# Patient Record
Sex: FEMALE | Race: WHITE | NOT HISPANIC OR LATINO | Employment: FULL TIME | URBAN - METROPOLITAN AREA
[De-identification: names, ages, dates, MRNs, and addresses within clinical notes are randomized per-mention and may not be internally consistent; named-entity substitution may affect disease eponyms.]

---

## 2018-05-04 ENCOUNTER — OFFICE VISIT (OUTPATIENT)
Dept: FAMILY MEDICINE | Facility: CLINIC | Age: 33
End: 2018-05-04
Payer: COMMERCIAL

## 2018-05-04 VITALS
HEIGHT: 64 IN | BODY MASS INDEX: 36.02 KG/M2 | TEMPERATURE: 98.3 F | OXYGEN SATURATION: 96 % | DIASTOLIC BLOOD PRESSURE: 84 MMHG | SYSTOLIC BLOOD PRESSURE: 126 MMHG | WEIGHT: 211 LBS | HEART RATE: 61 BPM

## 2018-05-04 DIAGNOSIS — R22.41 MASS OF RIGHT THIGH: Primary | ICD-10-CM

## 2018-05-04 PROBLEM — K50.80 CROHN'S DISEASE OF BOTH SMALL AND LARGE INTESTINE WITHOUT COMPLICATION (H): Status: ACTIVE | Noted: 2018-05-04

## 2018-05-04 PROCEDURE — 99213 OFFICE O/P EST LOW 20 MIN: CPT | Performed by: NURSE PRACTITIONER

## 2018-05-04 ASSESSMENT — PAIN SCALES - GENERAL: PAINLEVEL: NO PAIN (0)

## 2018-05-04 NOTE — PROGRESS NOTES
SUBJECTIVE:   Marylou Mortensen is a 32 year old female who presents to clinic today for the following health issues:      Concern - Thigh swelling  Onset: noticed 1 week ago    Description:   Swelling or size increased in Right thigh     Intensity: mild    Progression of Symptoms:  same    Accompanying Signs & Symptoms:  Numbness in thigh an right leg occasionally    Previous history of similar problem:   No    Precipitating factors:   Worsened by: None    Alleviating factors:  Improved by: None    Therapies Tried and outcome: None    32 year old female presents with the above complaints. She was trying on pants about 1 week ago and noticed that her right thigh is larger than her left and she has a firm bulge/mass to the medial aspect of the right thigh. No pain. Flexing her thigh doesn't change the mass. Leg feels a little numb when she sits flat on her buttocks. Mom has a hx of benign tumors all over body (mouth, uterus)    Problem list and histories reviewed & adjusted, as indicated.  Additional history: as documented    Patient Active Problem List   Diagnosis     Crohn's disease of both small and large intestine without complication (H)     Past Surgical History:   Procedure Laterality Date     NO HISTORY OF SURGERY         Social History   Substance Use Topics     Smoking status: Never Smoker     Smokeless tobacco: Never Used     Alcohol use Yes      Comment: socially     Family History   Problem Relation Age of Onset     DIABETES Father      C.A.D. Maternal Grandmother      C.A.D. Paternal Grandmother      Breast Cancer Paternal Grandmother          Current Outpatient Prescriptions   Medication Sig Dispense Refill     AZATHIOPRINE PO        InFLIXimab (REMICADE IV)        methylPREDNISolone sodium succinate        VITAMIN D, CHOLECALCIFEROL, PO Take 2,000 Units by mouth daily       ATROPINE SULFATE 1 % OP SOLN One gtt qid 1 bottle 0     CYCLOPENTOLATE HCL 1 % OP SOLN   0     PREDNISOL 1 % OP SOLN as  "directed 1 0     No Known Allergies    Reviewed and updated as needed this visit by clinical staff  Tobacco  Allergies  Meds  Problems  Med Hx  Surg Hx  Fam Hx  Soc Hx        Reviewed and updated as needed this visit by Provider  Allergies  Meds  Problems         ROS:  Constitutional, HEENT, cardiovascular, pulmonary, gi and gu systems are negative, except as otherwise noted.    OBJECTIVE:     /84 (BP Location: Right arm, Patient Position: Chair, Cuff Size: Adult Large)  Pulse 61  Temp 98.3  F (36.8  C) (Oral)  Ht 5' 3.75\" (1.619 m)  Wt 211 lb (95.7 kg)  SpO2 96%  Breastfeeding? No  BMI 36.5 kg/m2  Body mass index is 36.5 kg/(m^2).  GENERAL: healthy, alert and no distress  RESP: lungs clear to auscultation - no rales, rhonchi or wheezes  CV: regular rate and rhythm, normal S1 S2, no S3 or S4, no murmur, click or rub, no peripheral edema and peripheral pulses strong  MS: obvious asymmetry of thighs. Left thigh measures 24.5 in and right thigh 29 in. No tenderness  SKIN: no suspicious lesions or rashes. No erythema or ecchymosis  PSYCH: mentation appears normal, affect normal/bright    Diagnostic Test Results:  none     ASSESSMENT/PLAN:     1. Mass of right thigh  Will get US. Further plan pending results of imaging  - US Extremity Non Vascular Right; Future    See Patient Instructions    Patient verbalizes understanding and agrees with plan of care. Patient stable for discharge.      HARESH Torres [facility]  "

## 2018-05-04 NOTE — PATIENT INSTRUCTIONS
Please schedule ultrasound. Further plan pending results    At [facility], we strive to deliver an exceptional experience to you, every time we see you.  If you receive a survey in the mail, please send us back your thoughts. We really do value your feedback.    Based on your medical history, these are the current health maintenance/preventive care services that you are due for (some may have been done at this visit.)  Health Maintenance Due   Topic Date Due     TETANUS IMMUNIZATION (SYSTEM ASSIGNED)  07/15/2002     HIV SCREEN (SYSTEM ASSIGNED)  07/15/2002     PAP SCREENING Q3 YR (SYSTEM ASSIGNED)  07/15/2005         Suggested websites for health information:  Www.[facility].org : Up to date and easily searchable information on multiple topics.  Www.medlineplus.gov : medication info, interactive tutorials, watch real surgeries online  Www.familydoctor.org : good info from the Academy of Family Physicians  Www.cdc.gov : public health info, travel advisories, epidemics (H1N1)  Www.aap.org : children's health info, normal development, vaccinations  Www.health.[facility].[place].us : [place] dept of health, public health issues in [place], N1N1    Your care team:                            Family Medicine Internal Medicine   MD Riccardo Jackson MD Shantel Branch-Fleming, MD Katya Georgiev PA-C Nam Ho, MD Pediatrics   MARIVEL Garnett, MD Cheryl Lovell CNP, MD Deborah Mielke, MD Kim Thein, APRN [facility]      Clinic hours: Monday - Thursday 7 am-7 pm; Fridays 7 am-5 pm.   Urgent care: Monday - Friday 11 am-9 pm; Saturday and Sunday 9 am-5 pm.  Pharmacy : Monday -Thursday 8 am-8 pm; Friday 8 am-6 pm; Saturday and Sunday 9 am-5 pm.     Clinic: [phone]   Pharmacy: [phone]

## 2018-05-04 NOTE — MR AVS SNAPSHOT
After Visit Summary   5/4/2018    Marylou Mortensen    MRN: [id number]           Patient Information     Date Of Birth          1985        Visit Information        Provider Department      5/4/2018 11:40 AM Chen Valiente APRN CNP [facility]        Today's Diagnoses     Mass of right thigh    -  1      Care Instructions    Please schedule ultrasound. Further plan pending results    At [facility], we strive to deliver an exceptional experience to you, every time we see you.  If you receive a survey in the mail, please send us back your thoughts. We really do value your feedback.    Based on your medical history, these are the current health maintenance/preventive care services that you are due for (some may have been done at this visit.)  Health Maintenance Due   Topic Date Due     TETANUS IMMUNIZATION (SYSTEM ASSIGNED)  07/15/2002     HIV SCREEN (SYSTEM ASSIGNED)  07/15/2002     PAP SCREENING Q3 YR (SYSTEM ASSIGNED)  07/15/2005         Suggested websites for health information:  Www.[employer] : Up to date and easily searchable information on multiple topics.  Www.medlineplus.gov : medication info, interactive tutorials, watch real surgeries online  Www.familydoctor.org : good info from the Academy of Family Physicians  Www.cdc.gov : public health info, travel advisories, epidemics (H1N1)  Www.aap.org : children's health info, normal development, vaccinations  Www.health.state.[place]us : [place] dept of health, public health issues in [place], N1N1    Your care team:                            Family Medicine Internal Medicine   MD Riccardo Jackson MD Shantel Branch-Fleming, MD Katya Georgiev PA-C Nam Ho, MD Pediatrics   MARIVEL Garnett CNP Amelia Massimini APRN CNP Shaista Malik, MD Bethany Templen, MD Deborah Mielke, MD Kim Thein, APRN CNP      Clinic hours: Monday - Thursday 7 am-7 pm; Fridays 7 am-5 pm.  "  Urgent care: Monday - Friday 11 am-9 pm; Saturday and  9 am-5 pm.  Pharmacy : Monday -Thursday 8 am-8 pm; Friday 8 am-6 pm; Saturday and  9 am-5 pm.     Clinic: [phone]   Pharmacy: [phone]            Follow-ups after your visit        Future tests that were ordered for you today     Open Future Orders        Priority Expected Expires Ordered    US Extremity Non Vascular Right Routine  2019            Who to contact     If you have questions or need follow up information about today's clinic visit or your schedule please contact [facility] directly at [phone].  Normal or non-critical lab and imaging results will be communicated to you by MyChart, letter or phone within 4 business days after the clinic has received the results. If you do not hear from us within 7 days, please contact the clinic through MyChart or phone. If you have a critical or abnormal lab result, we will notify you by phone as soon as possible.  Submit refill requests through [employer] or call your pharmacy and they will forward the refill request to us. Please allow 3 business days for your refill to be completed.          Additional Information About Your Visit        [employer] Information     [employer] lets you send messages to your doctor, view your test results, renew your prescriptions, schedule appointments and more. To sign up, go to [email or web address] . Click on \"Log in\" on the left side of the screen, which will take you to the Welcome page. Then click on \"Sign up Now\" on the right side of the page.     You will be asked to enter the access code listed below, as well as some personal information. Please follow the directions to create your username and password.     Your access code is: [email or web address]  Expires: 2018 12:04 PM     Your access code will  in 90 days. If you need help or a new code, please call your [place] clinic or [phone].        Care EveryWhere " "ID     This is your Care EveryWhere ID. This could be used by other organizations to access your [place] medical records  [id number]        Your Vitals Were     Pulse Temperature Height Pulse Oximetry Breastfeeding? BMI (Body Mass Index)    61 98.3  F (36.8  C) (Oral) 5' 3.75\" (1.619 m) 96% No 36.5 kg/m2       Blood Pressure from Last 3 Encounters:   05/04/18 126/84   08/14/07 110/68   08/02/06 106/68    Weight from Last 3 Encounters:   05/04/18 211 lb (95.7 kg)   08/14/07 151 lb 12.8 oz (68.9 kg)   08/02/06 166 lb (75.3 kg)               Primary Care Provider Office Phone # Fax #    Avery Issa Snell [phone]       [address] [place]            [place] [address]        Equal Access to Services     [facility]: Hadii aad ku hadasho Soomaali, waaxda luqadaha, qaybta kaalmada adeegyada, waxay alessioin haycathien katherine joya . So [facility] [phone].    ATENCIÓN: Si habla español, tiene a fernandez disposición servicios gratuitos de asistencia lingüística. [place] al [phone].    We comply with applicable federal civil rights laws and [place] laws. We do not discriminate on the basis of race, color, national origin, age, disability, sex, sexual orientation, or gender identity.            Thank you!     Thank you for choosing [facility]  for your care. Our goal is always to provide you with excellent care. Hearing back from our patients is one way we can continue to improve our services. Please take a few minutes to complete the written survey that you may receive in the mail after your visit with us. Thank you!             Your Updated Medication List - Protect others around you: Learn how to safely use, store and throw away your medicines at www.disposemymeds.org.          This list is accurate as of 5/4/18 12:04 PM.  Always use your most recent med list.                   Brand Name Dispense Instructions for use Diagnosis    atropine 1 % ophthalmic solution     1 bottle "    One gtt qid    Other eye problems       AZATHIOPRINE PO           cyclopentolate 1 % ophthalmic solution    CYCLOGYL      Other eye problems       methylPREDNISolone sodium succinate           PREDNISOL 1 % ophthalmic solution   Generic drug:  prednisoLONE sodium phosphate     1    as directed    Other eye problems       REMICADE IV           VITAMIN D (CHOLECALCIFEROL) PO      Take 2,000 Units by mouth daily

## 2018-05-07 ENCOUNTER — RADIANT APPOINTMENT (OUTPATIENT)
Dept: ULTRASOUND IMAGING | Facility: CLINIC | Age: 33
End: 2018-05-07
Attending: NURSE PRACTITIONER
Payer: COMMERCIAL

## 2018-05-07 DIAGNOSIS — R22.41 MASS OF RIGHT THIGH: ICD-10-CM

## 2018-05-07 LAB — RADIOLOGIST FLAGS: NORMAL

## 2018-05-08 ENCOUNTER — TELEPHONE (OUTPATIENT)
Dept: FAMILY MEDICINE | Facility: CLINIC | Age: 33
End: 2018-05-08

## 2018-05-08 DIAGNOSIS — R22.41 MASS OF RIGHT THIGH: Primary | ICD-10-CM

## 2018-05-08 NOTE — TELEPHONE ENCOUNTER
I called patient to discuss ultrasound results. I would like to speak with her regarding her results when she calls back. If I'm not available please ask her for the best times to call her today and I'll call her back during one of those times. Thank you.

## 2018-05-09 NOTE — TELEPHONE ENCOUNTER
I have tried to reach patient x3 on the phone with no call back. I am out of clinic tomorrow. If she calls back tomorrow, please let her know the following:    Her ultrasound is concerning for a cancerous lesion. We need to do a MRI to further evaluate. I have pended the order. Once she has been notified please let me know and I'll sign it. Thanks

## 2018-05-10 ENCOUNTER — TELEPHONE (OUTPATIENT)
Dept: FAMILY MEDICINE | Facility: CLINIC | Age: 33
End: 2018-05-10

## 2018-05-10 NOTE — TELEPHONE ENCOUNTER
Lydia from  imaging scheduling needs you to release the order you placed. It is still pending and they cannot schedule until order is released.

## 2018-05-10 NOTE — TELEPHONE ENCOUNTER
Spoke with patient and relayed results/message to patient from provider. Gave patient the [phone] for [facility] to schedule MRI as this RN did not see a referral or order anywhere else in chart with phone number. Told patient to call clinic back at main [phone] if she is not able to schedule with that phone number.    Anne Stover RN

## 2018-05-10 NOTE — TELEPHONE ENCOUNTER
Reason for Call:  Other Results    Detailed comments: Pt returning phone call and would like a phone call back to discuss results.    Phone Number Patient can be reached at: Home number on file [phone] (home)    Best Time: anytime    Can we leave a detailed message on this number? YES    Call taken on 5/10/2018 at 8:04 AM by rDe Haas

## 2018-05-21 ENCOUNTER — RADIANT APPOINTMENT (OUTPATIENT)
Dept: MRI IMAGING | Facility: CLINIC | Age: 33
End: 2018-05-21
Attending: NURSE PRACTITIONER
Payer: COMMERCIAL

## 2018-05-21 DIAGNOSIS — R22.41 MASS OF RIGHT THIGH: ICD-10-CM

## 2018-05-21 LAB — RADIOLOGIST FLAGS: NORMAL

## 2018-05-21 PROCEDURE — A9585 GADOBUTROL INJECTION: HCPCS | Performed by: NURSE PRACTITIONER

## 2018-05-21 PROCEDURE — 73720 MRI LWR EXTREMITY W/O&W/DYE: CPT | Mod: RT | Performed by: RADIOLOGY

## 2018-05-21 RX ORDER — GADOBUTROL 604.72 MG/ML
10 INJECTION INTRAVENOUS ONCE
Status: COMPLETED | OUTPATIENT
Start: 2018-05-21 | End: 2018-05-21

## 2018-05-21 RX ADMIN — GADOBUTROL 10 ML: 604.72 INJECTION INTRAVENOUS at 15:26

## 2018-05-22 ENCOUNTER — RADIANT APPOINTMENT (OUTPATIENT)
Dept: GENERAL RADIOLOGY | Facility: CLINIC | Age: 33
End: 2018-05-22
Attending: ORTHOPAEDIC SURGERY
Payer: COMMERCIAL

## 2018-05-22 ENCOUNTER — OFFICE VISIT (OUTPATIENT)
Dept: ORTHOPEDICS | Facility: CLINIC | Age: 33
End: 2018-05-22
Payer: COMMERCIAL

## 2018-05-22 ENCOUNTER — TELEPHONE (OUTPATIENT)
Dept: FAMILY MEDICINE | Facility: CLINIC | Age: 33
End: 2018-05-22

## 2018-05-22 VITALS — HEIGHT: 64 IN | WEIGHT: 211 LBS | BODY MASS INDEX: 36.02 KG/M2

## 2018-05-22 DIAGNOSIS — R22.41 MASS OF RIGHT THIGH: Primary | ICD-10-CM

## 2018-05-22 DIAGNOSIS — M79.89 SOFT TISSUE MASS: ICD-10-CM

## 2018-05-22 DIAGNOSIS — M79.89 SOFT TISSUE MASS: Primary | ICD-10-CM

## 2018-05-22 ASSESSMENT — ENCOUNTER SYMPTOMS
POLYPHAGIA: 1
MYALGIAS: 1
HOT FLASHES: 0
CHILLS: 0
BACK PAIN: 1
POLYDIPSIA: 1
FEVER: 0
HALLUCINATIONS: 0
ARTHRALGIAS: 1
JAUNDICE: 0
BLOATING: 1
VOMITING: 1
HEARTBURN: 1
WEIGHT GAIN: 1
NIGHT SWEATS: 0
FLANK PAIN: 0
FATIGUE: 1
NECK PAIN: 1
DECREASED APPETITE: 0
STIFFNESS: 1
RECTAL PAIN: 0
DIFFICULTY URINATING: 0
BOWEL INCONTINENCE: 0
CONSTIPATION: 0
MUSCLE CRAMPS: 0
DECREASED LIBIDO: 0
MUSCLE WEAKNESS: 0
NAUSEA: 1
DYSURIA: 0
JOINT SWELLING: 0
DIARRHEA: 1
BLOOD IN STOOL: 0
ABDOMINAL PAIN: 0
ALTERED TEMPERATURE REGULATION: 1
HEMATURIA: 0
INCREASED ENERGY: 0
WEIGHT LOSS: 0

## 2018-05-22 NOTE — PROGRESS NOTES
[facility], Orthopaedic Surgery, Arthritis, Hip and Knee Replacement    Marylou Mortensen MRN# [id number]   Age: 32 year old YOB: 1985     Requesting physician: Chen Valiente              History of Present Illness:   Marylou Mortensen is a 32 year old year old female who presents today for evaluation and management of right thigh mass.  She states that on approximately May 7, 2018 she was trying on close when she noticed that her right thigh was significantly larger in size compared to her left thigh. She had not had any prior injury or illness. She noticed that her pants were fitting differently and then she looked in the medial side of her right thigh was significantly larger. She was able to palpate a mass in her right medial and posterior thigh She says that this mass is nonpainful. She sought care by the end of the week when the immediately obtain an ultrasound and subsequently an MRI. 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    Background history:  DX:  1. Crohn's disease  2. Elevated liver function tests  3. Prediabetes           Past Medical History:     Patient Active Problem List   Diagnosis     Crohn's disease of both small and large intestine without complication (H)     Mass of right thigh   -elevated liver function tests.  -Pre-diabetes    Prior history of blood clot: none  Prior history of bleeding problems: none  Prior history of anesthetic complications: none  Currently on opioids:  none  History of Diabetes: no           Past Surgical History:   None         Social History:     Smoking: Nonsmoker, drinks alcohol rarely.  Living situation: Lives in a no call. She works full-time as a  for hospital           Family History:       Family History   Problem Relation Age of Onset     DIABETES Father      C.A.D. Maternal  "Grandmother      HERMILO. Paternal Grandmother      Breast Cancer Paternal Grandmother        Family history of blood clot: none  Family history of bleeding problems: none  Family history of anesthetic complications: none         Medications:   As of 5 pre-  Influx and map  Vitamin D         Review of Systems:   A comprehensive 10 point review of systems (constitutional, ENT, cardiac, peripheral vascular, lymphatic, respiratory, GI, , Musculoskeletal, skin, Neurological) was performed and found to be negative except as described in this note.     Also see intake form completed by patient.             Physical Exam:     EXAMINATION pertinent findings:   VITAL SIGNS: Height 1.619 m (5' 3.75\"), weight 95.7 kg (211 lb), not currently breastfeeding.  Body mass index is 36.5 kg/(m^2).  GEN: AOx3, cooperative, no distress  RESP: non labored breathing   ABD: benign   SKIN: grossly normal   LYMPHATIC: grossly normal   NEURO: grossly normal   VASCULAR: satisfactory perfusion of all extremities  MUSCULOSKELETAL:     Right Leg: There is a very large and firm mass on the posterior medial aspect of the right thigh. This is mostly located in the abductor and hamstring compartments. There are no overlying skin changes, erythema, warmth. She has a palpable dorsalis pedis pulse and sensation intact to light touch in the sural saphenous tibial superficial and deep peroneal nerve distributions. 5 out of 5 strength with straight leg raise tibialis anterior gastrocsoleus complex and extensor hallucis longus. No pain with passive hip range of motion             Data:   Imaging:   There is a very large and heterogeneous mass in the posterior medial, proximal thigh. This is mostly localized to the abductor compartment, mainly the abductor Brewster however may also involve the semimembranosus. It does appear to have multiple septations.           Assessment and Plan:   Assessment: 32-year-old female with past medical history of Crohn's " disease with a right posterior medial thigh soft tissue mass. Differential diagnosis includes fibromatosis, soft tissue sarcoma.       Plan:  -A Crow-Cut needle biopsy was obtained in clinic today under sterile technique.  -Will call the patient to discuss the results and determine what treatment to proceed with thereafter          Pinky Santos PGY-4  Orthopedic Surgery           Review of Systems:         Answers for HPI/ROS submitted by the patient on 5/22/2018   General Symptoms: Yes  Skin Symptoms: No  HENT Symptoms: No  EYE SYMPTOMS: No  HEART SYMPTOMS: No  LUNG SYMPTOMS: No  INTESTINAL SYMPTOMS: Yes  URINARY SYMPTOMS: Yes  GYNECOLOGIC SYMPTOMS: Yes  BREAST SYMPTOMS: No  SKELETAL SYMPTOMS: Yes  BLOOD SYMPTOMS: No  NERVOUS SYSTEM SYMPTOMS: No  MENTAL HEALTH SYMPTOMS: No  Fever: No  Loss of appetite: No  Weight loss: No  Weight gain: Yes  Fatigue: Yes  Night sweats: No  Chills: No  Increased stress: No  Excessive hunger: Yes  Excessive thirst: Yes  Feeling hot or cold when others believe the temperature is normal: Yes  Loss of height: No  Post-operative complications: No  Surgical site pain: No  Hallucinations: No  Change in or Loss of Energy: No  Hyperactivity: No  Confusion: No  Heart burn or indigestion: Yes  Nausea: Yes  Vomiting: Yes  Abdominal pain: No  Bloating: Yes  Constipation: No  Diarrhea: Yes  Blood in stool: No  Black stools: No  Rectal or Anal pain: No  Fecal incontinence: No  Yellowing of skin or eyes: No  Vomit with blood: No  Change in stools: No  Trouble holding urine or incontinence: Yes  Pain or burning: No  Trouble starting or stopping: No  Increased frequency of urination: Yes  Blood in urine: No  Decreased frequency of urination: No  Frequent nighttime urination: Yes  Flank pain: No  Difficulty emptying bladder: No  Back pain: Yes  Muscle aches: Yes  Neck pain: Yes  Swollen joints: No  Joint pain: Yes  Bone pain: Yes  Muscle cramps: No  Muscle weakness: No  Joint stiffness: Yes  Bone  fracture: No  Bleeding or spotting between periods: Yes  Heavy or painful periods: Yes  Irregular periods: Yes  Vaginal discharge: No  Hot flashes: No  Vaginal dryness: No  Genital ulcers: No  Reduced libido: No  Painful intercourse: No  Difficulty with sexual arousal: No  Post-menopausal bleeding: No

## 2018-05-22 NOTE — MR AVS SNAPSHOT
After Visit Summary   5/22/2018    Marylou Mortensen    MRN: [id number]           Patient Information     Date Of Birth          1985        Visit Information        Provider Department      5/22/2018 5:45 PM Dylon Mendez MD [facility]        Today's Diagnoses     Soft tissue mass    -  1       Follow-ups after your visit        Your next 10 appointments already scheduled     Jul 26, 2018   Procedure with Dylon Mendez MD   [facility] ([facility])    [address] [place] [address]  5th [facility] [address] [phone]           Located in the Clinics and Surgery Center at [address] [place] [address].   parking is very convenient and highly recommended.  is a $6 flat rate fee.  Both  and self parkers should enter the main arrival plaza from [facility]; parking attendants will direct you based on your parking preference.              Who to contact     Please call your clinic at [phone] to:    Ask questions about your health    Make or cancel appointments    Discuss your medicines    Learn about your test results    Speak to your doctor            Additional Information About Your Visit        [facility] Information     [employer] gives you secure access to your electronic health record. If you see a primary care provider, you can also send messages to your care team and make appointments. If you have questions, please call your primary care clinic.  If you do not have a primary care provider, please call [phone] and they will assist you.      [employer] is an electronic gateway that provides easy, online access to your medical records. With [employer], you can request a clinic appointment, read your test results, renew a prescription or communicate with your care team.     To access your existing account, please contact your [facility] or call  "[phone] for assistance.        Care EveryWhere ID     This is your Care EveryWhere ID. This could be used by other organizations to access your [place] medical records  [id number]        Your Vitals Were     Height Last Period BMI (Body Mass Index)             1.619 m (5' 3.75\") 05/22/2018 36.5 kg/m2          Blood Pressure from Last 3 Encounters:   05/31/18 121/77   05/29/18 143/85   05/04/18 126/84    Weight from Last 3 Encounters:   05/29/18 95 kg (209 lb 6.4 oz)   05/22/18 95.7 kg (211 lb)   05/04/18 95.7 kg (211 lb)              We Performed the Following     Wilma-Operative Worksheet (Jessika/Andrea)     Surgical pathology exam          Today's Medication Changes          These changes are accurate as of 5/22/18 11:59 PM.  If you have any questions, ask your nurse or doctor.               Stop taking these medicines if you haven't already. Please contact your care team if you have questions.     atropine 1 % ophthalmic solution   Stopped by:  Dylon Mendez MD           cyclopentolate 1 % ophthalmic solution   Commonly known as:  CYCLOGYL   Stopped by:  Dylon Mendez MD           methylPREDNISolone sodium succinate   Stopped by:  Dylon Mendez MD           PREDNISOL 1 % ophthalmic solution   Generic drug:  prednisoLONE sodium phosphate   Stopped by:  Dylon Mendez MD                    Primary Care Provider Office Phone # Fax # Yoamfug Issa Snell [phone]       [address] [place]            [place] [address]        Equal Access to Services     [facility] AH: Hadii aad ku hadasho Soomaali, waaxda luqadaha, qaybta kaalmada adeegyada, waxSouth Mississippi State Hospitalmargy mayo. So [facility] [phone].    ATENCIÓN: Si habla español, tiene a fernandez disposición servicios gratuitos de asistencia lingüística. Llame al [phone].    We comply with applicable federal civil rights laws and [place] laws. We do not discriminate on the basis of race, color, " national origin, age, disability, sex, sexual orientation, or gender identity.            Thank you!     Thank you for choosing [facility]  for your care. Our goal is always to provide you with excellent care. Hearing back from our patients is one way we can continue to improve our services. Please take a few minutes to complete the written survey that you may receive in the mail after your visit with us. Thank you!             Your Updated Medication List - Protect others around you: Learn how to safely use, store and throw away your medicines at www.disposemymeds.org.          This list is accurate as of 5/22/18 11:59 PM.  Always use your most recent med list.                   Brand Name Dispense Instructions for use Diagnosis    AZATHIOPRINE PO      Take by mouth daily        REMICADE IV      Every 6-8 weeks        VITAMIN D (CHOLECALCIFEROL) PO      Take 2,000 Units by mouth daily

## 2018-05-22 NOTE — LETTER
5/22/2018       RE: Marylou Mortensen  [address]   [facility] [address]     Dear Colleague,    Thank you for referring your patient, Marylou Mortensen, to the [facility] at [facility]. Please see a copy of my visit note below.    [facility], Orthopaedic Surgery, Arthritis, Hip and Knee Replacement    Marylou Mortensen MRN# [id number]   Age: 32 year old YOB: 1985     Requesting physician: Chen Valiente              History of Present Illness:   Marylou Mortensen is a 32 year old year old female who presents today for evaluation and management of right thigh mass.  She states that on approximately May 7, 2018 she was trying on close when she noticed that her right thigh was significantly larger in size compared to her left thigh. She had not had any prior injury or illness. She noticed that her pants were fitting differently and then she looked in the medial side of her right thigh was significantly larger. She was able to palpate a mass in her right medial and posterior thigh She says that this mass is nonpainful. She sought care by the end of the week when the immediately obtain an ultrasound and subsequently an MRI. 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    Background history:  DX:  1. Crohn's disease  2. Elevated liver function tests  3. Prediabetes           Past Medical History:     Patient Active Problem List   Diagnosis     Crohn's disease of both small and large intestine without complication (H)     Mass of right thigh   -elevated liver function tests.  -Pre-diabetes    Prior history of blood clot: none  Prior history of bleeding problems: none  Prior history of anesthetic complications: none  Currently on opioids:  none  History of Diabetes: no           Past Surgical History:  "  None         Social History:     Smoking: Nonsmoker, drinks alcohol rarely.  Living situation: Lives in a no call. She works full-time as a  for hospital           Family History:       Family History   Problem Relation Age of Onset     DIABETES Father      C.A.D. Maternal Grandmother      C.A.D. Paternal Grandmother      Breast Cancer Paternal Grandmother        Family history of blood clot: none  Family history of bleeding problems: none  Family history of anesthetic complications: none         Medications:   As of 5 pre-  Influx and map  Vitamin D         Review of Systems:   A comprehensive 10 point review of systems (constitutional, ENT, cardiac, peripheral vascular, lymphatic, respiratory, GI, , Musculoskeletal, skin, Neurological) was performed and found to be negative except as described in this note.     Also see intake form completed by patient.             Physical Exam:     EXAMINATION pertinent findings:   VITAL SIGNS: Height 1.619 m (5' 3.75\"), weight 95.7 kg (211 lb), not currently breastfeeding.  Body mass index is 36.5 kg/(m^2).  GEN: AOx3, cooperative, no distress  RESP: non labored breathing   ABD: benign   SKIN: grossly normal   LYMPHATIC: grossly normal   NEURO: grossly normal   VASCULAR: satisfactory perfusion of all extremities  MUSCULOSKELETAL:     Right Leg: There is a very large and firm mass on the posterior medial aspect of the right thigh. This is mostly located in the abductor and hamstring compartments. There are no overlying skin changes, erythema, warmth. She has a palpable dorsalis pedis pulse and sensation intact to light touch in the sural saphenous tibial superficial and deep peroneal nerve distributions. 5 out of 5 strength with straight leg raise tibialis anterior gastrocsoleus complex and extensor hallucis longus. No pain with passive hip range of motion             Data:   Imaging:   There is a very large and heterogeneous mass in the posterior medial, " proximal thigh. This is mostly localized to the abductor compartment, mainly the abductor Christophe however may also involve the semimembranosus. It does appear to have multiple septations.           Assessment and Plan:   Assessment: 32-year-old female with past medical history of Crohn's disease with a right posterior medial thigh soft tissue mass. Differential diagnosis includes fibromatosis, soft tissue sarcoma.       Plan:  -A Crow-Cut needle biopsy was obtained in clinic today under sterile technique.  -Will call the patient to discuss the results and determine what treatment to proceed with thereafter          Pinky Santos PGY-4  Orthopedic Surgery           Review of Systems:         Answers for HPI/ROS submitted by the patient on 5/22/2018   General Symptoms: Yes  Skin Symptoms: No  HENT Symptoms: No  EYE SYMPTOMS: No  HEART SYMPTOMS: No  LUNG SYMPTOMS: No  INTESTINAL SYMPTOMS: Yes  URINARY SYMPTOMS: Yes  GYNECOLOGIC SYMPTOMS: Yes  BREAST SYMPTOMS: No  SKELETAL SYMPTOMS: Yes  BLOOD SYMPTOMS: No  NERVOUS SYSTEM SYMPTOMS: No  MENTAL HEALTH SYMPTOMS: No  Fever: No  Loss of appetite: No  Weight loss: No  Weight gain: Yes  Fatigue: Yes  Night sweats: No  Chills: No  Increased stress: No  Excessive hunger: Yes  Excessive thirst: Yes  Feeling hot or cold when others believe the temperature is normal: Yes  Loss of height: No  Post-operative complications: No  Surgical site pain: No  Hallucinations: No  Change in or Loss of Energy: No  Hyperactivity: No  Confusion: No  Heart burn or indigestion: Yes  Nausea: Yes  Vomiting: Yes  Abdominal pain: No  Bloating: Yes  Constipation: No  Diarrhea: Yes  Blood in stool: No  Black stools: No  Rectal or Anal pain: No  Fecal incontinence: No  Yellowing of skin or eyes: No  Vomit with blood: No  Change in stools: No  Trouble holding urine or incontinence: Yes  Pain or burning: No  Trouble starting or stopping: No  Increased frequency of urination: Yes  Blood in urine:  No  Decreased frequency of urination: No  Frequent nighttime urination: Yes  Flank pain: No  Difficulty emptying bladder: No  Back pain: Yes  Muscle aches: Yes  Neck pain: Yes  Swollen joints: No  Joint pain: Yes  Bone pain: Yes  Muscle cramps: No  Muscle weakness: No  Joint stiffness: Yes  Bone fracture: No  Bleeding or spotting between periods: Yes  Heavy or painful periods: Yes  Irregular periods: Yes  Vaginal discharge: No  Hot flashes: No  Vaginal dryness: No  Genital ulcers: No  Reduced libido: No  Painful intercourse: No  Difficulty with sexual arousal: No  Post-menopausal bleeding: No      Dear Dr. Valiente,    Thank you for asking me to see Marylou Mortensen for evaluation of a large medial right thigh mass.  I did attempt a Crow-Cut biopsy in clinic today this was unsuccessful because the mass is very hard.  It is curious as he did not show up on x-ray to be mineralized.  All things considered I am hoping the lesion represents a fibromatosis rather than a high-grade sarcoma.    I saw her today with Dr. Theodore.  I agree with her assessment and plan.  I will keep you informed on her findings from the open biopsy which is in the process of being scheduled.    She seen today in consultation at your request for 20 minutes with greater than 10 minutes spent answering questions coordinating her care.      Dylon Mendez MD

## 2018-05-22 NOTE — NURSING NOTE
[facility]  [address] [place] [id number]  Dept: [phone]  ______________________________________________________________________________    Patient: Marylou Mortensen   : 1985   MRN: [id number]   May 22, 2018    INVASIVE PROCEDURE SAFETY CHECKLIST    Date: 2018   Procedure:Crow-cut needle biopsy right thigh mass  Patient Name: Marylou Mortensen  MRN: [id number]  YOB: 1985    Action: Complete sections as appropriate. Any discrepancy results in a HARD COPY until resolved.     PRE PROCEDURE:  Patient ID verified with 2 identifiers (name and  or MRN): Yes  Procedure and site verified with patient/designee (when able): Yes  Accurate consent documentation in medical record: Yes  H&P (or appropriate assessment) documented in medical record: NA  H&P must be up to 20 days prior to procedure and updates within 24 hours of procedure as applicable: NA  Relevant diagnostic and radiology test results appropriately labeled and displayed as applicable: Yes  Procedure site(s) marked with provider initials: No, explain: Provider in continuous attendance    TIMEOUT:  Time-Out performed immediately prior to starting procedure, including verbal and active participation of all team members addressing the following:Yes  * Correct patient identify  * Confirmed that the correct side and site are marked  * An accurate procedure consent form  * Agreement on the procedure to be done  * Correct patient position  * Relevant images and results are properly labeled and appropriately displayed  * The need to administer antibiotics or fluids for irrigation purposes during the procedure as applicable   * Safety precautions based on patient history or medication use    DURING PROCEDURE: Verification of correct person, site, and procedures any time the responsibility for care of the patient is transferred to another member of the care team.

## 2018-05-22 NOTE — NURSING NOTE
"Chief Complaint   Patient presents with     Consult     Pt. states that she is here today for Right Thigh Mass. She states that she first noticed the mass about a month ago while trying on clothes. Referring:  XIOMY RODRÍGUEZ       32 year old  1985    Ht 1.619 m (5' 3.75\")  Wt 95.7 kg (211 lb)  BMI 36.5 kg/m2      Pain Assessment  Patient Currently in Pain: Yes (Pt. states that she can feel discomfort due to her Leg being bigger than the other Leg. She states that it's noticable and has discomfort, not necc. pain.)  0-10 Pain Scale: 1  Primary Pain Location: Leg  Pain Orientation: Right, Upper  Pain Descriptors: Discomfort  Aggravating Factors: Sitting             [id number] - [place], [place] - [address] [facility]    No Known Allergies  Current Outpatient Prescriptions   Medication     AZATHIOPRINE PO     InFLIXimab (REMICADE IV)     VITAMIN D, CHOLECALCIFEROL, PO     No current facility-administered medications for this visit.                          "

## 2018-05-22 NOTE — TELEPHONE ENCOUNTER
I called patient to share the results of her MRI from yesterday afternoon with her. 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 We will refer her to orthopedic oncology at the [facility]. I placed order for referral. Patient had no questions.

## 2018-05-23 NOTE — PROGRESS NOTES
Dear Dr. Valiente,    Thank you for asking me to see Marylou Mortensen for evaluation of a large medial right thigh mass.  I did attempt a Crow-Cut biopsy in clinic today this was unsuccessful because the mass is very hard.  It is curious as he did not show up on x-ray to be mineralized.  All things considered I am hoping the lesion represents a fibromatosis rather than a high-grade sarcoma.    I saw her today with Dr. Theodore.  I agree with her assessment and plan.  I will keep you informed on her findings from the open biopsy which is in the process of being scheduled.    She seen today in consultation at your request for 20 minutes with greater than 10 minutes spent answering questions coordinating her care.    Sincerely,

## 2018-05-24 ENCOUNTER — ANESTHESIA EVENT (OUTPATIENT)
Dept: SURGERY | Facility: AMBULATORY SURGERY CENTER | Age: 33
End: 2018-05-24

## 2018-05-29 ENCOUNTER — ALLIED HEALTH/NURSE VISIT (OUTPATIENT)
Dept: SURGERY | Facility: CLINIC | Age: 33
End: 2018-05-29
Payer: COMMERCIAL

## 2018-05-29 ENCOUNTER — APPOINTMENT (OUTPATIENT)
Dept: SURGERY | Facility: CLINIC | Age: 33
End: 2018-05-29
Payer: COMMERCIAL

## 2018-05-29 ENCOUNTER — OFFICE VISIT (OUTPATIENT)
Dept: SURGERY | Facility: CLINIC | Age: 33
End: 2018-05-29
Payer: COMMERCIAL

## 2018-05-29 VITALS
HEIGHT: 64 IN | TEMPERATURE: 98 F | OXYGEN SATURATION: 97 % | RESPIRATION RATE: 16 BRPM | WEIGHT: 209.4 LBS | SYSTOLIC BLOOD PRESSURE: 143 MMHG | HEART RATE: 79 BPM | DIASTOLIC BLOOD PRESSURE: 85 MMHG | BODY MASS INDEX: 35.75 KG/M2

## 2018-05-29 DIAGNOSIS — Z01.818 PREOP EXAMINATION: Primary | ICD-10-CM

## 2018-05-29 DIAGNOSIS — Z01.818 PREOP EXAMINATION: ICD-10-CM

## 2018-05-29 LAB
ALBUMIN SERPL-MCNC: 4.1 G/DL (ref 3.4–5)
ALP SERPL-CCNC: 65 U/L (ref 40–150)
ALT SERPL W P-5'-P-CCNC: 97 U/L (ref 0–50)
ANION GAP SERPL CALCULATED.3IONS-SCNC: 8 MMOL/L (ref 3–14)
AST SERPL W P-5'-P-CCNC: 64 U/L (ref 0–45)
BILIRUB SERPL-MCNC: 0.5 MG/DL (ref 0.2–1.3)
BUN SERPL-MCNC: 13 MG/DL (ref 7–30)
CALCIUM SERPL-MCNC: 9.6 MG/DL (ref 8.5–10.1)
CHLORIDE SERPL-SCNC: 107 MMOL/L (ref 94–109)
CO2 SERPL-SCNC: 24 MMOL/L (ref 20–32)
CREAT SERPL-MCNC: 0.69 MG/DL (ref 0.52–1.04)
GFR SERPL CREATININE-BSD FRML MDRD: >90 ML/MIN/1.7M2
GLUCOSE SERPL-MCNC: 79 MG/DL (ref 70–99)
HGB BLD-MCNC: 14.1 G/DL (ref 11.7–15.7)
POTASSIUM SERPL-SCNC: 3.5 MMOL/L (ref 3.4–5.3)
PROT SERPL-MCNC: 8.5 G/DL (ref 6.8–8.8)
SODIUM SERPL-SCNC: 139 MMOL/L (ref 133–144)

## 2018-05-29 ASSESSMENT — LIFESTYLE VARIABLES: TOBACCO_USE: 0

## 2018-05-29 ASSESSMENT — ENCOUNTER SYMPTOMS: ORTHOPNEA: 0

## 2018-05-29 NOTE — PATIENT INSTRUCTIONS
Preparing for Your Surgery      Name:  Marylou Mortensen   MRN:  [id number]   :  1985   Today's Date:  2018     Arriving for surgery:  Surgery date:  2018  Arrival time:  8:30 am  Please come to:     [facility]  [address] [place] [id number]     Parking is available in front of the Clinics and Surgery Center building from 5:30AM to 8:00PM.  -  Proceed to the 5th floor to check into the Ambulatory Surgery Center.              >> There will be patient concierges on the 1st and 5th floor, for assistance or an escort, if you would like.              >> Please call [phone] with any questions.    What can I eat or drink?  -  You may have solid food or milk products until 8 hours prior to your surgery. Nothing after Midnight   -  You may have water, apple juice or 7up/Sprite until 2 hours prior to your surgery. Until 8 am     Which medicines can I take?      Hold ibuprofen for 24 hours before surgery     -  Please take these medications the day of surgery:     NONE        How do I prepare myself?  -  Take two showers: one the night before surgery; and one the morning of surgery.         Use Scrubcare or Hibiclens to wash from neck down.  You may use your own     shampoo and conditioner. No other hair products.   -  Do NOT use lotion, powder, deodorant, or antiperspirant the day of your surgery.  -  Do NOT wear any makeup, fingernail polish or jewelry.  - Do not bring your own medications to the hospital, except for inhalers and eye   drops.  -  Bring your ID and insurance card.    Questions or Concerns:  If you have questions or concerns regarding the day of surgery, please call the Ambulatory Surgery Center call [phone].      After surgery please call your surgeons office.

## 2018-05-29 NOTE — ANESTHESIA PREPROCEDURE EVALUATION
"  Anesthesia Evaluation     . Pt has had prior anesthetic. Type: MAC    History of anesthetic complications   - PONV and motion sickness        ROS/MED HX    ENT/Pulmonary:  - neg pulmonary ROS    (-) tobacco use and recent URI   Neurologic:  - neg neurologic ROS     Cardiovascular:  - neg cardiovascular ROS      (-) taking anticoagulants/antiplatelets, FISHER and orthopnea/PND   METS/Exercise Tolerance:  4 - Raking leaves, gardening   Hematologic:  - neg hematologic  ROS       Musculoskeletal:  - neg musculoskeletal ROS       GI/Hepatic: Comment: GERD symptoms associated with Crohn's disease.  Takes OTC medications, as needed, not every day.      (+) GERD Asymptomatic on medication, Inflammatory bowel disease, Other GI/Hepatic elevated LFTs      Renal/Genitourinary:  - ROS Renal section negative       Endo: Comment: \"Pre-diabetes\"    (+) Obesity, .      Psychiatric:  - neg psychiatric ROS       Infectious Disease:  - neg infectious disease ROS       Malignancy:      - no malignancy   Other:    - neg other ROS                 Physical Exam  Normal systems: cardiovascular, pulmonary and dental    Airway   Mallampati: I  TM distance: >3 FB  Neck ROM: full    Dental     Cardiovascular   Rhythm and rate: regular and normal  (-) no peripheral edema and no murmur    Pulmonary    breath sounds clear to auscultation    Other findings: 5/29/18: Hgb: 14.1  5/29/18: Na: 139; K: 3.5; Cl: 107; Glu: 79; BUN: 13; Cr: 0.69; Ca: 9.6; Bili total: 0.5; Albumin: 4.1; Alk phos: 65; ALT: 97; AST: 64             PAC Discussion and Assessment    ASA Classification: 2  Case is suitable for: ASC  Anesthetic techniques and relevant risks discussed: GA and GA with regional block for post-op pain control  Invasive monitoring and risk discussed: No  Types:   Possibility and Risk of blood transfusion discussed: No  NPO instructions given:   Additional anesthetic preparation and risks discussed:   Needs early admission to pre-op area:   Other: "     PAC Resident/NP Anesthesia Assessment:  Marylou Mortensen is a 32 year old female scheduled to undergo Biopsy Right Thigh Tumor on 5/31/18 with Dr. Dylon Mendez.    She has the following specific operative considerations:   1.  HIGH risk of postoperative nausea/vomiting with motion sickness: Recommend use of antiemetic agents in the perioperative period.    2.  Occasional GERD symptoms: Consider use of RSI techniques with advanced airway maneuvers.  3.  Obesity: Recommend careful positioning to prevent airway/ventilatory compromise, or tissue injury.    4.  Hgb, CMP today.    ADDENDUM:  Elevated ALT of 97 and AST of 64 noted on today's labs.  Recent lab results are not available.  Patient reports a recent history of elevated LFTs, being followed by Dr. Todd with [facility].  Called patient and advised her of the lab results.  Will forward them to Dr. Todd with [facility].  Do not anticipate that this will change the plan of care.    Revised Cardiac Risk Index: 0.4% risk of major adverse cardiac event.  VTE risk: 0.26%  ANDREA risk: Low  PONV risk score= 4.  (If > 2, anti-emetic intervention is recommended.)  Anesthesia considerations: Refer to PAC assessment in the anesthesia records.      Reviewed and Signed by PAC Mid-Level Provider/Resident  Mid-Level Provider/Resident: Kathryn Rojas CNP  Date: 5/29/18  Time:     Attending Anesthesiologist Anesthesia Assessment:  32 year old for biopsy of right thigh tumor. Patient has no significant cardiac or pulmonary disease. As noted, mild elevation of liver enzymes noted today, would not need to delay surgery for this mild elevation.     Patient/case discussed with IRAM. No need to see patient. Patient is appropriate for the planned procedure without further work-up or medical management.      Reviewed and Signed by PAC Anesthesiologist  Anesthesiologist: rachelle  Date: 5/30/2017  Time:   Pass/Fail: Pass  Disposition:     PAC Pharmacist  Assessment:        Pharmacist:   Date:   Time:      Anesthesia Plan      History & Physical Review  History and physical reviewed and following examination; no interval change.    ASA Status:  2 .    NPO Status:  > 8 hours    Plan for General and LMA with Intravenous and Propofol induction. Maintenance will be TIVA.    PONV prophylaxis:  Ondansetron (or other 5HT-3) and Dexamethasone or Solumedrol       Postoperative Care  Postoperative pain management:  Multi-modal analgesia.      Consents  Anesthetic plan, risks, benefits and alternatives discussed with:  Patient.  Use of blood products discussed: No .   .                          .

## 2018-05-29 NOTE — H&P
"    Pre-Operative H & P     Date of Encounter: 5/29/2018  Primary Care Physician:  Avery Snell    CC: Right thigh mass.    HPI:  Marylou Mortensen is a 32 year old female who presents for pre-operative H & P in preparation for Biopsy Right Thigh Tumor on 5/31/18 with Dr. Dylon Mendez at [facility].   The patient presented to her primary clinic on 5/4/18 with swelling in her right thigh.  She first noted the change approximately 1 week prior when she was trying on pants. She was able to feel a firm mass over the medial aspect of her right thigh.  Flexion of the muscle group caused no changes in the mass.  She also reported occasional numbness in the area, but denied pain.  No recent injury or illness. 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 Mendez.  She was evaluated by Dr. Mendez on 5/22,  and needle biopsy was attempted, but could not be completed as the mass is very hard. Arrangements are now being made for the above procedure.  History is obtained from the patient and the medical record.    Past Medical History:  Past Medical History:   Diagnosis Date     Crohn's disease (H)      GERD (gastroesophageal reflux disease)     Occasional symptoms, does not need to take medication regularly.       H/O Elevated liver enzymes     Patient was told it was due to her carb-heavy diet, and \"pre-diabetes\"     H/O tension headache      PONV (postoperative nausea and vomiting)      Prediabetes      Past Surgical History:  Past Surgical History:   Procedure Laterality Date     COLONOSCOPY       ENDOSCOPY       Hx of Blood transfusions/reactions: Denies.    Hx of abnormal bleeding or anti-platelet use: Denies.    Menstrual history: Patient's last menstrual period was 05/22/2018.    Steroid use in the last year: Denies.    Personal or FH of difficulty with anesthesia: PONV.    Prior to admission medications  Current Outpatient Prescriptions   Medication " Sig Dispense Refill     IBUPROFEN PO Take by mouth every 8 hours as needed for moderate pain       AZATHIOPRINE PO Take by mouth daily        InFLIXimab (REMICADE IV) Every 6-8 weeks       VITAMIN D, CHOLECALCIFEROL, PO Take 2,000 Units by mouth daily       Allergies  No Known Allergies    Social History  Social History     Social History     Marital status: Single     Spouse name: N/A     Number of children: N/A     Years of education: N/A     Occupational History     student [employer]     Social History Main Topics     Smoking status: Never Smoker     Smokeless tobacco: Never Used     Alcohol use Yes      Comment: Rarely, maybe one drink once a month.       Drug use: No     Sexual activity: Not Currently     Partners: Male     Other Topics Concern     Not on file     Social History Narrative     Family History  Family History   Problem Relation Age of Onset     DIABETES Father      Coronary Artery Disease Maternal Grandmother 50     Breast Cancer Paternal Grandmother      Tumor Mother      Multiple tumors over the body     HEART DISEASE Mother      Crohn Disease Mother      Celiac Disease Brother      CANCER Maternal Grandfather      No Known Problems Brother      Medical History Unknown Paternal Grandfather      Review of Systems  Functional status: Independent in ADL's.  4 METS.     The complete review of systems is negative other than noted in the HPI or here.   Constitutional: Denies recent changes in weight, or fevers/chills.  Notes that her sleep has been altered the past week due to anxiety.    Eyes: Glasses for vision correction.  No recent vision changes.  EENT: Denies recent changes in hearing, mouth pain, or difficulty swallowing.  Cardiovascular: Denies chest pain, FISHER or orthopnea, or palpitations.  Respiratory: Denies shortness of breath or significant cough.    GI: Denies nausea/vomiting or diarrhea/constipation.    : Denies dysuria.    Musculoskeletal: Denies joint pain or swelling.  Hard mass  "palpated over the medical aspect of the right thigh.     Skin: Denies rashes or wounds.    Hematologic: Denies easy bruising or bleeding.    Neurologic: Denies migraines, seizures, dizziness, numbness/tingling.  Psychiatric: Reports increased anxiety.      /85 (BP Location: Left arm, Patient Position: Sitting, Cuff Size: Adult Regular)  Pulse 79  Temp 98  F (36.7  C) (Oral)  Resp 16  Ht 1.626 m (5' 4\")  Wt 95 kg (209 lb 6.4 oz)  LMP 05/22/2018  SpO2 97%  BMI 35.94 kg/m2    209 lbs 6.4 oz  5' 4\"   Body mass index is 35.94 kg/(m^2).    Physical Exam  Constitutional: Patient awake, seated upright in a chair, in no apparent distress.  Appears stated age.  Eyes: Pupils equal, round and reactive to light.  Extra ocular muscles intact. Sclera clear.  Conjunctiva normal.  HENT: Head normocephalic.  Oral pharynx intact with moist mucous membranes.  Dentition intact.  No thyromegaly appreciated.   Respiratory: Lung sounds clear to auscultation bilaterally.  No rales, rhonchi, or wheezing noted.    Cardiovascular: S1, S2, regular rate and rhythm.  No murmurs, rubs, or gallops noted. Radial and pedal pulses palpable, bilaterally.  No edema noted.   GI: Bowel sounds present.  Abdomen obese, soft, non-tender to light palpation.  No hepatosplenomegaly or masses palpated.   Genitourinary: Exam deferred.  Lymph/Hematologic: No cervical or supraclavicular lymphadenopathy noted.  No excessive bruising noted.    Skin: Color appropriate for race, warm, dry.     Musculoskeletal: Full extension of the neck.  Visible mass over the medial aspect of the right thigh, very firm to palpation.  No redness, warmth, or swelling of the joints noted. Gross motor strength is normal.    Neurologic: Alert, oriented to name, place and time. Cranial nerves II-XII are grossly intact. Gait is normal.      Neuropsychiatric: Calm, cooperative. Normal affect.     Labs:  5/29/18: Hgb: 14.1  5/29/18: Na: 139; K: 3.5; Cl: 107; Glu: 79; BUN: 13; Cr: " 0.69; Ca: 9.6; Bili total: 0.5; Albumin: 4.1; Alk phos: 65; ALT: 97; AST: 64      Assessment and Plan  Marylou Mortensen is a 32 year old female scheduled to undergo Biopsy Right Thigh Tumor on 5/31/18 with Dr. Dylon Mendez.    She has the following specific operative considerations:   1.  HIGH risk of postoperative nausea/vomiting with motion sickness: Recommend use of antiemetic agents in the perioperative period.    2.  Occasional GERD symptoms: Consider use of RSI techniques with advanced airway maneuvers.  3.  Obesity: Recommend careful positioning to prevent airway/ventilatory compromise, or tissue injury.    4.  Hgb, CMP today.    ADDENDUM:  Elevated ALT of 97 and AST of 64 noted on today's labs.  Recent lab results are not available.  Patient reports a recent history of elevated LFTs, being followed by Dr. Todd with [facility].  Called patient and advised her of the lab results.  Will forward them to Dr. Todd with [facility].  Do not anticipate that this will change the plan of care.    Revised Cardiac Risk Index: 0.4% risk of major adverse cardiac event.  VTE risk: 0.26%  ANDREA risk: Low  PONV risk score= 4.  (If > 2, anti-emetic intervention is recommended.)  Anesthesia considerations: Refer to PAC assessment in the anesthesia records.    Kathryn Rojas NP  Preoperative Assessment Center  [facility]  Phone: [phone]  Fax: [phone]

## 2018-05-29 NOTE — MR AVS SNAPSHOT
After Visit Summary   2018    Marylou Mortensen    MRN: [id number]           Patient Information     Date Of Birth          1985        Visit Information        Provider Department      2018 5:30 PM Rn, [facility] Preoperative [facility]        Care Instructions    Preparing for Your Surgery      Name:  Marylou Mortensen   MRN:  [id number]   :  1985   Today's Date:  2018     Arriving for surgery:  Surgery date:  2018  Arrival time:  8:30 am  Please come to:     [facility]  [address] [place] [id number]     Parking is available in front of the Clinics and Surgery Center building from 5:30AM to 8:00PM.  -  Proceed to the 5th floor to check into the Ambulatory Surgery Center.              >> There will be patient concierges on the 1st and 5th floor, for assistance or an escort, if you would like.              >> Please call [phone] with any questions.    What can I eat or drink?  -  You may have solid food or milk products until 8 hours prior to your surgery. Nothing after Midnight   -  You may have water, apple juice or 7up/Sprite until 2 hours prior to your surgery. Until 8 am     Which medicines can I take?      Hold ibuprofen for 24 hours before surgery     -  Please take these medications the day of surgery:     NONE        How do I prepare myself?  -  Take two showers: one the night before surgery; and one the morning of surgery.         Use Scrubcare or Hibiclens to wash from neck down.  You may use your own     shampoo and conditioner. No other hair products.   -  Do NOT use lotion, powder, deodorant, or antiperspirant the day of your surgery.  -  Do NOT wear any makeup, fingernail polish or jewelry.  - Do not bring your own medications to the hospital, except for inhalers and eye   drops.  -  Bring your ID and insurance card.    Questions or Concerns:  If you have questions or concerns  regarding the day of surgery, please call the Ambulatory Surgery Center call [phone].      After surgery please call your surgeons office.                     Follow-ups after your visit        Your next 10 appointments already scheduled     May 29, 2018  5:30 PM CDT   (Arrive by 5:15 PM)   PAC RN ASSESSMENT with Agnieszka Pac Rn   [facility] Preoperative [facility] ([facility])    [address] [place] [address]  4th Floor  [facility] [address] [phone]            May 31, 2018   Procedure with Dylon Mendez MD   [facility] ([facility])    [address] [place] [address]  5th [facility] [address] [phone]           Located in the Clinics and Surgery Center at [address] [place] [address].   parking is very convenient and highly recommended.  is a $6 flat rate fee.  Both  and self parkers should enter the main arrival plaza from [facility]; parking attendants will direct you based on your parking preference.              Who to contact     Please call your clinic at [phone] to:    Ask questions about your health    Make or cancel appointments    Discuss your medicines    Learn about your test results    Speak to your doctor            Additional Information About Your Visit        [employer] Information     [employer] is an electronic gateway that provides easy, online access to your medical records. With [facility], you can request a clinic appointment, read your test results, renew a prescription or communicate with your care team.     To sign up for [employer] visit the website at [email or web address]   You will be asked to enter the access code listed below, as well as some personal information. Please follow the directions to create your username and password.     Your access code is: [email or web address]  Expires: 2018 12:04 PM     Your access code will  in 90 days. If you need help or a  new code, please contact your [facility] or call [phone] for assistance.        Care EveryWhere ID     This is your Care EveryWhere ID. This could be used by other organizations to access your [place] medical records  [id number]        Your Vitals Were     Last Period                   05/22/2018            Blood Pressure from Last 3 Encounters:   05/29/18 143/85   05/04/18 126/84   08/14/07 110/68    Weight from Last 3 Encounters:   05/29/18 95 kg (209 lb 6.4 oz)   05/22/18 95.7 kg (211 lb)   05/04/18 95.7 kg (211 lb)              Today, you had the following     No orders found for display       Primary Care Provider Office Phone # Fax #    Avery Issa Snell [phone]       [address] [place] [address]        Equal Access to Services     [facility]: Hadii bharti morales hadasho Sotoan, waaxda luqadaha, qaybta kaalmada adejose davidyada, aurora joya . So [facility] [phone].    ATENCIÓN: Si habla español, tiene a fernandez disposición servicios gratuitos de asistencia lingüística. Llame al [phone].    We comply with applicable federal civil rights laws and [place] laws. We do not discriminate on the basis of race, color, national origin, age, disability, sex, sexual orientation, or gender identity.            Thank you!     Thank you for choosing [facility] PREOPERATIVE [facility]  for your care. Our goal is always to provide you with excellent care. Hearing back from our patients is one way we can continue to improve our services. Please take a few minutes to complete the written survey that you may receive in the mail after your visit with us. Thank you!             Your Updated Medication List - Protect others around you: Learn how to safely use, store and throw away your medicines at www.disposemymeds.org.          This list is accurate as of 5/29/18  4:35 PM.  Always use your most recent med list.                    Brand Name Dispense Instructions for use Diagnosis    AZATHIOPRINE PO      Take by mouth daily        IBUPROFEN PO      Take by mouth every 8 hours as needed for moderate pain        REMICADE IV      Every 6-8 weeks        VITAMIN D (CHOLECALCIFEROL) PO      Take 2,000 Units by mouth daily

## 2018-05-31 ENCOUNTER — ANESTHESIA (OUTPATIENT)
Dept: SURGERY | Facility: AMBULATORY SURGERY CENTER | Age: 33
End: 2018-05-31

## 2018-05-31 ENCOUNTER — SURGERY (OUTPATIENT)
Age: 33
End: 2018-05-31

## 2018-05-31 ENCOUNTER — HOSPITAL ENCOUNTER (OUTPATIENT)
Facility: AMBULATORY SURGERY CENTER | Age: 33
End: 2018-05-31
Attending: ORTHOPAEDIC SURGERY
Payer: COMMERCIAL

## 2018-05-31 VITALS
OXYGEN SATURATION: 92 % | DIASTOLIC BLOOD PRESSURE: 77 MMHG | RESPIRATION RATE: 16 BRPM | SYSTOLIC BLOOD PRESSURE: 121 MMHG | TEMPERATURE: 98.3 F | HEART RATE: 76 BPM

## 2018-05-31 DIAGNOSIS — R22.41 MASS OF RIGHT THIGH: Primary | ICD-10-CM

## 2018-05-31 LAB
HCG UR QL: NEGATIVE
INTERNAL QC OK POCT: NO

## 2018-05-31 RX ORDER — SODIUM CHLORIDE, SODIUM LACTATE, POTASSIUM CHLORIDE, CALCIUM CHLORIDE 600; 310; 30; 20 MG/100ML; MG/100ML; MG/100ML; MG/100ML
INJECTION, SOLUTION INTRAVENOUS CONTINUOUS
Status: DISCONTINUED | OUTPATIENT
Start: 2018-05-31 | End: 2018-05-31 | Stop reason: HOSPADM

## 2018-05-31 RX ORDER — SODIUM CHLORIDE, SODIUM LACTATE, POTASSIUM CHLORIDE, CALCIUM CHLORIDE 600; 310; 30; 20 MG/100ML; MG/100ML; MG/100ML; MG/100ML
INJECTION, SOLUTION INTRAVENOUS CONTINUOUS
Status: DISCONTINUED | OUTPATIENT
Start: 2018-05-31 | End: 2018-06-01 | Stop reason: HOSPADM

## 2018-05-31 RX ORDER — BUPIVACAINE HYDROCHLORIDE AND EPINEPHRINE 2.5; 5 MG/ML; UG/ML
INJECTION, SOLUTION INFILTRATION; PERINEURAL PRN
Status: DISCONTINUED | OUTPATIENT
Start: 2018-05-31 | End: 2018-05-31 | Stop reason: HOSPADM

## 2018-05-31 RX ORDER — PROPOFOL 10 MG/ML
INJECTION, EMULSION INTRAVENOUS CONTINUOUS PRN
Status: DISCONTINUED | OUTPATIENT
Start: 2018-05-31 | End: 2018-05-31

## 2018-05-31 RX ORDER — FENTANYL CITRATE 50 UG/ML
INJECTION, SOLUTION INTRAMUSCULAR; INTRAVENOUS PRN
Status: DISCONTINUED | OUTPATIENT
Start: 2018-05-31 | End: 2018-05-31

## 2018-05-31 RX ORDER — KETOROLAC TROMETHAMINE 30 MG/ML
INJECTION, SOLUTION INTRAMUSCULAR; INTRAVENOUS PRN
Status: DISCONTINUED | OUTPATIENT
Start: 2018-05-31 | End: 2018-05-31

## 2018-05-31 RX ORDER — ONDANSETRON 2 MG/ML
4 INJECTION INTRAMUSCULAR; INTRAVENOUS EVERY 30 MIN PRN
Status: DISCONTINUED | OUTPATIENT
Start: 2018-05-31 | End: 2018-06-01 | Stop reason: HOSPADM

## 2018-05-31 RX ORDER — NALOXONE HYDROCHLORIDE 0.4 MG/ML
.1-.4 INJECTION, SOLUTION INTRAMUSCULAR; INTRAVENOUS; SUBCUTANEOUS
Status: DISCONTINUED | OUTPATIENT
Start: 2018-05-31 | End: 2018-06-01 | Stop reason: HOSPADM

## 2018-05-31 RX ORDER — SODIUM CHLORIDE, SODIUM LACTATE, POTASSIUM CHLORIDE, CALCIUM CHLORIDE 600; 310; 30; 20 MG/100ML; MG/100ML; MG/100ML; MG/100ML
INJECTION, SOLUTION INTRAVENOUS CONTINUOUS PRN
Status: DISCONTINUED | OUTPATIENT
Start: 2018-05-31 | End: 2018-05-31

## 2018-05-31 RX ORDER — ONDANSETRON 2 MG/ML
INJECTION INTRAMUSCULAR; INTRAVENOUS PRN
Status: DISCONTINUED | OUTPATIENT
Start: 2018-05-31 | End: 2018-05-31

## 2018-05-31 RX ORDER — ACETAMINOPHEN 325 MG/1
975 TABLET ORAL ONCE
Status: COMPLETED | OUTPATIENT
Start: 2018-05-31 | End: 2018-05-31

## 2018-05-31 RX ORDER — FENTANYL CITRATE 50 UG/ML
25-50 INJECTION, SOLUTION INTRAMUSCULAR; INTRAVENOUS
Status: DISCONTINUED | OUTPATIENT
Start: 2018-05-31 | End: 2018-06-01 | Stop reason: HOSPADM

## 2018-05-31 RX ORDER — OXYCODONE HYDROCHLORIDE 5 MG/1
5-10 TABLET ORAL EVERY 6 HOURS PRN
Qty: 10 TABLET | Refills: 0 | Status: SHIPPED | OUTPATIENT
Start: 2018-05-31 | End: 2018-07-18

## 2018-05-31 RX ORDER — ONDANSETRON 4 MG/1
4 TABLET, ORALLY DISINTEGRATING ORAL EVERY 30 MIN PRN
Status: DISCONTINUED | OUTPATIENT
Start: 2018-05-31 | End: 2018-06-01 | Stop reason: HOSPADM

## 2018-05-31 RX ORDER — PROPOFOL 10 MG/ML
INJECTION, EMULSION INTRAVENOUS PRN
Status: DISCONTINUED | OUTPATIENT
Start: 2018-05-31 | End: 2018-05-31

## 2018-05-31 RX ORDER — LIDOCAINE HYDROCHLORIDE 20 MG/ML
INJECTION, SOLUTION INFILTRATION; PERINEURAL PRN
Status: DISCONTINUED | OUTPATIENT
Start: 2018-05-31 | End: 2018-05-31

## 2018-05-31 RX ORDER — OXYCODONE HYDROCHLORIDE 5 MG/1
5 TABLET ORAL
Status: COMPLETED | OUTPATIENT
Start: 2018-05-31 | End: 2018-05-31

## 2018-05-31 RX ORDER — GABAPENTIN 300 MG/1
300 CAPSULE ORAL ONCE
Status: COMPLETED | OUTPATIENT
Start: 2018-05-31 | End: 2018-05-31

## 2018-05-31 RX ORDER — GLYCOPYRROLATE 0.2 MG/ML
INJECTION, SOLUTION INTRAMUSCULAR; INTRAVENOUS PRN
Status: DISCONTINUED | OUTPATIENT
Start: 2018-05-31 | End: 2018-05-31

## 2018-05-31 RX ORDER — AMOXICILLIN 250 MG
1-2 CAPSULE ORAL 2 TIMES DAILY
Qty: 30 TABLET | Refills: 0 | Status: SHIPPED | OUTPATIENT
Start: 2018-05-31 | End: 2018-07-18

## 2018-05-31 RX ORDER — DEXAMETHASONE SODIUM PHOSPHATE 4 MG/ML
INJECTION, SOLUTION INTRA-ARTICULAR; INTRALESIONAL; INTRAMUSCULAR; INTRAVENOUS; SOFT TISSUE PRN
Status: DISCONTINUED | OUTPATIENT
Start: 2018-05-31 | End: 2018-05-31

## 2018-05-31 RX ORDER — LIDOCAINE 40 MG/G
CREAM TOPICAL
Status: DISCONTINUED | OUTPATIENT
Start: 2018-05-31 | End: 2018-05-31 | Stop reason: HOSPADM

## 2018-05-31 RX ADMIN — ONDANSETRON 4 MG: 2 INJECTION INTRAMUSCULAR; INTRAVENOUS at 09:57

## 2018-05-31 RX ADMIN — FENTANYL CITRATE 25 MCG: 50 INJECTION, SOLUTION INTRAMUSCULAR; INTRAVENOUS at 09:39

## 2018-05-31 RX ADMIN — FENTANYL CITRATE 50 MCG: 50 INJECTION, SOLUTION INTRAMUSCULAR; INTRAVENOUS at 09:24

## 2018-05-31 RX ADMIN — PROPOFOL 180 MG: 10 INJECTION, EMULSION INTRAVENOUS at 09:26

## 2018-05-31 RX ADMIN — KETOROLAC TROMETHAMINE 30 MG: 30 INJECTION, SOLUTION INTRAMUSCULAR; INTRAVENOUS at 09:58

## 2018-05-31 RX ADMIN — LIDOCAINE HYDROCHLORIDE 100 MG: 20 INJECTION, SOLUTION INFILTRATION; PERINEURAL at 09:26

## 2018-05-31 RX ADMIN — GLYCOPYRROLATE 0.2 MG: 0.2 INJECTION, SOLUTION INTRAMUSCULAR; INTRAVENOUS at 09:24

## 2018-05-31 RX ADMIN — DEXAMETHASONE SODIUM PHOSPHATE 4 MG: 4 INJECTION, SOLUTION INTRA-ARTICULAR; INTRALESIONAL; INTRAMUSCULAR; INTRAVENOUS; SOFT TISSUE at 09:32

## 2018-05-31 RX ADMIN — BUPIVACAINE HYDROCHLORIDE AND EPINEPHRINE 10 ML: 2.5; 5 INJECTION, SOLUTION INFILTRATION; PERINEURAL at 09:43

## 2018-05-31 RX ADMIN — OXYCODONE HYDROCHLORIDE 5 MG: 5 TABLET ORAL at 10:39

## 2018-05-31 RX ADMIN — ACETAMINOPHEN 975 MG: 325 TABLET ORAL at 08:57

## 2018-05-31 RX ADMIN — SODIUM CHLORIDE, SODIUM LACTATE, POTASSIUM CHLORIDE, CALCIUM CHLORIDE: 600; 310; 30; 20 INJECTION, SOLUTION INTRAVENOUS at 09:19

## 2018-05-31 RX ADMIN — GABAPENTIN 300 MG: 300 CAPSULE ORAL at 08:57

## 2018-05-31 RX ADMIN — PROPOFOL 20 MG: 10 INJECTION, EMULSION INTRAVENOUS at 09:39

## 2018-05-31 RX ADMIN — PROPOFOL 200 MCG/KG/MIN: 10 INJECTION, EMULSION INTRAVENOUS at 09:26

## 2018-05-31 NOTE — ANESTHESIA POSTPROCEDURE EVALUATION
Patient: Marylou Mortensen    Procedure(s):  Biopsy Right Thigh Tumor - Wound Class: I-Clean    Diagnosis:Tumor  Diagnosis Additional Information: No value filed.    Anesthesia Type:  General, LMA    Note:  Anesthesia Post Evaluation    Patient location during evaluation: Phase 2  Patient participation: Able to fully participate in evaluation  Level of consciousness: awake and alert  Pain management: adequate  Airway patency: patent  Cardiovascular status: acceptable  Respiratory status: acceptable  Hydration status: acceptable  PONV: none     Anesthetic complications: None          Last vitals:  Vitals:    05/31/18 1030 05/31/18 1043 05/31/18 1058   BP: 116/75 116/68 121/77   Pulse:      Resp: 16 16 16   Temp:  36.9  C (98.4  F) 36.8  C (98.3  F)   SpO2: 94% 94% 92%         Electronically Signed By: Michael Yanez DO  May 31, 2018  12:41 PM

## 2018-05-31 NOTE — BRIEF OP NOTE
[facility] Brief Operative Note    Pre-operative diagnosis: Tumor   Post-operative diagnosis * No post-op diagnosis entered *   Procedure: Procedure(s):  Biopsy Right Thigh Tumor - Wound Class: I-Clean   Surgeon: Andrea   Assistants(s): Lolly Bartlett   Estimated blood loss: See OR record or Op report    Specimens: Right thigh masses sent for frozen and final pathology.    Findings: See dictation.     -WBAT RLE  -Remove aquacel on POD#7  -Follow up in clinic in 2 weeks.     Pinky Santos PGY-4  Orthopedic Surgery  [phone]

## 2018-05-31 NOTE — IP AVS SNAPSHOT
MRN:[id number]                      After Visit Summary   5/31/2018    Marylou Mortensen    MRN: [id number]           Thank you!     Thank you for choosing [facility] for your care. Our goal is always to provide you with excellent care. Hearing back from our patients is one way we can continue to improve our services. Please take a few minutes to complete the written survey that you may receive in the mail after you visit with us. Thank you!        Patient Information     Date Of Birth          1985        About your hospital stay     You were admitted on:  May 31, 2018 [facility]    You were discharged on:  May 31, 2018       Who to Call     For medical emergencies, please call 911.  For non-urgent questions about your medical care, please call your primary care provider or clinic, [phone]  For questions related to your surgery, please call your surgery clinic        Attending Provider     Provider Dylon Nixon MD Orthopedics       Primary Care Provider Office Phone # Fax #    Avery Issa Snell [phone]      After Care Instructions      Diet as Tolerated       Return to diet before surgery, unless instructed otherwise.            Discharge Instructions       Review outpatient procedure discharge instructions with patient as directed by Provider            Dressing Change       You may shower with the surgical dressing on as it is water resistant. Remove the sticky, brown aquacel dressing 7 days after surgery. At that time, it is ok to shower without the dressing on, but you should not soak in a tub, pool or lake and should not scrub aggressively.            Dressing Change        IF leaking, remove and redress. Wash hands before and after and use gloves.            Ice to affected area       Ice pack to surgical site every 15 minutes per hour for 24 hours            Return to clinic   "     Return to clinic in 2 weeks            Weight bearing - As tolerated           Wound care       Do not immerse wound in water until sutures removed                  Further instructions from your care team       [facility]  Home Care Following Anesthesia  For 24 hours after surgery:  1. Get plenty of rest.  A responsible adult must stay with you for at least 24 hours after you leave the surgery center.  2. Do not drive or use heavy equipment.  If you have weakness or tingling, don't drive or use heavy equipment until this feeling goes away.   3. Do not drink alcohol.   4. Avoid strenuous or risky activities.  Ask for help when climbing stairs.  5. You may feel lightheaded.  IF so, sit for a few minutes before standing.  Have someone help you get up.   6. If you have nausea (feel sick to your stomach): Drink only clear liquids such as apple juice, ginger ale, broth or 7-Up.  Rest may also help.  Be sure to drink enough fluids.  Move to a regular diet as you feel able.   7. You may have a slight fever.  Call the doctor if your fever is over 100 F (37.7 C) (taken under the tongue) or lasts longer than 24 hours.  8. You may have a dry mouth, a sore throat, muscle aches or trouble sleeping. These should go away after 24 hours.  9. Do not make important or legal decisions.   Today you received a Marcaine or bupivacaine block to numb the nerves near your surgery site.  This is a block using local anesthetic or \"numbing\" medication injected around the nerves to anesthetize or \"numb\" the area supplied by those nerves.  This block is injected into the muscle layer near your surgical site.  The medication may numb the location where you had surgery for 6-18 hours, but may last up to 24 hours.  If your surgical site is an arm or leg you should be careful with your affected limb, since it is possible to injure your limb without being aware of it due to the numbing.  Until full feeling " returns, you should guard against bumping or hitting your limb, and avoid extreme hot or cold temperatures on the skin.  As the block wears off, the feeling will return as a tingling or prickly sensation near your surgical site.  You will experience more discomfort from your incision as the feeling returns.  You may want to take a pain pill (a narcotic or Tylenol if this was prescribed by your surgeon) when you start to experience mild pain before the pain beccomes more severe.  If your pain medications do not control your pain you should notifiy your surgeon.    Tips for taking pain medications  To get the best pain relief possible, remember these points:    Take pain medications as directed, before pain becomes severe.    Pain medication can upset your stomach: taking it with food may help.    Constipation is a common side effect of pain medication. Drink plenty of  fluids.    Eat foods high in fiber. Take a stool softener if recommended by your doctor or pharmacist.    Do not drink alcohol, drive or operate machinery while taking pain medications.    Ask about other ways to control pain, such as with heat, ice or relaxation.    Tylenol/Acetaminophen Consumption  To help encourage the safe use of acetaminophen, the makers of TYLENOL  have lowered the maximum daily dose for single-ingredient Extra Strength TYLENOL  (acetaminophen) products sold in the [place] from 8 pills per day (4,000 mg) to 6 pills per day (3,000 mg). The dosing interval has also changed from 2 pills every 4-6 hours to 2 pills every 6 hours.    If you feel your pain relief is insufficient, you may take Tylenol/Acetaminophen in addition to your narcotic pain medication.     Be careful not to exceed 3,000 mg of Tylenol/Acetaminophen in a 24 hour period from all sources.    If you are taking extra strength Tylenol/acetaminophen (500 mg), the maximum dose is 6 tablets in 24 hours.    If you are taking regular strength acetaminophen (325 mg), the  maximum dose is 9 tablets in 24 hours.    Call a doctor for any of the followin. Signs of infection (fever, growing tenderness at the surgery site, a large amount of drainage or bleeding, severe pain, foul-smelling drainage, redness, swelling).  2. It has been over 8 to 10 hours since surgery and you are still not able to urinate (pass water).  3. Headache for over 24 hours.  Your doctor is:  Dr. Dylon Mendez, Orthopaedics: [phone]  Or dial [phone] and ask for the resident on call for:  Orthopaedics  For emergency care, call the:  [facility] [place] [facility]: [phone] (TTY for hearing impaired: [phone])    Pending Results     Date and Time Order Name Status Description    2018 0944 Surgical pathology exam In process             Admission Information     Date & Time Provider Department Dept. Phone    2018 Dylon Mendez MD [facility] [phone]      Your Vitals Were     Blood Pressure Pulse Temperature Respirations Last Period Pulse Oximetry    136/99 (Cuff Size: Adult Regular) 76 98.4  F (36.9  C) (Oral) 16 2018 97%      MyChart Information     [facility] app is an electronic gateway that provides easy, online access to your medical records. With [facility] app, you can request a clinic appointment, read your test results, renew a prescription or communicate with your care team.     To sign up for [facility] app visit the website at [email or web address]   You will be asked to enter the access code listed below, as well as some personal information. Please follow the directions to create your username and password.     Your access code is: [email or web address]  Expires: 2018 12:04 PM     Your access code will  in 90 days. If you need help or a new code, please contact your [facility] or call [phone] for assistance.        Care EveryWhere ID     This is your Care EveryWhere ID. This could be used by other  organizations to access your [facility] medical records  [id number]        Equal Access to Services     CHRISTINA RODARTE : Hadii bharti Pena, wajohnda william, qasydney jordanmamalou knapp, aurora mayo. So [facility] [phone].    ATENCIÓN: Si habla español, tiene a fernandez disposición servicios gratuitos de asistencia lingüística. Llame al [phone].    We comply with applicable federal civil rights laws and [place] laws. We do not discriminate on the basis of race, color, national origin, age, disability, sex, sexual orientation, or gender identity.               Review of your medicines      START taking        Dose / Directions    oxyCODONE IR 5 MG tablet   Commonly known as:  ROXICODONE   Used for:  Mass of right thigh        Dose:  5-10 mg   Take 1-2 tablets (5-10 mg) by mouth every 6 hours as needed for pain or other (Moderate to Severe)   Quantity:  10 tablet   Refills:  0       senna-docusate 8.6-50 MG per tablet   Commonly known as:  SENOKOT-S;PERICOLACE   Used for:  Mass of right thigh        Dose:  1-2 tablet   Take 1-2 tablets by mouth 2 times daily Take while on oral narcotics to prevent or treat constipation.   Quantity:  30 tablet   Refills:  0         CONTINUE these medicines which have NOT CHANGED        Dose / Directions    AZATHIOPRINE PO        Take by mouth daily   Refills:  0       IBUPROFEN PO        Take by mouth every 8 hours as needed for moderate pain   Refills:  0       REMICADE IV        Every 6-8 weeks   Refills:  0       VITAMIN D (CHOLECALCIFEROL) PO        Dose:  2000 Units   Take 2,000 Units by mouth daily   Refills:  0            Where to get your medicines      These medications were sent to [facility] [place], [place] - [address]  [address], [place] [address]    Hours:  TRANSPLANT PHONE NUMBER [phone] Phone:  [phone]     senna-docusate 8.6-50 MG per tablet         Some of  these will need a paper prescription and others can be bought over the counter. Ask your nurse if you have questions.     Bring a paper prescription for each of these medications     oxyCODONE IR 5 MG tablet                Protect others around you: Learn how to safely use, store and throw away your medicines at www.disposemymeds.org.        Information about OPIOIDS     PRESCRIPTION OPIOIDS: WHAT YOU NEED TO KNOW   You have a prescription for an opioid (narcotic) pain medicine. Opioids can cause addiction. If you have a history of chemical dependency of any type, you are at a higher risk of becoming addicted to opioids. Only take this medicine after all other options have been tried. Take it for as short a time and as few doses as possible.     Do not:    Drive. If you drive while taking these medicines, you could be arrested for driving under the influence (DUI).    Operate heavy machinery    Do any other dangerous activities while taking these medicines.     Drink any alcohol while taking these medicines.      Take with any other medicines that contain acetaminophen. Read all labels carefully. Look for the word  acetaminophen  or  Tylenol.  Ask your pharmacist if you have questions or are unsure.    Store your pills in a secure place, locked if possible. We will not replace any lost or stolen medicine. If you don t finish your medicine, please throw away (dispose) as directed by your pharmacist. The [place] Pollution Control Agency has more information about safe disposal: https://www.pca.[facility].[place].us/living-green/managing-unwanted-medications    All opioids tend to cause constipation. Drink plenty of water and eat foods that have a lot of fiber, such as fruits, vegetables, prune juice, apple juice and high-fiber cereal. Take a laxative (Miralax, milk of magnesia, Colace, Senna) if you don t move your bowels at least every other day.              Medication List: This is a list of all your medications and when  to take them. Check marks below indicate your daily home schedule. Keep this list as a reference.      Medications           Morning Afternoon Evening Bedtime As Needed    AZATHIOPRINE PO   Take by mouth daily                                IBUPROFEN PO   Take by mouth every 8 hours as needed for moderate pain                                oxyCODONE IR 5 MG tablet   Commonly known as:  ROXICODONE   Take 1-2 tablets (5-10 mg) by mouth every 6 hours as needed for pain or other (Moderate to Severe)                                REMICADE IV   Every 6-8 weeks                                senna-docusate 8.6-50 MG per tablet   Commonly known as:  SENOKOT-S;PERICOLACE   Take 1-2 tablets by mouth 2 times daily Take while on oral narcotics to prevent or treat constipation.                                VITAMIN D (CHOLECALCIFEROL) PO   Take 2,000 Units by mouth daily

## 2018-05-31 NOTE — ANESTHESIA CARE TRANSFER NOTE
Patient: Marylou Mortensen    Procedure(s):  Biopsy Right Thigh Tumor - Wound Class: I-Clean    Diagnosis: Tumor  Diagnosis Additional Information: No value filed.    Anesthesia Type:   General, LMA     Note:  Airway :Nasal Cannula  Patient transferred to:PACU  Comments: Arrive PACU, Stable, Airway Intact  111/75, 82,10,94%  All questions answered.  Handoff Report: Identifed the Patient, Identified the Reponsible Provider, Reviewed the pertinent medical history, Discussed the surgical course, Reviewed Intra-OP anesthesia mangement and issues during anesthesia, Set expectations for post-procedure period and Allowed opportunity for questions and acknowledgement of understanding      Vitals: (Last set prior to Anesthesia Care Transfer)    CRNA VITALS  5/31/2018 0947 - 5/31/2018 1020      5/31/2018             Pulse: 90    SpO2: 95 %    Resp Rate (observed): 16                Electronically Signed By: HARESH Salazar CRNA  May 31, 2018  10:20 AM

## 2018-05-31 NOTE — DISCHARGE INSTRUCTIONS
"[facility]  Home Care Following Anesthesia  For 24 hours after surgery:  1. Get plenty of rest.  A responsible adult must stay with you for at least 24 hours after you leave the surgery center.  2. Do not drive or use heavy equipment.  If you have weakness or tingling, don't drive or use heavy equipment until this feeling goes away.   3. Do not drink alcohol.   4. Avoid strenuous or risky activities.  Ask for help when climbing stairs.  5. You may feel lightheaded.  IF so, sit for a few minutes before standing.  Have someone help you get up.   6. If you have nausea (feel sick to your stomach): Drink only clear liquids such as apple juice, ginger ale, broth or 7-Up.  Rest may also help.  Be sure to drink enough fluids.  Move to a regular diet as you feel able.   7. You may have a slight fever.  Call the doctor if your fever is over 100 F (37.7 C) (taken under the tongue) or lasts longer than 24 hours.  8. You may have a dry mouth, a sore throat, muscle aches or trouble sleeping. These should go away after 24 hours.  9. Do not make important or legal decisions.   Today you received a Marcaine or bupivacaine block to numb the nerves near your surgery site.  This is a block using local anesthetic or \"numbing\" medication injected around the nerves to anesthetize or \"numb\" the area supplied by those nerves.  This block is injected into the muscle layer near your surgical site.  The medication may numb the location where you had surgery for 6-18 hours, but may last up to 24 hours.  If your surgical site is an arm or leg you should be careful with your affected limb, since it is possible to injure your limb without being aware of it due to the numbing.  Until full feeling returns, you should guard against bumping or hitting your limb, and avoid extreme hot or cold temperatures on the skin.  As the block wears off, the feeling will return as a tingling or prickly sensation near your " surgical site.  You will experience more discomfort from your incision as the feeling returns.  You may want to take a pain pill (a narcotic or Tylenol if this was prescribed by your surgeon) when you start to experience mild pain before the pain beccomes more severe.  If your pain medications do not control your pain you should notifiy your surgeon.    Tips for taking pain medications  To get the best pain relief possible, remember these points:    Take pain medications as directed, before pain becomes severe.    Pain medication can upset your stomach: taking it with food may help.    Constipation is a common side effect of pain medication. Drink plenty of  fluids.    Eat foods high in fiber. Take a stool softener if recommended by your doctor or pharmacist.    Do not drink alcohol, drive or operate machinery while taking pain medications.    Ask about other ways to control pain, such as with heat, ice or relaxation.    Tylenol/Acetaminophen Consumption  To help encourage the safe use of acetaminophen, the makers of TYLENOL  have lowered the maximum daily dose for single-ingredient Extra Strength TYLENOL  (acetaminophen) products sold in the [place] from 8 pills per day (4,000 mg) to 6 pills per day (3,000 mg). The dosing interval has also changed from 2 pills every 4-6 hours to 2 pills every 6 hours.    If you feel your pain relief is insufficient, you may take Tylenol/Acetaminophen in addition to your narcotic pain medication.     Be careful not to exceed 3,000 mg of Tylenol/Acetaminophen in a 24 hour period from all sources.    If you are taking extra strength Tylenol/acetaminophen (500 mg), the maximum dose is 6 tablets in 24 hours.    If you are taking regular strength acetaminophen (325 mg), the maximum dose is 9 tablets in 24 hours.    Call a doctor for any of the followin. Signs of infection (fever, growing tenderness at the surgery site, a large amount of drainage or bleeding, severe pain, foul-smelling  drainage, redness, swelling).  2. It has been over 8 to 10 hours since surgery and you are still not able to urinate (pass water).  3. Headache for over 24 hours.  Your doctor is:  Dr. Dylon Mendez, Orthopaedics: [phone]  Or dial [phone] and ask for the resident on call for:  Orthopaedics  For emergency care, call the:  [facility] [place] [facility] Department: [phone] (TTY for hearing impaired: [phone])

## 2018-05-31 NOTE — IP AVS SNAPSHOT
[facility]    [address] [place] [address]    Phone:  [phone]    Fax:  [phone]                                       After Visit Summary   5/31/2018    Marylou Mortensen    MRN: [id number]           After Visit Summary Signature Page     I have received my discharge instructions, and my questions have been answered. I have discussed any challenges I see with this plan with the nurse or doctor.    ..........................................................................................................................................  Patient/Patient Representative Signature      ..........................................................................................................................................  Patient Representative Print Name and Relationship to Patient    ..................................................               ................................................  Date                                            Time    ..........................................................................................................................................  Reviewed by Signature/Title    ...................................................              ..............................................  Date                                                            Time

## 2018-05-31 NOTE — OP NOTE
Preoperative diagnosis: Tumor right thigh    Postoperative diagnosis: Benign tumor right thigh suspected    Procedure: Performed biopsy tumor right thigh    Surgeons: Idris Mendez and Sheela GRIFFITH.  Ms. Bartlett's assistance was required throughout the procedure for retraction and hemostasis assistance.    Pathology submitted: 1.  Frozen section tumor right thigh.  2.  Final pathology sent fresh    Estimated blood loss: 2 cc    Patient was interviewed in the preoperative area risk and benefits were again reviewed consent was signed the surgical site was marked with my initials and line of intended incision.  Consent was signed.  Preoperative brief had been performed.  Patient taken the operating room received a general anesthetic in the supine position the right lower extremity was prepped and draped sterilely.  Surgical timeout was performed.    A 2 inch incision over the midportion of the tumor in the medial thigh was made.  Dissection was taken down through the muscle.  A 10 blade was used to obtain the biopsy specimen.  Frozen section analysis confirmed the presence of diagnostic tissue.  The wound was irrigated and closed in standard fashion.    Plan: 1.  See the patient back in 2 weeks for wound inspection and discussion of treatment options.  2.  We will contact her in the interim if we obtain the final diagnosis.

## 2018-06-01 ENCOUNTER — TELEPHONE (OUTPATIENT)
Dept: ORTHOPEDICS | Facility: CLINIC | Age: 33
End: 2018-06-01

## 2018-06-01 NOTE — TELEPHONE ENCOUNTER
[facility] Call Center    Phone Message    May a detailed message be left on voicemail: yes    Reason for Call: Other: Biopsy of the right thigh completed yesterday 5.31.18 with Dr. Mendez.  Marylou is a .   She bends and kneels with a fair amount of walking. Are there any work restrictions?     Action Taken: Message routed to:  [facility] ([facility]    06/01/2018  Message reviewed with Dr Andrea Kenney's care coordinator.  Ideally patient would avoid bending and kneeling, and excessive walking for at least 2 weeks.  Patient verbalized understanding and is agreeable with this.

## 2018-06-06 LAB — COPATH REPORT: NORMAL

## 2018-06-07 NOTE — TELEPHONE ENCOUNTER
Pt is calling and wants to know the results from the biopsy and when she should come back to have some of the stitches removed. She never got any information to follow up so she is wondering if she needs an post op appointment too.please advise.

## 2018-06-08 ENCOUNTER — TELEPHONE (OUTPATIENT)
Dept: ORTHOPEDICS | Facility: CLINIC | Age: 33
End: 2018-06-08

## 2018-06-08 DIAGNOSIS — D17.20 LIPOMA OF EXTREMITY: Primary | ICD-10-CM

## 2018-06-08 NOTE — TELEPHONE ENCOUNTER
Dr. Mendez called Marylou to discuss the final pathology report from her recent surgery.  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  He is recommending surgery to remove the tumor.  She has chosen Thursday, 7/26/18, for surgery date.  Preparation for surgery was discussed. She will obtain an updated H&P at her primary care clinic.  Surgery was mailed to her home.    She has a scheduled Remicade infusion next week.  This will be 6-7 weeks before her surgery.  That is a long enough interval between the medication and surgery.  She understands that she will be need to wait 2-3 weeks post-op before the next infusion

## 2018-06-10 ENCOUNTER — HEALTH MAINTENANCE LETTER (OUTPATIENT)
Age: 33
End: 2018-06-10

## 2018-06-25 ENCOUNTER — TELEPHONE (OUTPATIENT)
Dept: FAMILY MEDICINE | Facility: CLINIC | Age: 33
End: 2018-06-25

## 2018-06-25 NOTE — LETTER
June 25, 2018    Marylou Mortensen  [address]   [place] [address]      Dear Marylou Mortensen,     In order to ensure we are providing the best quality care, we have reviewed your chart and see that you are due for:    Physical with pap smear: Pap smear is a screening test used to detect cervical cancer. It is recommended every three years for women 21 and older. The test should be done at least once every three years but women who are at greater risk for cervical cancer may need to have the test more often.    Please call the clinic at your earliest convenience to schedule an appointment. If you have had any of the screenings listed above at another facility, please call us so that we may update your chart.      Thank you for trusting us with your health care.    Sincerely,    [facility]/mb  [phone]  [phone]

## 2018-06-25 NOTE — TELEPHONE ENCOUNTER
Panel Management Review      BP Readings from Last 1 Encounters:   05/31/18 121/77      Last Office Visit with this department: 5/22/2018    Fail List measure: PAP      Patient is due/failing the following:   PAP    Action needed:   Patient needs office visit for PHYSICAL.    Type of outreach:    Sent letter.    Questions for provider review:    None                                                                                                                                    Cooper Miranda MA      Chart routed to  .

## 2018-07-18 ENCOUNTER — OFFICE VISIT (OUTPATIENT)
Dept: FAMILY MEDICINE | Facility: CLINIC | Age: 33
End: 2018-07-18
Payer: COMMERCIAL

## 2018-07-18 VITALS
SYSTOLIC BLOOD PRESSURE: 129 MMHG | BODY MASS INDEX: 36.19 KG/M2 | OXYGEN SATURATION: 94 % | TEMPERATURE: 98.2 F | HEIGHT: 64 IN | DIASTOLIC BLOOD PRESSURE: 86 MMHG | HEART RATE: 78 BPM | WEIGHT: 212 LBS | RESPIRATION RATE: 20 BRPM

## 2018-07-18 DIAGNOSIS — Z01.818 PREOP GENERAL PHYSICAL EXAM: Primary | ICD-10-CM

## 2018-07-18 DIAGNOSIS — K50.80 CROHN'S DISEASE OF BOTH SMALL AND LARGE INTESTINE WITHOUT COMPLICATION (H): ICD-10-CM

## 2018-07-18 DIAGNOSIS — R22.41 MASS OF RIGHT THIGH: ICD-10-CM

## 2018-07-18 LAB
ALBUMIN SERPL-MCNC: 4 G/DL (ref 3.4–5)
ALP SERPL-CCNC: 68 U/L (ref 40–150)
ALT SERPL W P-5'-P-CCNC: 109 U/L (ref 0–50)
ANION GAP SERPL CALCULATED.3IONS-SCNC: 9 MMOL/L (ref 3–14)
AST SERPL W P-5'-P-CCNC: 59 U/L (ref 0–45)
BETA HCG QUAL IFA URINE: NEGATIVE
BILIRUB SERPL-MCNC: 0.4 MG/DL (ref 0.2–1.3)
BUN SERPL-MCNC: 13 MG/DL (ref 7–30)
CALCIUM SERPL-MCNC: 9.4 MG/DL (ref 8.5–10.1)
CHLORIDE SERPL-SCNC: 107 MMOL/L (ref 94–109)
CO2 SERPL-SCNC: 24 MMOL/L (ref 20–32)
CREAT SERPL-MCNC: 0.6 MG/DL (ref 0.52–1.04)
GFR SERPL CREATININE-BSD FRML MDRD: >90 ML/MIN/1.7M2
GLUCOSE SERPL-MCNC: 114 MG/DL (ref 70–99)
HGB BLD-MCNC: 13.6 G/DL (ref 11.7–15.7)
POTASSIUM SERPL-SCNC: 3.4 MMOL/L (ref 3.4–5.3)
PROT SERPL-MCNC: 8.5 G/DL (ref 6.8–8.8)
SODIUM SERPL-SCNC: 140 MMOL/L (ref 133–144)

## 2018-07-18 PROCEDURE — 84703 CHORIONIC GONADOTROPIN ASSAY: CPT | Performed by: NURSE PRACTITIONER

## 2018-07-18 PROCEDURE — 36415 COLL VENOUS BLD VENIPUNCTURE: CPT | Performed by: NURSE PRACTITIONER

## 2018-07-18 PROCEDURE — 93000 ELECTROCARDIOGRAM COMPLETE: CPT | Performed by: NURSE PRACTITIONER

## 2018-07-18 PROCEDURE — 85018 HEMOGLOBIN: CPT | Performed by: NURSE PRACTITIONER

## 2018-07-18 PROCEDURE — 99214 OFFICE O/P EST MOD 30 MIN: CPT | Performed by: NURSE PRACTITIONER

## 2018-07-18 PROCEDURE — 80053 COMPREHEN METABOLIC PANEL: CPT | Performed by: NURSE PRACTITIONER

## 2018-07-18 ASSESSMENT — PAIN SCALES - GENERAL: PAINLEVEL: NO PAIN (0)

## 2018-07-18 NOTE — MR AVS SNAPSHOT
After Visit Summary   7/18/2018    Marylou Mortensen    MRN: [id number]           Patient Information     Date Of Birth          1985        Visit Information        Provider Department      7/18/2018 4:20 PM Chen Valiente APRN CNP [facility]        Today's Diagnoses     Preop general physical exam    -  1    Mass of right thigh        Crohn's disease of both small and large intestine without complication (H)          Care Instructions      Before Your Surgery      Call your surgeon if there is any change in your health. This includes signs of a cold or flu (such as a sore throat, runny nose, cough, rash or fever).    Do not smoke, drink alcohol or take over the counter medicine (unless your surgeon or primary care doctor tells you to) for the 24 hours before and after surgery.    If you take prescribed drugs: Follow your doctor s orders about which medicines to take and which to stop until after surgery.    Eating and drinking prior to surgery: follow the instructions from your surgeon    Take a shower or bath the night before surgery. Use the soap your surgeon gave you to gently clean your skin. If you do not have soap from your surgeon, use your regular soap. Do not shave or scrub the surgery site.  Wear clean pajamas and have clean sheets on your bed.     At [facility], we strive to deliver an exceptional experience to you, every time we see you.  If you receive a survey in the mail, please send us back your thoughts. We really do value your feedback.    Based on your medical history, these are the current health maintenance/preventive care services that you are due for (some may have been done at this visit.)  Health Maintenance Due   Topic Date Due     PHQ-2 Q1 YR  07/15/1996     TETANUS IMMUNIZATION (SYSTEM ASSIGNED)  07/15/2002     HIV SCREEN (SYSTEM ASSIGNED)  07/15/2002     PAP SCREENING Q3 YR (SYSTEM ASSIGNED)  07/15/2005          Suggested websites for health information:  Www.[place].org : Up to date and easily searchable information on multiple topics.  Www.medlineplus.gov : medication info, interactive tutorials, watch real surgeries online  Www.familydoctor.org : good info from the Academy of Family Physicians  Www.cdc.gov : public health info, travel advisories, epidemics (H1N1)  Www.aap.org : children's health info, normal development, vaccinations  Www.health.[facility].[place].us : [place] dept of health, public health issues in [place], N1N1    Your care team:                            Family Medicine Internal Medicine   MD Riccardo Jackson MD Shantel Branch-Fleming, MD Katya Georgiev PA-C Nam Ho, MD Pediatrics   MARIVEL Garnett, HAYDEN Puckett APRN CNP   MD Cheryl Dee MD Deborah Mielke, MD Kim Thein, APRN CNP      Clinic hours: Monday - Thursday 7 am-7 pm; Fridays 7 am-5 pm.   Urgent care: Monday - Friday 11 am-9 pm; Saturday and Sunday 9 am-5 pm.  Pharmacy : Monday -Thursday 8 am-8 pm; Friday 8 am-6 pm; Saturday and Sunday 9 am-5 pm.     Clinic: [phone]   Pharmacy: [phone]            Follow-ups after your visit        Your next 10 appointments already scheduled     Jul 26, 2018   Procedure with Dylon Mendez MD   [facility] ([facility])    [address] [place] [address]  5th [facility] [address] [phone]           Located in the Clinics and Surgery Center at [address] [place] [address].   parking is very convenient and highly recommended.  is a $6 flat rate fee.  Both  and self parkers should enter the main arrival plaza from [facility]; parking attendants will direct you based on your parking preference.              Who to contact     If you have questions or need follow up information about today's clinic visit or your schedule please contact [facility]  "ALINA DING directly at [phone].  Normal or non-critical lab and imaging results will be communicated to you by MyChart, letter or phone within 4 business days after the clinic has received the results. If you do not hear from us within 7 days, please contact the clinic through [employer] or phone. If you have a critical or abnormal lab result, we will notify you by phone as soon as possible.  Submit refill requests through "[employer] or call your pharmacy and they will forward the refill request to us. Please allow 3 business days for your refill to be completed.          Additional Information About Your Visit        [email or web address] Information     "[employer] gives you secure access to your electronic health record. If you see a primary care provider, you can also send messages to your care team and make appointments. If you have questions, please call your primary care clinic.  If you do not have a primary care provider, please call [phone] and they will assist you.        Care EveryWhere ID     This is your Care EveryWhere ID. This could be used by other organizations to access your [place] medical records  [id number]        Your Vitals Were     Pulse Temperature Respirations Height Last Period Pulse Oximetry    78 98.2  F (36.8  C) (Oral) 20 5' 4\" (1.626 m) 07/11/2018 (Approximate) 94%    Breastfeeding? BMI (Body Mass Index)                No 36.39 kg/m2           Blood Pressure from Last 3 Encounters:   07/18/18 129/86   05/31/18 121/77   05/29/18 143/85    Weight from Last 3 Encounters:   07/18/18 212 lb (96.2 kg)   05/29/18 209 lb 6.4 oz (95 kg)   05/22/18 211 lb (95.7 kg)              We Performed the Following     Beta HCG Qual, Urine - [facility] and [place] ([id number])     Comprehensive metabolic panel     EKG 12-lead complete w/read - Clinics     Hemoglobin        Primary Care Provider Office Phone # Fax #    Avery Issa Snell [phone]       EMERGENCY DEPARTMENT [address] " [facility] [address]        Equal Access to Services     [facility] CAYDEN : Kash Pena, keri thomas, aurora barton. So [facility] [phone].    ATENCIÓN: Si habla español, tiene a fernandez disposición servicios gratuitos de asistencia lingüística. [facility] [phone].    We comply with applicable federal civil rights laws and [place] laws. We do not discriminate on the basis of race, color, national origin, age, disability, sex, sexual orientation, or gender identity.            Thank you!     Thank you for choosing [facility]  for your care. Our goal is always to provide you with excellent care. Hearing back from our patients is one way we can continue to improve our services. Please take a few minutes to complete the written survey that you may receive in the mail after your visit with us. Thank you!             Your Updated Medication List - Protect others around you: Learn how to safely use, store and throw away your medicines at www.disposemymeds.org.          This list is accurate as of 7/18/18  5:15 PM.  Always use your most recent med list.                   Brand Name Dispense Instructions for use Diagnosis    AZATHIOPRINE PO      Take by mouth daily        IBUPROFEN PO      Take by mouth every 8 hours as needed for moderate pain        REMICADE IV      Every 6-8 weeks        VITAMIN D (CHOLECALCIFEROL) PO      Take 2,000 Units by mouth daily

## 2018-07-18 NOTE — Clinical Note
Please fax to surgeon office and leave copy at desk for patient to . Please notify patient when ready.

## 2018-07-18 NOTE — PROGRESS NOTES
[address] [address]  [phone]  Dept: [phone]    PRE-OP EVALUATION:  Today's date: 2018    Marylou Mortensen (: 1985) presents for pre-operative evaluation assessment as requested by Dr. Mendez.  She requires evaluation and anesthesia risk assessment prior to undergoing surgery/procedure for treatment of Right Thigh Tumor Removal  .    Proposed Surgery/ Procedure: EXCISE SOFT TISSUE TUMOR THIGH  Date of Surgery/ Procedure: 18  Time of Surgery/ Procedure: 8:35 AM  Hospital/Surgical Facility: [facility]  Primary Physician: Avery Snell  Type of Anesthesia Anticipated: General    Patient has a Health Care Directive or Living Will:  NO    1. NO - Do you have a history of heart attack, stroke, stent, bypass or surgery on an artery in the head, neck, heart or legs?  2. NO - Do you ever have any pain or discomfort in your chest?  3. NO - Do you have a history of  Heart Failure?  4. NO - Are you troubled by shortness of breath when: walking on the level, up a slight hill or at night?  5. NO - Do you currently have a cold, bronchitis or other respiratory infection?  6. NO - Do you have a cough, shortness of breath or wheezing?  7. NO - Do you sometimes get pains in the calves of your legs when you walk?  8. NO - Do you or anyone in your family have previous history of blood clots?  9. NO - Do you or does anyone in your family have a serious bleeding problem such as prolonged bleeding following surgeries or cuts?  10. YES - HAVE YOU EVER HAD PROBLEMS WITH ANEMIA OR BEEN TOLD TO TAKE IRON PILLS? Mild anemia. No interventions. Periods irregular and flow varies and lasts 1-5 days. No dizziness, headaches.  11. NO - Have you had any abnormal blood loss such as black, tarry or bloody stools, or abnormal vaginal bleeding?  12. NO - Have you ever had a blood transfusion?  13. NO - Have you or any of your relatives ever had  "problems with anesthesia?  14. YES - DO YOU HAVE SLEEP APNEA, EXCESSIVE SNORING OR DAYTIME DROWSINESS? Tired during the day frequently   15. NO - Do you have any prosthetic heart valves?  16. NO - Do you have prosthetic joints?  17. NO - Is there any chance that you may be pregnant?        HPI:     HPI related to upcoming procedure: surgery to remove right thigh mass first noticed in May 2018. Denies pain, numbness, tingling and weakness. No changes in skin color.    Had last Remicade June 12.       See problem list for active medical problems.  Problems all longstanding and stable, except as noted/documented.  See ROS for pertinent symptoms related to these conditions.                                                                                                                                                          .    MEDICAL HISTORY:     Patient Active Problem List    Diagnosis Date Noted     Mass of right thigh 05/08/2018     Priority: Medium     Crohn's disease of both small and large intestine without complication (H) 05/04/2018     Priority: Medium      Past Medical History:   Diagnosis Date     Crohn's disease (H)      GERD (gastroesophageal reflux disease)     Occasional symptoms, does not need to take medication regularly.       H/O Elevated liver enzymes     Patient was told it was due to her carb-heavy diet, and \"pre-diabetes\"     H/O tension headache      PONV (postoperative nausea and vomiting)      Prediabetes      Past Surgical History:   Procedure Laterality Date     COLONOSCOPY       ENDOSCOPY       EXCISE SOFT TISSUE TUMOR THIGH Right 5/31/2018    Procedure: EXCISE SOFT TISSUE TUMOR THIGH;  Biopsy Right Thigh Tumor;  Surgeon: Dylon Mendez MD;  Location: [facility] OR     Current Outpatient Prescriptions   Medication Sig Dispense Refill     AZATHIOPRINE PO Take by mouth daily        IBUPROFEN PO Take by mouth every 8 hours as needed for moderate pain       InFLIXimab (REMICADE IV) Every 6-8 " "weeks       VITAMIN D, CHOLECALCIFEROL, PO Take 2,000 Units by mouth daily       OTC products: no use of herbal medications or other supplements and NSAIDS    No Known Allergies   Latex Allergy: NO    Social History   Substance Use Topics     Smoking status: Never Smoker     Smokeless tobacco: Never Used     Alcohol use Yes      Comment: Rarely, maybe one drink once a month.       History   Drug Use No       REVIEW OF SYSTEMS:   CONSTITUTIONAL: NEGATIVE for fever, chills, change in weight  INTEGUMENTARY/SKIN: NEGATIVE for worrisome rashes, moles or lesions  EYES: NEGATIVE for vision changes or irritation  ENT/MOUTH: NEGATIVE for ear, mouth and throat problems  RESP: NEGATIVE for significant cough or SOB  BREAST: NEGATIVE for masses, tenderness or discharge  CV: NEGATIVE for chest pain, palpitations or peripheral edema  GI: NEGATIVE for nausea, abdominal pain, heartburn, or change in bowel habits  : NEGATIVE for frequency, dysuria, or hematuria  MUSCULOSKELETAL: NEGATIVE for significant arthralgias or myalgia  NEURO: NEGATIVE for weakness, dizziness or paresthesias  ENDOCRINE: NEGATIVE for temperature intolerance, skin/hair changes  HEME: NEGATIVE for bleeding problems  PSYCHIATRIC: NEGATIVE for changes in mood or affect    EXAM:   /86 (BP Location: Right arm, Patient Position: Chair, Cuff Size: Adult Large)  Pulse 78  Temp 98.2  F (36.8  C) (Oral)  Resp 20  Ht 5' 4\" (1.626 m)  Wt 212 lb (96.2 kg)  LMP 07/11/2018 (Approximate)  SpO2 94%  Breastfeeding? No  BMI 36.39 kg/m2    GENERAL APPEARANCE: healthy, alert and no distress     EYES: EOMI, PERRL     HENT: ear canals and TM's normal and nose and mouth without ulcers or lesions     NECK: no adenopathy, no asymmetry, masses, or scars and thyroid normal to palpation     RESP: lungs clear to auscultation - no rales, rhonchi or wheezes     CV: regular rates and rhythm, normal S1 S2, no S3 or S4 and no murmur, click or rub     ABDOMEN:  soft, nontender, no " HSM or masses and bowel sounds normal     MS: extremities normal- no gross deformities noted, no evidence of inflammation in joints, FROM in all extremities.     SKIN: no suspicious lesions or rashes     NEURO: Normal strength and tone, sensory exam grossly normal, mentation intact and speech normal     PSYCH: mentation appears normal. and affect normal/bright     LYMPHATICS: No cervical adenopathy    DIAGNOSTICS:   EKG: Sinus rhythm  RSR (V1) - possible incomplete RBBB and anterior fascicular block  Abnormal precordial QRS contours - non-diagonstic for this age    Hemoglobin:   Hemoglobin   Date Value Ref Range Status   07/18/2018 13.6 11.7 - 15.7 g/dL Final       HCG: Negative      Last Comprehensive Metabolic Panel:  Sodium   Date Value Ref Range Status   07/18/2018 140 133 - 144 mmol/L Final     Potassium   Date Value Ref Range Status   07/18/2018 3.4 3.4 - 5.3 mmol/L Final     Chloride   Date Value Ref Range Status   07/18/2018 107 94 - 109 mmol/L Final     Carbon Dioxide   Date Value Ref Range Status   07/18/2018 24 20 - 32 mmol/L Final     Anion Gap   Date Value Ref Range Status   07/18/2018 9 3 - 14 mmol/L Final     Glucose   Date Value Ref Range Status   07/18/2018 114 (H) 70 - 99 mg/dL Final     Urea Nitrogen   Date Value Ref Range Status   07/18/2018 13 7 - 30 mg/dL Final     Creatinine   Date Value Ref Range Status   07/18/2018 0.60 0.52 - 1.04 mg/dL Final     GFR Estimate   Date Value Ref Range Status   07/18/2018 >90 >60 mL/min/1.7m2 Final     Comment:     Non  GFR Calc     Calcium   Date Value Ref Range Status   07/18/2018 9.4 8.5 - 10.1 mg/dL Final     Bilirubin Total   Date Value Ref Range Status   07/18/2018 0.4 0.2 - 1.3 mg/dL Final     Alkaline Phosphatase   Date Value Ref Range Status   07/18/2018 68 40 - 150 U/L Final     ALT   Date Value Ref Range Status   07/18/2018 109 (H) 0 - 50 U/L Final     AST   Date Value Ref Range Status   07/18/2018 59 (H) 0 - 45 U/L Final            Recent Labs   Lab Test  05/29/18   1709   HGB  14.1   NA  139   POTASSIUM  3.5   CR  0.69        IMPRESSION:   Reason for surgery/procedure: surgery to removal right thigh mass    The proposed surgical procedure is considered INTERMEDIATE risk.    REVISED CARDIAC RISK INDEX  The patient has the following serious cardiovascular risks for perioperative complications such as (MI, PE, VFib and 3  AV Block):  No serious cardiac risks  INTERPRETATION: 0 risks: Class I (very low risk - 0.4% complication rate)    The patient has the following additional risks for perioperative complications:  No identified additional risks      ICD-10-CM    1. Preop general physical exam Z01.818 Beta HCG Qual, Urine - [facility] ([id number])     Hemoglobin     EKG 12-lead complete w/read - Clinics     Comprehensive metabolic panel     CANCELED: Basic metabolic panel  (Ca, Cl, CO2, Creat, Gluc, K, Na, BUN)   2. Mass of right thigh R22.41    3. Crohn's disease of both small and large intestine without complication (H) K50.80        RECOMMENDATIONS:     --Consult hospital rounder / IM to assist post-op medical management    --Patient is to take all scheduled medications on the day of surgery EXCEPT for modifications listed below.    Anticoagulant or Antiplatelet Medication Use  NSAIDS: Ibuprofen (Motrin):         Stop one day prior to surgery        APPROVAL GIVEN to proceed with proposed procedure, without further diagnostic evaluation       Signed Electronically by: HARESH Torres CNP    Copy of this evaluation report is provided to requesting physician.    [facility] Preop Guidelines    Revised Cardiac Risk Index

## 2018-07-18 NOTE — PATIENT INSTRUCTIONS
Before Your Surgery      Call your surgeon if there is any change in your health. This includes signs of a cold or flu (such as a sore throat, runny nose, cough, rash or fever).    Do not smoke, drink alcohol or take over the counter medicine (unless your surgeon or primary care doctor tells you to) for the 24 hours before and after surgery.    If you take prescribed drugs: Follow your doctor s orders about which medicines to take and which to stop until after surgery.    Eating and drinking prior to surgery: follow the instructions from your surgeon    Take a shower or bath the night before surgery. Use the soap your surgeon gave you to gently clean your skin. If you do not have soap from your surgeon, use your regular soap. Do not shave or scrub the surgery site.  Wear clean pajamas and have clean sheets on your bed.     At [facility], we strive to deliver an exceptional experience to you, every time we see you.  If you receive a survey in the mail, please send us back your thoughts. We really do value your feedback.    Based on your medical history, these are the current health maintenance/preventive care services that you are due for (some may have been done at this visit.)  Health Maintenance Due   Topic Date Due     PHQ-2 Q1 YR  07/15/1996     TETANUS IMMUNIZATION (SYSTEM ASSIGNED)  07/15/2002     HIV SCREEN (SYSTEM ASSIGNED)  07/15/2002     PAP SCREENING Q3 YR (SYSTEM ASSIGNED)  07/15/2005         Suggested websites for health information:  Www.[place].org : Up to date and easily searchable information on multiple topics.  Www.medlineplus.gov : medication info, interactive tutorials, watch real surgeries online  Www.familydoctor.org : good info from the Academy of Family Physicians  Www.cdc.gov : public health info, travel advisories, epidemics (H1N1)  Www.aap.org : children's health info, normal development, vaccinations  Www.health.[place].us : [place] dept of health, public health  issues in [place], N1N1    Your care team:                            Family Medicine Internal Medicine   MD Riccardo Jackson MD Shantel Branch-Fleming, MD Katya Georgiev PA-C Nam Ho, MD Pediatrics   MARIVEL Garnett, HAYDEN Puckett APRN MD Cheryl Purdy MD Deborah Mielke, MD Jami Bradley, APRN [facility]      Clinic hours: Monday - Thursday 7 am-7 pm; Fridays 7 am-5 pm.   Urgent care: Monday - Friday 11 am-9 pm; Saturday and Sunday 9 am-5 pm.  Pharmacy : Monday -Thursday 8 am-8 pm; Friday 8 am-6 pm; Saturday and Sunday 9 am-5 pm.     Clinic: [phone]   Pharmacy: [phone]

## 2018-07-23 NOTE — PROGRESS NOTES
Patient is having surgery at [facility], they are able to view our EPIC for all pre-op information.  Sandro Love,  For Teams Comfort and Heart

## 2018-07-25 ENCOUNTER — ANESTHESIA EVENT (OUTPATIENT)
Dept: SURGERY | Facility: AMBULATORY SURGERY CENTER | Age: 33
End: 2018-07-25

## 2018-07-26 ENCOUNTER — HOSPITAL ENCOUNTER (OUTPATIENT)
Facility: AMBULATORY SURGERY CENTER | Age: 33
End: 2018-07-26
Attending: ORTHOPAEDIC SURGERY
Payer: COMMERCIAL

## 2018-07-26 ENCOUNTER — ANESTHESIA (OUTPATIENT)
Dept: SURGERY | Facility: AMBULATORY SURGERY CENTER | Age: 33
End: 2018-07-26

## 2018-07-26 ENCOUNTER — SURGERY (OUTPATIENT)
Age: 33
End: 2018-07-26

## 2018-07-26 VITALS
OXYGEN SATURATION: 94 % | HEART RATE: 75 BPM | RESPIRATION RATE: 17 BRPM | DIASTOLIC BLOOD PRESSURE: 86 MMHG | WEIGHT: 211 LBS | HEIGHT: 64 IN | BODY MASS INDEX: 36.02 KG/M2 | TEMPERATURE: 97.2 F | SYSTOLIC BLOOD PRESSURE: 125 MMHG

## 2018-07-26 DIAGNOSIS — D36.7: Primary | ICD-10-CM

## 2018-07-26 LAB
HCG UR QL: NEGATIVE
INTERNAL QC OK POCT: YES

## 2018-07-26 RX ORDER — HYDROXYZINE HYDROCHLORIDE 25 MG/1
25 TABLET, FILM COATED ORAL EVERY 6 HOURS PRN
Qty: 30 TABLET | Refills: 0 | Status: SHIPPED | OUTPATIENT
Start: 2018-07-26 | End: 2018-08-10

## 2018-07-26 RX ORDER — LIDOCAINE HYDROCHLORIDE 20 MG/ML
INJECTION, SOLUTION INFILTRATION; PERINEURAL PRN
Status: DISCONTINUED | OUTPATIENT
Start: 2018-07-26 | End: 2018-07-26

## 2018-07-26 RX ORDER — FENTANYL CITRATE 50 UG/ML
25-50 INJECTION, SOLUTION INTRAMUSCULAR; INTRAVENOUS
Status: DISCONTINUED | OUTPATIENT
Start: 2018-07-26 | End: 2018-07-26 | Stop reason: HOSPADM

## 2018-07-26 RX ORDER — MEPERIDINE HYDROCHLORIDE 25 MG/ML
12.5 INJECTION INTRAMUSCULAR; INTRAVENOUS; SUBCUTANEOUS
Status: DISCONTINUED | OUTPATIENT
Start: 2018-07-26 | End: 2018-07-27 | Stop reason: HOSPADM

## 2018-07-26 RX ORDER — BUPIVACAINE HYDROCHLORIDE AND EPINEPHRINE 2.5; 5 MG/ML; UG/ML
INJECTION, SOLUTION INFILTRATION; PERINEURAL PRN
Status: DISCONTINUED | OUTPATIENT
Start: 2018-07-26 | End: 2018-07-26 | Stop reason: HOSPADM

## 2018-07-26 RX ORDER — LIDOCAINE 40 MG/G
CREAM TOPICAL
Status: DISCONTINUED | OUTPATIENT
Start: 2018-07-26 | End: 2018-07-26 | Stop reason: HOSPADM

## 2018-07-26 RX ORDER — ACETAMINOPHEN 325 MG/1
650 TABLET ORAL
Status: DISCONTINUED | OUTPATIENT
Start: 2018-07-26 | End: 2018-07-27 | Stop reason: HOSPADM

## 2018-07-26 RX ORDER — ONDANSETRON 4 MG/1
4 TABLET, ORALLY DISINTEGRATING ORAL EVERY 30 MIN PRN
Status: DISCONTINUED | OUTPATIENT
Start: 2018-07-26 | End: 2018-07-27 | Stop reason: HOSPADM

## 2018-07-26 RX ORDER — NALOXONE HYDROCHLORIDE 0.4 MG/ML
.1-.4 INJECTION, SOLUTION INTRAMUSCULAR; INTRAVENOUS; SUBCUTANEOUS
Status: DISCONTINUED | OUTPATIENT
Start: 2018-07-26 | End: 2018-07-27 | Stop reason: HOSPADM

## 2018-07-26 RX ORDER — PROPOFOL 10 MG/ML
INJECTION, EMULSION INTRAVENOUS PRN
Status: DISCONTINUED | OUTPATIENT
Start: 2018-07-26 | End: 2018-07-26

## 2018-07-26 RX ORDER — ACETAMINOPHEN 325 MG/1
975 TABLET ORAL ONCE
Status: COMPLETED | OUTPATIENT
Start: 2018-07-26 | End: 2018-07-26

## 2018-07-26 RX ORDER — ONDANSETRON 2 MG/ML
4 INJECTION INTRAMUSCULAR; INTRAVENOUS EVERY 30 MIN PRN
Status: DISCONTINUED | OUTPATIENT
Start: 2018-07-26 | End: 2018-07-27 | Stop reason: HOSPADM

## 2018-07-26 RX ORDER — ACETAMINOPHEN 325 MG/1
975 TABLET ORAL ONCE
Status: DISCONTINUED | OUTPATIENT
Start: 2018-07-26 | End: 2018-07-26 | Stop reason: HOSPADM

## 2018-07-26 RX ORDER — SODIUM CHLORIDE, SODIUM LACTATE, POTASSIUM CHLORIDE, CALCIUM CHLORIDE 600; 310; 30; 20 MG/100ML; MG/100ML; MG/100ML; MG/100ML
INJECTION, SOLUTION INTRAVENOUS CONTINUOUS
Status: DISCONTINUED | OUTPATIENT
Start: 2018-07-26 | End: 2018-07-27 | Stop reason: HOSPADM

## 2018-07-26 RX ORDER — FENTANYL CITRATE 50 UG/ML
INJECTION, SOLUTION INTRAMUSCULAR; INTRAVENOUS PRN
Status: DISCONTINUED | OUTPATIENT
Start: 2018-07-26 | End: 2018-07-26

## 2018-07-26 RX ORDER — OXYCODONE HYDROCHLORIDE 5 MG/1
5-10 TABLET ORAL EVERY 4 HOURS PRN
Qty: 30 TABLET | Refills: 0 | Status: SHIPPED | OUTPATIENT
Start: 2018-07-26 | End: 2018-08-10

## 2018-07-26 RX ORDER — HYDROMORPHONE HYDROCHLORIDE 1 MG/ML
.3-.5 INJECTION, SOLUTION INTRAMUSCULAR; INTRAVENOUS; SUBCUTANEOUS EVERY 10 MIN PRN
Status: DISCONTINUED | OUTPATIENT
Start: 2018-07-26 | End: 2018-07-27 | Stop reason: HOSPADM

## 2018-07-26 RX ORDER — LABETALOL HYDROCHLORIDE 5 MG/ML
10 INJECTION, SOLUTION INTRAVENOUS
Status: DISCONTINUED | OUTPATIENT
Start: 2018-07-26 | End: 2018-07-26 | Stop reason: HOSPADM

## 2018-07-26 RX ORDER — ONDANSETRON 2 MG/ML
INJECTION INTRAMUSCULAR; INTRAVENOUS PRN
Status: DISCONTINUED | OUTPATIENT
Start: 2018-07-26 | End: 2018-07-26

## 2018-07-26 RX ORDER — SODIUM CHLORIDE, SODIUM LACTATE, POTASSIUM CHLORIDE, CALCIUM CHLORIDE 600; 310; 30; 20 MG/100ML; MG/100ML; MG/100ML; MG/100ML
INJECTION, SOLUTION INTRAVENOUS CONTINUOUS
Status: DISCONTINUED | OUTPATIENT
Start: 2018-07-26 | End: 2018-07-26 | Stop reason: HOSPADM

## 2018-07-26 RX ORDER — DEXAMETHASONE SODIUM PHOSPHATE 4 MG/ML
INJECTION, SOLUTION INTRA-ARTICULAR; INTRALESIONAL; INTRAMUSCULAR; INTRAVENOUS; SOFT TISSUE PRN
Status: DISCONTINUED | OUTPATIENT
Start: 2018-07-26 | End: 2018-07-26

## 2018-07-26 RX ORDER — GLYCOPYRROLATE 0.2 MG/ML
INJECTION, SOLUTION INTRAMUSCULAR; INTRAVENOUS PRN
Status: DISCONTINUED | OUTPATIENT
Start: 2018-07-26 | End: 2018-07-26

## 2018-07-26 RX ORDER — OXYCODONE HYDROCHLORIDE 5 MG/1
5-10 TABLET ORAL EVERY 4 HOURS PRN
Status: COMPLETED | OUTPATIENT
Start: 2018-07-26 | End: 2018-07-26

## 2018-07-26 RX ORDER — GABAPENTIN 300 MG/1
300 CAPSULE ORAL ONCE
Status: DISCONTINUED | OUTPATIENT
Start: 2018-07-26 | End: 2018-07-26 | Stop reason: HOSPADM

## 2018-07-26 RX ORDER — FENTANYL CITRATE 50 UG/ML
25-50 INJECTION, SOLUTION INTRAMUSCULAR; INTRAVENOUS EVERY 5 MIN PRN
Status: DISCONTINUED | OUTPATIENT
Start: 2018-07-26 | End: 2018-07-26 | Stop reason: HOSPADM

## 2018-07-26 RX ORDER — PROPOFOL 10 MG/ML
INJECTION, EMULSION INTRAVENOUS CONTINUOUS PRN
Status: DISCONTINUED | OUTPATIENT
Start: 2018-07-26 | End: 2018-07-26

## 2018-07-26 RX ORDER — GABAPENTIN 300 MG/1
300 CAPSULE ORAL ONCE
Status: COMPLETED | OUTPATIENT
Start: 2018-07-26 | End: 2018-07-26

## 2018-07-26 RX ADMIN — ACETAMINOPHEN 975 MG: 325 TABLET ORAL at 07:46

## 2018-07-26 RX ADMIN — GLYCOPYRROLATE 0.2 MG: 0.2 INJECTION, SOLUTION INTRAMUSCULAR; INTRAVENOUS at 08:34

## 2018-07-26 RX ADMIN — BUPIVACAINE HYDROCHLORIDE AND EPINEPHRINE 20 ML: 2.5; 5 INJECTION, SOLUTION INFILTRATION; PERINEURAL at 09:14

## 2018-07-26 RX ADMIN — PROPOFOL 200 MCG/KG/MIN: 10 INJECTION, EMULSION INTRAVENOUS at 08:36

## 2018-07-26 RX ADMIN — Medication 0.25 MG: at 09:52

## 2018-07-26 RX ADMIN — Medication 0.25 MG: at 10:09

## 2018-07-26 RX ADMIN — LIDOCAINE HYDROCHLORIDE 100 MG: 20 INJECTION, SOLUTION INFILTRATION; PERINEURAL at 08:36

## 2018-07-26 RX ADMIN — FENTANYL CITRATE 50 MCG: 50 INJECTION, SOLUTION INTRAMUSCULAR; INTRAVENOUS at 08:34

## 2018-07-26 RX ADMIN — SODIUM CHLORIDE, SODIUM LACTATE, POTASSIUM CHLORIDE, CALCIUM CHLORIDE: 600; 310; 30; 20 INJECTION, SOLUTION INTRAVENOUS at 08:31

## 2018-07-26 RX ADMIN — SODIUM CHLORIDE, SODIUM LACTATE, POTASSIUM CHLORIDE, CALCIUM CHLORIDE: 600; 310; 30; 20 INJECTION, SOLUTION INTRAVENOUS at 07:48

## 2018-07-26 RX ADMIN — ONDANSETRON 4 MG: 2 INJECTION INTRAMUSCULAR; INTRAVENOUS at 10:00

## 2018-07-26 RX ADMIN — GABAPENTIN 300 MG: 300 CAPSULE ORAL at 07:47

## 2018-07-26 RX ADMIN — OXYCODONE HYDROCHLORIDE 5 MG: 5 TABLET ORAL at 11:23

## 2018-07-26 RX ADMIN — FENTANYL CITRATE 25 MCG: 50 INJECTION, SOLUTION INTRAMUSCULAR; INTRAVENOUS at 09:06

## 2018-07-26 RX ADMIN — PROPOFOL 200 MG: 10 INJECTION, EMULSION INTRAVENOUS at 08:37

## 2018-07-26 RX ADMIN — FENTANYL CITRATE 25 MCG: 50 INJECTION, SOLUTION INTRAMUSCULAR; INTRAVENOUS at 09:23

## 2018-07-26 RX ADMIN — FENTANYL CITRATE 25 MCG: 50 INJECTION, SOLUTION INTRAMUSCULAR; INTRAVENOUS at 11:04

## 2018-07-26 RX ADMIN — DEXAMETHASONE SODIUM PHOSPHATE 4 MG: 4 INJECTION, SOLUTION INTRA-ARTICULAR; INTRALESIONAL; INTRAMUSCULAR; INTRAVENOUS; SOFT TISSUE at 08:43

## 2018-07-26 ASSESSMENT — ENCOUNTER SYMPTOMS: ORTHOPNEA: 0

## 2018-07-26 ASSESSMENT — LIFESTYLE VARIABLES: TOBACCO_USE: 0

## 2018-07-26 NOTE — DISCHARGE INSTRUCTIONS
[facility]  Home Care Following Anesthesia  For 24 hours after surgery:  1. Get plenty of rest.  A responsible adult must stay with you for at least 24 hours after you leave the surgery center.  2. Do not drive or use heavy equipment.  If you have weakness or tingling, don't drive or use heavy equipment until this feeling goes away.   3. Do not drink alcohol.   4. Avoid strenuous or risky activities.  Ask for help when climbing stairs.  5. You may feel lightheaded.  IF so, sit for a few minutes before standing.  Have someone help you get up.   6. If you have nausea (feel sick to your stomach): Drink only clear liquids such as apple juice, ginger ale, broth or 7-Up.  Rest may also help.  Be sure to drink enough fluids.  Move to a regular diet as you feel able.   7. You may have a slight fever.  Call the doctor if your fever is over 100 F (37.7 C) (taken under the tongue) or lasts longer than 24 hours.  8. You may have a dry mouth, a sore throat, muscle aches or trouble sleeping. These should go away after 24 hours.  9. Do not make important or legal decisions.               Tips for taking pain medications  To get the best pain relief possible, remember these points:    Take pain medications as directed, before pain becomes severe.    Pain medication can upset your stomach: taking it with food may help.    Constipation is a common side effect of pain medication. Drink plenty of  fluids.    Eat foods high in fiber. Take a stool softener if recommended by your doctor or pharmacist.    Do not drink alcohol, drive or operate machinery while taking pain medications.    Ask about other ways to control pain, such as with heat, ice or relaxation.    Tylenol/Acetaminophen Consumption  To help encourage the safe use of acetaminophen, the makers of TYLENOL  have lowered the maximum daily dose for single-ingredient Extra Strength TYLENOL  (acetaminophen) products sold in the [place] from 8  pills per day (4,000 mg) to 6 pills per day (3,000 mg). The dosing interval has also changed from 2 pills every 4-6 hours to 2 pills every 6 hours.    If you feel your pain relief is insufficient, you may take Tylenol/Acetaminophen in addition to your narcotic pain medication.     Be careful not to exceed 3,000 mg of Tylenol/Acetaminophen in a 24 hour period from all sources.    If you are taking extra strength Tylenol/acetaminophen (500 mg), the maximum dose is 6 tablets in 24 hours.    If you are taking regular strength acetaminophen (325 mg), the maximum dose is 9 tablets in 24 hours.    Call a doctor for any of the followin. Signs of infection (fever, growing tenderness at the surgery site, a large amount of drainage or bleeding, severe pain, foul-smelling drainage, redness, swelling).  2. It has been over 8 to 10 hours since surgery and you are still not able to urinate (pass water).  3. Headache for over 24 hours.  4. Numbness, tingling or weakness the day after surgery (if you had spinal anesthesia).  Your doctor is:  Dr. Dylon Mendez, Orthopaedics: [phone]                    Or dial [phone] and ask for the resident on call for:  Orthopaedics  For emergency care, call the:  [facility] Emergency Department: [phone] (TTY for hearing impaired: [phone])      Safety Tips for Using Crutches    Crutch Fit:    Assume good standing posture with shoulders relaxed and crutch tips 6-8 inches out from the side of the foot.    The underarm pad should fall 2-3 fingers width below the armpit.    The handgrip is positioned level with the wrist to allow 30  flexion at the elbow.    Safety Tips:    Bear weight on your hands, not on your armpits.    Do not add extra padding to the underarm pad. This will, in effect, lengthen the crutches and increase risk of nerve injury.    Wear flat, properly fitting shoes. Do not walk in stocking feet, high heels or slippers.    Household hazards:  --Throw rugs should  "be removed from floors.  --Stairs should be cleared of obstacles.  --Use extra caution on slippery, highly polished, littered or uneven floor surfaces.  --Check for electric cords.    Check crutch tips for excessive wear and keep wing nuts tight.    While walking, look forward with  head up  and  eyes open.  Take equal length steps.    Use BOTH crutches.    Stairs Sequence:    UP: \"Good\" leg first, followed by  bad  leg, then crutches.    DOWN: Crutches, followed by  bad  leg, \"good\" leg.     Walking with Crutches:    Move both crutches forward at the same time.    Non-Weight Bearing (NWB):  Hold the involved leg up and swing through the crutches with the involved leg. The involved leg does not touch the floor.    Toe Touch Weight Bearing (TTWB): Move the involved leg forward. Rest it lightly on the floor for balance only. Step through the crutches with the uninvolved leg.    Partial Weight Bearing (PWB): Move the involved leg forward. Step down the weight of the leg only.  Step through the crutches with the uninvolved leg.    Weight Bearing As Tolerated (WBAT): Move the involved leg forward. Put as much pressure through the involved leg as you can tolerate comfortably. Then step through the crutches with the uninvolved leg.          "

## 2018-07-26 NOTE — IP AVS SNAPSHOT
[facility]    [address] [place] [id number]    Phone:  [phone]    Fax:  [phone]                                       After Visit Summary   7/26/2018    Marylou Mortensen    MRN: [id number]           After Visit Summary Signature Page     I have received my discharge instructions, and my questions have been answered. I have discussed any challenges I see with this plan with the nurse or doctor.    ..........................................................................................................................................  Patient/Patient Representative Signature      ..........................................................................................................................................  Patient Representative Print Name and Relationship to Patient    ..................................................               ................................................  Date                                            Time    ..........................................................................................................................................  Reviewed by Signature/Title    ...................................................              ..............................................  Date                                                            Time

## 2018-07-26 NOTE — IP AVS SNAPSHOT
MRN:[id number]                      After Visit Summary   7/26/2018    Marylou Mortensen    MRN: [id number]           Thank you!     Thank you for choosing [facility] for your care. Our goal is always to provide you with excellent care. Hearing back from our patients is one way we can continue to improve our services. Please take a few minutes to complete the written survey that you may receive in the mail after you visit with us. Thank you!        Patient Information     Date Of Birth          1985        About your hospital stay     You were admitted on:  July 26, 2018 [facility]    You were discharged on:  July 26, 2018       Who to Call     For medical emergencies, please call 911.  For non-urgent questions about your medical care, please call your primary care provider or clinic, [phone]  For questions related to your surgery, please call your surgery clinic        Attending Provider     Provider Dylon Nixon MD Orthopedics       Primary Care Provider Office Phone # Fax #    Cqbrifg Issa Snell [phone]      After Care Instructions      Diet as Tolerated       Return to diet before surgery, unless instructed otherwise.            Discharge Instructions       Review outpatient procedure discharge instructions with patient as directed by Provider            Dressing Change        You should remove your dressing in 6-7 days post op; afterwards you may leave open to air or cover it for protection with dry gauze to keep it clean            Ice to affected area       Ice pack to surgical site every 15 minutes per hour for 24 hours            Notify Provider       For signs and symptoms of infection: Fever greater than 101, redness, swelling, heat at site, drainage, pus.    CALL YOUR PHYSICIAN IF:  1.  Your pain begins to worsen and is unable to be controlled with your medications.  2.   Excessive redness or drainage of cloudy or bloody material from the wounds (Clear red tinted fluid and some mild drainage should be expected). Drainage of any kind 5 days after surgery should be reported to the doctor.  3.  You have a temperature elevation greater than 101.5    4.  You have pain, swelling or redness in your calf. You have numbness or weakness in your leg or foot.            Return to clinic       Return to clinic in 2 weeks            Shower        Your aquacel gel dressing can withstand water but it is not 100% water-proof; let water run over it and dab dry. Do NOT submerge in tub or pool            Weight bearing - As tolerated           Wound care       Instructions to care for your wound at home:   -Your dressing is to remain in place until post operative day 7 after which you may remove  -There are white steri-strips in place that will fall off on their own about 7-10 days after your dressing is removed (or 10-14 days post-operatively).   -Your stitches will dissolve on their own and do not need to be removed.    -You may shower once your dressing is off; let water run over the incision and dab dry -Do NOT submerge in pool or tub for at least 2 weeks.   -After the dressing is off, you may leave it open to the air.   -Please contact us if there is any increasing drainage, swelling, pain, or redness stemming from your incision; this may be a sign of infection and would need to be looked at immediately.                  Further instructions from your care team       [facility]  Home Care Following Anesthesia  For 24 hours after surgery:  1. Get plenty of rest.  A responsible adult must stay with you for at least 24 hours after you leave the surgery center.  2. Do not drive or use heavy equipment.  If you have weakness or tingling, don't drive or use heavy equipment until this feeling goes away.   3. Do not drink alcohol.   4. Avoid strenuous or risky  activities.  Ask for help when climbing stairs.  5. You may feel lightheaded.  IF so, sit for a few minutes before standing.  Have someone help you get up.   6. If you have nausea (feel sick to your stomach): Drink only clear liquids such as apple juice, ginger ale, broth or 7-Up.  Rest may also help.  Be sure to drink enough fluids.  Move to a regular diet as you feel able.   7. You may have a slight fever.  Call the doctor if your fever is over 100 F (37.7 C) (taken under the tongue) or lasts longer than 24 hours.  8. You may have a dry mouth, a sore throat, muscle aches or trouble sleeping. These should go away after 24 hours.  9. Do not make important or legal decisions.               Tips for taking pain medications  To get the best pain relief possible, remember these points:    Take pain medications as directed, before pain becomes severe.    Pain medication can upset your stomach: taking it with food may help.    Constipation is a common side effect of pain medication. Drink plenty of  fluids.    Eat foods high in fiber. Take a stool softener if recommended by your doctor or pharmacist.    Do not drink alcohol, drive or operate machinery while taking pain medications.    Ask about other ways to control pain, such as with heat, ice or relaxation.    Tylenol/Acetaminophen Consumption  To help encourage the safe use of acetaminophen, the makers of TYLENOL  have lowered the maximum daily dose for single-ingredient Extra Strength TYLENOL  (acetaminophen) products sold in the [place] from 8 pills per day (4,000 mg) to 6 pills per day (3,000 mg). The dosing interval has also changed from 2 pills every 4-6 hours to 2 pills every 6 hours.    If you feel your pain relief is insufficient, you may take Tylenol/Acetaminophen in addition to your narcotic pain medication.     Be careful not to exceed 3,000 mg of Tylenol/Acetaminophen in a 24 hour period from all sources.    If you are taking extra strength  "Tylenol/acetaminophen (500 mg), the maximum dose is 6 tablets in 24 hours.    If you are taking regular strength acetaminophen (325 mg), the maximum dose is 9 tablets in 24 hours.    Call a doctor for any of the followin. Signs of infection (fever, growing tenderness at the surgery site, a large amount of drainage or bleeding, severe pain, foul-smelling drainage, redness, swelling).  2. It has been over 8 to 10 hours since surgery and you are still not able to urinate (pass water).  3. Headache for over 24 hours.  4. Numbness, tingling or weakness the day after surgery (if you had spinal anesthesia).  Your doctor is:  Dr. Dylon Mendez, Orthopaedics: [phone]                    Or dial [phone] and ask for the resident on call for:  Orthopaedics  For emergency care, call the:  [facility] Emergency Department: [phone] (TTY for hearing impaired: [phone])      Safety Tips for Using Crutches    Crutch Fit:    Assume good standing posture with shoulders relaxed and crutch tips 6-8 inches out from the side of the foot.    The underarm pad should fall 2-3 fingers width below the armpit.    The handgrip is positioned level with the wrist to allow 30  flexion at the elbow.    Safety Tips:    Bear weight on your hands, not on your armpits.    Do not add extra padding to the underarm pad. This will, in effect, lengthen the crutches and increase risk of nerve injury.    Wear flat, properly fitting shoes. Do not walk in stocking feet, high heels or slippers.    Household hazards:  --Throw rugs should be removed from floors.  --Stairs should be cleared of obstacles.  --Use extra caution on slippery, highly polished, littered or uneven floor surfaces.  --Check for electric cords.    Check crutch tips for excessive wear and keep wing nuts tight.    While walking, look forward with  head up  and  eyes open.  Take equal length steps.    Use BOTH crutches.    Stairs Sequence:    UP: \"Good\" leg first, followed by " " bad  leg, then crutches.    DOWN: Crutches, followed by  bad  leg, \"good\" leg.     Walking with Crutches:    Move both crutches forward at the same time.    Non-Weight Bearing (NWB):  Hold the involved leg up and swing through the crutches with the involved leg. The involved leg does not touch the floor.    Toe Touch Weight Bearing (TTWB): Move the involved leg forward. Rest it lightly on the floor for balance only. Step through the crutches with the uninvolved leg.    Partial Weight Bearing (PWB): Move the involved leg forward. Step down the weight of the leg only.  Step through the crutches with the uninvolved leg.    Weight Bearing As Tolerated (WBAT): Move the involved leg forward. Put as much pressure through the involved leg as you can tolerate comfortably. Then step through the crutches with the uninvolved leg.            Pending Results     Date and Time Order Name Status Description    7/26/2018 0943 Surgical pathology exam In process             Admission Information     Date & Time Provider Department Dept. Phone    7/26/2018 Dylon Mendez MD [facility] [phone]      Your Vitals Were     Blood Pressure Pulse Temperature Respirations Height Weight    124/76 89 97.2  F (36.2  C) (Temporal) 18 1.626 m (5' 4\") 95.7 kg (211 lb)    Last Period Pulse Oximetry BMI (Body Mass Index)             07/11/2018 (Approximate) 94% 36.22 kg/m2         [email or web address] Information     [email or web address] gives you secure access to your electronic health record. If you see a primary care provider, you can also send messages to your care team and make appointments. If you have questions, please call your primary care clinic.  If you do not have a primary care provider, please call [phone] and they will assist you.      [email or web address] is an electronic gateway that provides easy, online access to your medical records. With [email or web address], you can request a clinic appointment, read your test results, renew a " prescription or communicate with your care team.     To access your existing account, please contact your [facility] or call [phone] for assistance.        Care EveryWhere ID     This is your Care EveryWhere ID. This could be used by other organizations to access your [facility] medical records  [id number]        Equal Access to Services     CHRISTINA RODARTE : Kash coxo Sonatanali, waaxda luqadaha, qaybta kaalmada ra, aurora mayo. So [facility] [phone].    ATENCIÓN: Si habla español, tiene a fernandez disposición servicios gratuitos de asistencia lingüística. Llame al [phone].    We comply with applicable federal civil rights laws and [place] laws. We do not discriminate on the basis of race, color, national origin, age, disability, sex, sexual orientation, or gender identity.               Review of your medicines      START taking        Dose / Directions    hydrOXYzine 25 MG tablet   Commonly known as:  ATARAX   Used for:  Benign neoplasm of thigh        Dose:  25 mg   Take 1 tablet (25 mg) by mouth every 6 hours as needed (for pain adjunct, itching, nausea)   Quantity:  30 tablet   Refills:  0       oxyCODONE IR 5 MG tablet   Commonly known as:  ROXICODONE   Used for:  Benign neoplasm of thigh        Dose:  5-10 mg   Take 1-2 tablets (5-10 mg) by mouth every 4 hours as needed for pain, moderate to severe pain or other (Moderate to Severe)   Quantity:  30 tablet   Refills:  0         CONTINUE these medicines which have NOT CHANGED        Dose / Directions    AZATHIOPRINE PO        Take by mouth daily   Refills:  0       IBUPROFEN PO        Take by mouth every 8 hours as needed for moderate pain   Refills:  0       REMICADE IV        Every 6-8 weeks   Refills:  0       VITAMIN D (CHOLECALCIFEROL) PO        Dose:  2000 Units   Take 2,000 Units by mouth daily   Refills:  0            Where to get your medicines      These medications were  sent to [place], [place] - [address]  [address], [facility] [id number]    Hours:  [facility] PHONE NUMBER [phone] Phone:  [phone]     hydrOXYzine 25 MG tablet         Some of these will need a paper prescription and others can be bought over the counter. Ask your nurse if you have questions.     Bring a paper prescription for each of these medications     oxyCODONE IR 5 MG tablet                Protect others around you: Learn how to safely use, store and throw away your medicines at www.disposemymeds.org.        Information about OPIOIDS     PRESCRIPTION OPIOIDS: WHAT YOU NEED TO KNOW   We gave you an opioid (narcotic) pain medicine. It is important to manage your pain, but opioids are not always the best choice. You should first try all the other options your care team gave you. Take this medicine for as short a time (and as few doses) as possible.     These medicines have risks:    DO NOT drive when on new or higher doses of pain medicine. These medicines can affect your alertness and reaction times, and you could be arrested for driving under the influence (DUI). If you need to use opioids long-term, talk to your care team about driving.    DO NOT operate heave machinery    DO NOT do any other dangerous activities while taking these medicines.     DO NOT drink any alcohol while taking these medicines.      If the opioid prescribed includes acetaminophen, DO NOT take with any other medicines that contain acetaminophen. Read all labels carefully. Look for the word  acetaminophen  or  Tylenol.  Ask your pharmacist if you have questions or are unsure.    You can get addicted to pain medicines, especially if you have a history of addiction (chemical, alcohol or substance dependence). Talk to your care team about ways to reduce this risk.    Store your pills in a secure place, locked if possible. We will not replace any lost or stolen  medicine. If you don t finish your medicine, please throw away (dispose) as directed by your pharmacist. The [place] [employer]ution [employer] has more information about safe disposal: https://www.pca.[facility].[place].us/living-green/managing-unwanted-medications.     All opioids tend to cause constipation. Drink plenty of water and eat foods that have a lot of fiber, such as fruits, vegetables, prune juice, apple juice and high-fiber cereal. Take a laxative (Miralax, milk of magnesia, Colace, Senna) if you don t move your bowels at least every other day.              Medication List: This is a list of all your medications and when to take them. Check marks below indicate your daily home schedule. Keep this list as a reference.      Medications           Morning Afternoon Evening Bedtime As Needed    AZATHIOPRINE PO   Take by mouth daily                                hydrOXYzine 25 MG tablet   Commonly known as:  ATARAX   Take 1 tablet (25 mg) by mouth every 6 hours as needed (for pain adjunct, itching, nausea)                                IBUPROFEN PO   Take by mouth every 8 hours as needed for moderate pain                                oxyCODONE IR 5 MG tablet   Commonly known as:  ROXICODONE   Take 1-2 tablets (5-10 mg) by mouth every 4 hours as needed for pain, moderate to severe pain or other (Moderate to Severe)   Last time this was given:  5 mg on 7/26/2018 11:23 AM                                REMICADE IV   Every 6-8 weeks                                VITAMIN D (CHOLECALCIFEROL) PO   Take 2,000 Units by mouth daily

## 2018-07-26 NOTE — ANESTHESIA PREPROCEDURE EVALUATION
"  Anesthesia Evaluation     . Pt has had prior anesthetic. Type: MAC    History of anesthetic complications   - PONV and motion sickness        ROS/MED HX    ENT/Pulmonary:  - neg pulmonary ROS    (-) tobacco use and recent URI   Neurologic:  - neg neurologic ROS     Cardiovascular:  - neg cardiovascular ROS      (-) taking anticoagulants/antiplatelets, FISHER and orthopnea/PND   METS/Exercise Tolerance:  4 - Raking leaves, gardening   Hematologic:  - neg hematologic  ROS       Musculoskeletal:  - neg musculoskeletal ROS       GI/Hepatic: Comment: GERD symptoms associated with Crohn's disease.  Takes OTC medications, as needed, not every day.      (+) GERD Asymptomatic on medication, Inflammatory bowel disease, Other GI/Hepatic elevated LFTs      Renal/Genitourinary:  - ROS Renal section negative       Endo: Comment: \"Pre-diabetes\"    (+) Obesity, .      Psychiatric:  - neg psychiatric ROS       Infectious Disease:  - neg infectious disease ROS       Malignancy:      - no malignancy   Other:    - neg other ROS                 Physical Exam  Normal systems: cardiovascular, pulmonary and dental    Airway   Mallampati: I  TM distance: >3 FB  Neck ROM: full    Dental     Cardiovascular   Rhythm and rate: regular and normal  (-) no peripheral edema and no murmur    Pulmonary    breath sounds clear to auscultation    Other findings: 5/29/18: Hgb: 14.1  5/29/18: Na: 139; K: 3.5; Cl: 107; Glu: 79; BUN: 13; Cr: 0.69; Ca: 9.6; Bili total: 0.5; Albumin: 4.1; Alk phos: 65; ALT: 97; AST: 64               PAC Discussion and Assessment    ASA Classification: 2  Case is suitable for: ASC  Anesthetic techniques and relevant risks discussed: GA and GA with regional block for post-op pain control  Invasive monitoring and risk discussed: No  Types:   Possibility and Risk of blood transfusion discussed: No  NPO instructions given:   Additional anesthetic preparation and risks discussed:   Needs early admission to pre-op area:   Other: "     PAC Resident/NP Anesthesia Assessment:  Marylou Mortensen is a 32 year old female scheduled to undergo Biopsy Right Thigh Tumor on 5/31/18 with Dr. Dylon Mendez.    She has the following specific operative considerations:   1.  HIGH risk of postoperative nausea/vomiting with motion sickness: Recommend use of antiemetic agents in the perioperative period.    2.  Occasional GERD symptoms: Consider use of RSI techniques with advanced airway maneuvers.  3.  Obesity: Recommend careful positioning to prevent airway/ventilatory compromise, or tissue injury.    4.  Hgb, CMP today.    ADDENDUM:  Elevated ALT of 97 and AST of 64 noted on today's labs.  Recent lab results are not available.  Patient reports a recent history of elevated LFTs, being followed by Dr. Todd with [facility].  Called patient and advised her of the lab results.  Will forward them to Dr. Todd with [facility].  Do not anticipate that this will change the plan of care.    Revised Cardiac Risk Index: 0.4% risk of major adverse cardiac event.  VTE risk: 0.26%  ANDREA risk: Low  PONV risk score= 4.  (If > 2, anti-emetic intervention is recommended.)  Anesthesia considerations: Refer to PAC assessment in the anesthesia records.      Reviewed and Signed by PAC Mid-Level Provider/Resident  Mid-Level Provider/Resident: Kathryn Rojas CNP  Date: 5/29/18  Time:     Attending Anesthesiologist Anesthesia Assessment:  32 year old for biopsy of right thigh tumor. Patient has no significant cardiac or pulmonary disease. As noted, mild elevation of liver enzymes noted today, would not need to delay surgery for this mild elevation.     Patient/case discussed with IRAM. No need to see patient. Patient is appropriate for the planned procedure without further work-up or medical management.      Reviewed and Signed by PAC Anesthesiologist  Anesthesiologist: rachelle  Date: 5/30/2017  Time:   Pass/Fail: Pass  Disposition:     PAC Pharmacist  Assessment:        Pharmacist:   Date:   Time:      Anesthesia Plan      History & Physical Review  History and physical reviewed and following examination; no interval change.    ASA Status:  2 .    NPO Status:  > 8 hours    Plan for General and LMA with Intravenous and Propofol induction. Maintenance will be TIVA.    PONV prophylaxis:  Ondansetron (or other 5HT-3) and Dexamethasone or Solumedrol  I have examined the patient and reviewed the medical record  Plan:  GA with LMA, routine monitors    Silvio Villar MD      Postoperative Care  Postoperative pain management:  Multi-modal analgesia.      Consents  Anesthetic plan, risks, benefits and alternatives discussed with:  Patient.  Use of blood products discussed: No .   .                          .

## 2018-07-26 NOTE — ANESTHESIA CARE TRANSFER NOTE
Patient: Marylou Mortensen    Procedure(s):  Right Thigh Tumor Removal  - Wound Class: I-Clean    Diagnosis: Lipoma   Diagnosis Additional Information: No value filed.    Anesthesia Type:   General, LMA     Note:  Airway :Face Mask  Patient transferred to:PACU  Comments: Arrive PACU, Stable, Airway Intact  117/72, 92,14,93%,98.6  All questions answered.  Handoff Report: Identifed the Patient, Identified the Reponsible Provider, Reviewed the pertinent medical history, Discussed the surgical course, Reviewed Intra-OP anesthesia mangement and issues during anesthesia, Set expectations for post-procedure period and Allowed opportunity for questions and acknowledgement of understanding      Vitals: (Last set prior to Anesthesia Care Transfer)    CRNA VITALS  7/26/2018 1004 - 7/26/2018 1037      7/26/2018             Pulse: 91    SpO2: (!)  81 %    Resp Rate (observed): 11    Resp Rate (set): 10                Electronically Signed By: HARESH Salazar CRNA  July 26, 2018  10:37 AM

## 2018-07-26 NOTE — ANESTHESIA POSTPROCEDURE EVALUATION
Patient: Marylou Mortensen    Procedure(s):  Right Thigh Tumor Removal  - Wound Class: I-Clean    Diagnosis:Lipoma   Diagnosis Additional Information: No value filed.    Anesthesia Type:  General, LMA    Note:  Anesthesia Post Evaluation    Patient location during evaluation: PACU  Patient participation: Able to fully participate in evaluation  Level of consciousness: awake and alert  Pain management: adequate  Airway patency: patent  Cardiovascular status: acceptable  Respiratory status: acceptable  Hydration status: acceptable  PONV: none             Last vitals:  Vitals:    07/26/18 0722 07/26/18 1037   BP: 125/87 117/72   Resp: 18 (!) 38   Temp: 36.8  C (98.3  F) 37  C (98.6  F)   SpO2: 96% 92%         Electronically Signed By: Silvio Villar MD  July 26, 2018  10:44 AM

## 2018-07-26 NOTE — OP NOTE
Preop diagnosis: Benign tumor right medial thigh    Postoperative diagnosis: Same    Procedure performed: Removal of right medial thigh tumor, benign, deep, 15 cm    Surgeon: Idris Mendez and Ash esposito    Pathology submitted: Tumor right medial thigh fresh for final    Estimated blood loss: 50 cc    Patient was interviewed in the preoperative area risk and benefits were again reviewed the surgical consent was signed.  The site of surgery was marked with my initials and line of intended incision.  Preoperative brief had been performed.  The patient was taken the operating room received a general anesthetic in the supine position the right thigh was prepped and draped sterilely as was the entire right lower extremity.    Surgical timeout was performed.  Performance incision was made over the medial thigh.  Dissection was taken carefully down through skin subcutaneous tissue and superficial fascia.  The gracilis muscle was retracted anteriorly.  Various layers of the abductor muscles were penetrated.  Vessels were identified and ligated as we dissected through the abductor compartment.  Tumor was identified.  Blunt dissection was used after incision excising the capsule to manually free the tumor from the deep structures.  To effectively deliver the tumor and the incision was extended proximally an additional 2 inches.  It was noted that there was a posterior element to the tumor.  It was thought that this could not be extracted as it was fixed deep in the thigh.  It had been discussed preoperatively with the patient that this may be left in place and evaluated different point in time.  With this understanding we then cut across the tumor and the deepest portion of the thigh.  Tumor was lifted from the wound and irrigated.  The wound was closed in standard fashion.  Postoperative D brief was performed.    Postoperative plan: 1 return to clinic in 2-3 weeks for wound inspection and discussion of  histopathology  2.  Weightbearing as tolerated will likely need crutches initially.

## 2018-07-26 NOTE — BRIEF OP NOTE
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  Implants: None    WBAT  Crutches as needed if painful  Up with assist until safe; then ad frank  Aquacel until POD 7 - steristrips and running PDS in place  RTC 2 weeks for wound check  Pain control: OK for toradol, ibuprofen, tylenol, oxycodone PRN    Ash Peguero MD 07/26/2018  Orthopaedic Surgery Resident, PGY-4  Pager: [phone]

## 2018-08-09 LAB — COPATH REPORT: NORMAL

## 2018-08-10 ENCOUNTER — OFFICE VISIT (OUTPATIENT)
Dept: ORTHOPEDICS | Facility: CLINIC | Age: 33
End: 2018-08-10
Payer: COMMERCIAL

## 2018-08-10 DIAGNOSIS — D17.9 INTRAMUSCULAR LIPOMA: Primary | ICD-10-CM

## 2018-08-10 NOTE — MR AVS SNAPSHOT
After Visit Summary   8/10/2018    Marylou Mortensen    MRN: [id number]           Patient Information     Date Of Birth          1985        Visit Information        Provider Department      8/10/2018 12:45 PM Dylon Mendez MD [facility]        Today's Diagnoses     Intramuscular lipoma    -  1       Follow-ups after your visit        Who to contact     Please call your clinic at [phone] to:    Ask questions about your health    Make or cancel appointments    Discuss your medicines    Learn about your test results    Speak to your doctor            Additional Information About Your Visit        MyChart Information     [email or web address] gives you secure access to your electronic health record. If you see a primary care provider, you can also send messages to your care team and make appointments. If you have questions, please call your primary care clinic.  If you do not have a primary care provider, please call [phone] and they will assist you.      [email or web address] is an electronic gateway that provides easy, online access to your medical records. With [email or web address], you can request a clinic appointment, read your test results, renew a prescription or communicate with your care team.     To access your existing account, please contact your [facility] or call [phone] for assistance.        Care EveryWhere ID     This is your Care EveryWhere ID. This could be used by other organizations to access your [place] medical records  [id number]        Your Vitals Were     Last Period                   07/11/2018 (Approximate)            Blood Pressure from Last 3 Encounters:   07/26/18 125/86   07/18/18 129/86   05/31/18 121/77    Weight from Last 3 Encounters:   07/26/18 95.7 kg (211 lb)   07/18/18 96.2 kg (212 lb)   05/29/18 95 kg (209 lb 6.4 oz)              Today, you had the following     No orders found for display         Today's Medication Changes           These changes are accurate as of 8/10/18  1:30 PM.  If you have any questions, ask your nurse or doctor.               Stop taking these medicines if you haven't already. Please contact your care team if you have questions.     hydrOXYzine 25 MG tablet   Commonly known as:  ATARAX   Stopped by:  Dylon Mendez MD           oxyCODONE IR 5 MG tablet   Commonly known as:  ROXICODONE   Stopped by:  Dylon Mendez MD                    Primary Care Provider Office Phone # Fax #    Chen Valiente, HARESH [phone]       [address]  [place] [facility] [address]        Equal Access to Services     [facility]: Hadii bharti berman Sotoan, waaxda luqadaha, qaybta kaalmada adejose davidyada, aurora joya . So [facility] [phone].    ATENCIÓN: Si habla español, tiene a fernandez disposición servicios gratuitos de asistencia lingüística. Llame al [phone].    We comply with applicable federal civil rights laws and [place] laws. We do not discriminate on the basis of race, color, national origin, age, disability, sex, sexual orientation, or gender identity.            Thank you!     Thank you for choosing [facility]  for your care. Our goal is always to provide you with excellent care. Hearing back from our patients is one way we can continue to improve our services. Please take a few minutes to complete the written survey that you may receive in the mail after your visit with us. Thank you!             Your Updated Medication List - Protect others around you: Learn how to safely use, store and throw away your medicines at www.disposemymeds.org.          This list is accurate as of 8/10/18  1:30 PM.  Always use your most recent med list.                   Brand Name Dispense Instructions for use Diagnosis    AZATHIOPRINE PO      Take by mouth daily        IBUPROFEN PO      Take by mouth every 8 hours as needed for moderate pain        REMICADE IV       Every 6-8 weeks        VITAMIN D (CHOLECALCIFEROL) PO      Take 2,000 Units by mouth daily

## 2018-08-10 NOTE — PROGRESS NOTES
Date of surgery: 7/26/2018  Procedure: Excision soft tissue mass of her right thigh  FINAL DIAGNOSIS:   Soft tissue, right thigh tumor, excision:   - Atypical spindle cell lipomatous tumor   - Tumor size: 17.1 cm   - Margins: Tumor is present at the inked outer surface     33-year-old female patient now 2 weeks status post removal of tumor from her right thigh with Dr. Mendez on 7/26/2018.  She is doing quite well and no real acute issues.  She has been taking care of her incision and diligent fashion and has been healing quite well.  She removed details of her suture on her own.  She has been covering the incision when she moves but due to the swelling postoperatively she does have irritation to that region as her thighs, to contact when she walks.  She is also noticing that her skin is quite sensitive to adhesives.  Otherwise she is returned to work without complication.  She is no longer taking any pain medication.  She has no acute questions or concerns    Exam  Brief exam of the right lower extremity incision site demonstrates some fullness to the medial thigh compartment overlying her adductor's.  The skin otherwise is clean and intact.  Her incision is well approximated and healing with some minor eschar.  There is no dehiscence or erythema.  There is no drainage.  She is able to getting standing position and ambulate without difficulty.    Imaging  No new imaging to review    Plan  Continue to monitor healing of your incision  No restrictions in activity or weightbearing  Return to clinic in 4 months with repeat MRI of the right lower extremity    Patient was seen and discussed with Dr. Andrea Peguero MD 08/10/2018  Orthopaedic Surgery Resident, PGY-4  Pager: [phone]

## 2018-08-10 NOTE — LETTER
8/10/2018       RE: Marylou Mortensen  [address]  [place] [address]     Dear Colleague,    Thank you for referring your patient, Marylou Mortensen, to the [facility]. Please see a copy of my visit note below.    Date of surgery: 7/26/2018  Procedure: Excision soft tissue mass of her right thigh  FINAL DIAGNOSIS:   Soft tissue, right thigh tumor, excision:   - Atypical spindle cell lipomatous tumor   - Tumor size: 17.1 cm   - Margins: Tumor is present at the inked outer surface     33-year-old female patient now 2 weeks status post removal of tumor from her right thigh with Dr. Mendez on 7/26/2018.  She is doing quite well and no real acute issues.  She has been taking care of her incision and diligent fashion and has been healing quite well.  She removed details of her suture on her own.  She has been covering the incision when she moves but due to the swelling postoperatively she does have irritation to that region as her thighs, to contact when she walks.  She is also noticing that her skin is quite sensitive to adhesives.  Otherwise she is returned to work without complication.  She is no longer taking any pain medication.  She has no acute questions or concerns    Exam  Brief exam of the right lower extremity incision site demonstrates some fullness to the medial thigh compartment overlying her adductor's.  The skin otherwise is clean and intact.  Her incision is well approximated and healing with some minor eschar.  There is no dehiscence or erythema.  There is no drainage.  She is able to getting standing position and ambulate without difficulty.    Imaging  No new imaging to review    Plan  Continue to monitor healing of your incision  No restrictions in activity or weightbearing  Return to clinic in 4 months with repeat MRI of the right lower extremity    Patient was seen and discussed with Dr. Andrea Aleman  MD Marielena 08/10/2018  Orthopaedic Surgery Resident, PGY-4  Pager: [phone]      Diagnosis: Spindle cell lipoma right thigh    Treatment: Surgical excision August 2018.    Marylou seen back today for wound inspection and discussion of histopathology.  His pathology shows atypical spindle cell lipoma.  This was reviewed with her and is the same as her initial biopsy.    Her wound is healed though she does appear to have a postoperative seroma.  I reassured her this should resolve over 2-3 months.    Impression: Overall doing quite well.  Intraoperatively was our impression that we left a small piece of tumor that was in the posterior compartment.  The patient is aware of this.    Plan: We will see her back in 4 months for an MRI scan of the right buttock and right thigh has a baseline post resection image and gain more detail to the right determine if there is residual tumor in the posterior compartment.    Again, thank you for allowing me to participate in the care of your patient.      Sincerely,    Dylon Mendez MD

## 2018-08-10 NOTE — PROGRESS NOTES
Diagnosis: Spindle cell lipoma right thigh    Treatment: Surgical excision August 2018.    Marylou seen back today for wound inspection and discussion of histopathology.  His pathology shows atypical spindle cell lipoma.  This was reviewed with her and is the same as her initial biopsy.    Her wound is healed though she does appear to have a postoperative seroma.  I reassured her this should resolve over 2-3 months.    Impression: Overall doing quite well.  Intraoperatively was our impression that we left a small piece of tumor that was in the posterior compartment.  The patient is aware of this.    Plan: We will see her back in 4 months for an MRI scan of the right buttock and right thigh has a baseline post resection image and gain more detail to the right determine if there is residual tumor in the posterior compartment.

## 2018-08-10 NOTE — NURSING NOTE
Chief Complaint   Patient presents with     Surgical Followup     2wk POP Wound Check, Right Thigh Tumor Removal DOS: 7/26/2018     RECHECK     Pt. states that she barley used the crutches POP and Returned back to work as a  yesterday (8/9/2018).        33 year old  1985                [id number] - [place], [place] - [address]    No Known Allergies  Current Outpatient Prescriptions   Medication     AZATHIOPRINE PO     IBUPROFEN PO     InFLIXimab (REMICADE IV)     VITAMIN D, CHOLECALCIFEROL, PO     No current facility-administered medications for this visit.

## 2018-08-23 LAB — COPATH REPORT: NORMAL

## 2018-10-23 ENCOUNTER — TELEPHONE (OUTPATIENT)
Dept: FAMILY MEDICINE | Facility: CLINIC | Age: 33
End: 2018-10-23

## 2018-10-23 NOTE — TELEPHONE ENCOUNTER
Panel Management Review      BP Readings from Last 1 Encounters:   07/26/18 125/86      Last Office Visit with this department: 7/18/2018    Fail List measure: PAP      Patient is due/failing the following:   PAP    Action needed:   Patient needs office visit for PHYSICAL.    Type of outreach:    Sent letter.    Questions for provider review:    None                                                                                                                                    Cooper Miranda MA      Chart routed to  .

## 2018-10-23 NOTE — LETTER
October 23, 2018    Marylou Mortensen  [address]   [place] [address]          Dear Marylou Mortensen,     In order to ensure we are providing the best quality care, we have reviewed your chart and see that you are due for:    Physical with pap smear: Pap smear is a screening test used to detect cervical cancer. It is recommended every three years for women 21 and older. The test should be done at least once every three years but women who are at greater risk for cervical cancer may need to have the test more often.     Please call the clinic at your earliest convenience to schedule an appointment. If you have had any of the screenings listed above at another facility, please call us so that we may update your chart.      Thank you for trusting us with your health care.    Sincerely,    [facility]/mb  [phone]  [phone]

## 2018-11-19 ENCOUNTER — E-VISIT (OUTPATIENT)
Dept: FAMILY MEDICINE | Facility: CLINIC | Age: 33
End: 2018-11-19
Payer: COMMERCIAL

## 2018-11-19 DIAGNOSIS — R21 RASH: Primary | ICD-10-CM

## 2019-07-26 DIAGNOSIS — D17.20 LIPOMA OF EXTREMITY: ICD-10-CM

## 2019-07-26 DIAGNOSIS — D17.9 INTRAMUSCULAR LIPOMA: Primary | ICD-10-CM

## 2019-07-26 DIAGNOSIS — M79.89 SOFT TISSUE MASS: ICD-10-CM

## 2019-08-06 ENCOUNTER — ANCILLARY PROCEDURE (OUTPATIENT)
Dept: MRI IMAGING | Facility: CLINIC | Age: 34
End: 2019-08-06
Attending: ORTHOPAEDIC SURGERY
Payer: COMMERCIAL

## 2019-08-06 DIAGNOSIS — M79.89 SOFT TISSUE MASS: ICD-10-CM

## 2019-08-06 DIAGNOSIS — D17.9 INTRAMUSCULAR LIPOMA: ICD-10-CM

## 2019-08-06 DIAGNOSIS — D17.20 LIPOMA OF EXTREMITY: ICD-10-CM

## 2019-08-06 PROCEDURE — A9585 GADOBUTROL INJECTION: HCPCS | Performed by: ORTHOPAEDIC SURGERY

## 2019-08-06 PROCEDURE — 73720 MRI LWR EXTREMITY W/O&W/DYE: CPT | Mod: RT | Performed by: RADIOLOGY

## 2019-08-06 RX ORDER — GADOBUTROL 604.72 MG/ML
10 INJECTION INTRAVENOUS ONCE
Status: COMPLETED | OUTPATIENT
Start: 2019-08-06 | End: 2019-08-06

## 2019-08-06 RX ADMIN — GADOBUTROL 9.5 ML: 604.72 INJECTION INTRAVENOUS at 14:48

## 2019-08-09 ENCOUNTER — OFFICE VISIT (OUTPATIENT)
Dept: ORTHOPEDICS | Facility: CLINIC | Age: 34
End: 2019-08-09
Payer: COMMERCIAL

## 2019-08-09 VITALS — WEIGHT: 208 LBS | BODY MASS INDEX: 36.86 KG/M2 | HEIGHT: 63 IN

## 2019-08-09 DIAGNOSIS — D21.21 BENIGN NEOPLASM OF SOFT TISSUES OF RIGHT LOWER EXTREMITY: Primary | ICD-10-CM

## 2019-08-09 ASSESSMENT — ENCOUNTER SYMPTOMS
JOINT SWELLING: 0
MUSCLE WEAKNESS: 0
FATIGUE: 1
SORE THROAT: 0
HOARSE VOICE: 0
POLYPHAGIA: 0
WEIGHT GAIN: 0
DECREASED APPETITE: 0
MYALGIAS: 1
ALTERED TEMPERATURE REGULATION: 0
TROUBLE SWALLOWING: 0
POLYDIPSIA: 0
HOT FLASHES: 0
CHILLS: 0
NERVOUS/ANXIOUS: 0
POOR WOUND HEALING: 0
SINUS CONGESTION: 0
NAIL CHANGES: 0
NIGHT SWEATS: 0
BACK PAIN: 1
NECK MASS: 0
WEIGHT LOSS: 0
ARTHRALGIAS: 1
DECREASED LIBIDO: 0
TASTE DISTURBANCE: 0
MUSCLE CRAMPS: 0
INCREASED ENERGY: 0
FEVER: 0
NECK PAIN: 1
DECREASED CONCENTRATION: 1
INSOMNIA: 1
SINUS PAIN: 0
HALLUCINATIONS: 0
DEPRESSION: 0
SMELL DISTURBANCE: 0
STIFFNESS: 1
SKIN CHANGES: 0

## 2019-08-09 ASSESSMENT — MIFFLIN-ST. JEOR: SCORE: 1607.61

## 2019-08-09 NOTE — LETTER
8/9/2019       RE: Marylou Mortensen  [address]  [place] [address]     Dear Colleague,    Thank you for referring your patient, Marylou Mortensen, to the [facility]. Please see a copy of my visit note below.    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    Initially seen back today in follow-up.  She had missed her previously recommended follow-ups for insurance reasons.    Today she reports that overall she is feeling well the surgery was helpful particularly in the medial thigh region.  She still is able to palpate a portion of the mass in the posterior right proximal thigh.  She is not certain but she feels this might be getting a little bit harder and possibly increasing in size.    On physical exam today her wound is healed.  She does have a firm palpable mass at the level of the gluteal crease in the right buttock and proximal thigh.  This is nontender and its mobile.  Clinically it subcutaneous at least in part.    MRI scan was taken today.  We do not have a comparative postoperative scan we do have the preoperative scan.  The vast majority of the tumor in the medial compartment of the anterior thigh has been excised.  The portion of the tumor in the posterior thigh remains.  On images which appeared to be a similar cut I did make sure that the mass had increased in size by approximately a centimeter or this is the posterior thigh mass.    I discussed the MRI findings with Marylou.  I expressed to her I was concerned that the mass was slowly enlarging.  We discussed whether she would like the mass excised at this time more would prefer follow-up MRI scan.  If that study showed enlargement that it would be a definitive recommendation to remove the tumor.  She would prefer the follow-up approach at this time.   She changes her mind in the interim she will contact us.    Impression: Persistent posterior thigh mass as known following prior resection.  Has increased 1 cm in size.    Plan: 1.  Follow-up in December 2019 with a MRI scan.  If this demonstrates interval increase we will recommend surgical removal.  2.  In the interim if she notices enlargement of size or an increase in discomfort she will contact us and we will proceed with excision at that time.    Again, thank you for allowing me to participate in the care of your patient.      Sincerely,    Dylon Mendez MD

## 2019-08-09 NOTE — NURSING NOTE
"Reason For Visit:   Chief Complaint   Patient presents with     RECHECK     follow up on MRI of right Femur        Ht 1.6 m (5' 3\")   Wt 94.3 kg (208 lb)   BMI 36.85 kg/m      Pain Assessment  Patient Currently in Pain: No    Hira Robles ATC  "

## 2019-08-09 NOTE — PROGRESS NOTES
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    Initially seen back today in follow-up.  She had missed her previously recommended follow-ups for insurance reasons.    Today she reports that overall she is feeling well the surgery was helpful particularly in the medial thigh region.  She still is able to palpate a portion of the mass in the posterior right proximal thigh.  She is not certain but she feels this might be getting a little bit harder and possibly increasing in size.    On physical exam today her wound is healed.  She does have a firm palpable mass at the level of the gluteal crease in the right buttock and proximal thigh.  This is nontender and its mobile.  Clinically it subcutaneous at least in part.    MRI scan was taken today.  We do not have a comparative postoperative scan we do have the preoperative scan.  The vast majority of the tumor in the medial compartment of the anterior thigh has been excised.  The portion of the tumor in the posterior thigh remains.  On images which appeared to be a similar cut I did make sure that the mass had increased in size by approximately a centimeter or this is the posterior thigh mass.    I discussed the MRI findings with Marylou.  I expressed to her I was concerned that the mass was slowly enlarging.  We discussed whether she would like the mass excised at this time more would prefer follow-up MRI scan.  If that study showed enlargement that it would be a definitive recommendation to remove the tumor.  She would prefer the follow-up approach at this time.  She changes her mind in the interim she will contact us.    Impression: Persistent posterior thigh mass as known following prior resection.  Has increased 1 cm in size.    Plan: 1.  Follow-up in December 2019 with a MRI scan.  If this demonstrates interval increase we will recommend surgical  removal.  2.  In the interim if she notices enlargement of size or an increase in discomfort she will contact us and we will proceed with excision at that time.  Answers for HPI/ROS submitted by the patient on 8/9/2019   General Symptoms: Yes  Skin Symptoms: Yes  HENT Symptoms: Yes  EYE SYMPTOMS: No  HEART SYMPTOMS: No  LUNG SYMPTOMS: No  INTESTINAL SYMPTOMS: No  URINARY SYMPTOMS: No  GYNECOLOGIC SYMPTOMS: Yes  BREAST SYMPTOMS: No  SKELETAL SYMPTOMS: Yes  BLOOD SYMPTOMS: No  NERVOUS SYSTEM SYMPTOMS: No  MENTAL HEALTH SYMPTOMS: Yes  Fever: No  Loss of appetite: No  Weight loss: No  Weight gain: No  Fatigue: Yes  Night sweats: No  Chills: No  Increased stress: No  Excessive hunger: No  Excessive thirst: No  Feeling hot or cold when others believe the temperature is normal: No  Loss of height: No  Post-operative complications: No  Surgical site pain: No  Hallucinations: No  Change in or Loss of Energy: No  Hyperactivity: No  Changes in hair: No  Changes in moles/birth marks: No  Itching: Yes  Rashes: Yes  Changes in nails: No  Acne: No  Hair in places you don't want it: Yes  Change in facial hair: Yes  Warts: No  Non-healing sores: No  Scarring: No  Flaking of skin: Yes  Color changes of hands/feet in cold : No  Sun sensitivity: No  Skin thickening: No  Ear pain: No  Ear discharge: Yes  Hearing loss: No  Tinnitus: No  Nosebleeds: No  Congestion: No  Sinus pain: No  Trouble swallowing: No   Voice hoarseness: No  Mouth sores: No  Sore throat: No  Tooth pain: No  Gum tenderness: No  Bleeding gums: No  Change in taste: No  Change in sense of smell: No  Dry mouth: No  Hearing aid used: No  Neck lump: No  Back pain: Yes  Muscle aches: Yes  Neck pain: Yes  Swollen joints: No  Joint pain: Yes  Bone pain: No  Muscle cramps: No  Muscle weakness: No  Joint stiffness: Yes  Bone fracture: No  Bleeding or spotting between periods: Yes  Heavy or painful periods: No  Irregular periods: Yes  Vaginal discharge: No  Hot flashes:  No  Vaginal dryness: No  Genital ulcers: No  Reduced libido: No  Painful intercourse: No  Difficulty with sexual arousal: No  Nervous or Anxious: No  Depression: No  Trouble sleeping: Yes  Trouble thinking or concentrating: Yes  Mood changes: No

## 2019-11-02 ENCOUNTER — HEALTH MAINTENANCE LETTER (OUTPATIENT)
Age: 34
End: 2019-11-02

## 2019-11-08 ENCOUNTER — OFFICE VISIT (OUTPATIENT)
Dept: FAMILY MEDICINE | Facility: CLINIC | Age: 34
End: 2019-11-08
Payer: COMMERCIAL

## 2019-11-08 VITALS
RESPIRATION RATE: 16 BRPM | WEIGHT: 209 LBS | TEMPERATURE: 98.2 F | HEIGHT: 63 IN | OXYGEN SATURATION: 98 % | BODY MASS INDEX: 37.03 KG/M2 | SYSTOLIC BLOOD PRESSURE: 135 MMHG | DIASTOLIC BLOOD PRESSURE: 87 MMHG | HEART RATE: 73 BPM

## 2019-11-08 DIAGNOSIS — H61.899 DEBRIS IN EAR CANAL: Primary | ICD-10-CM

## 2019-11-08 DIAGNOSIS — J20.9 ACUTE BRONCHITIS, UNSPECIFIED ORGANISM: ICD-10-CM

## 2019-11-08 PROCEDURE — 99213 OFFICE O/P EST LOW 20 MIN: CPT | Performed by: NURSE PRACTITIONER

## 2019-11-08 RX ORDER — BENZONATATE 200 MG/1
200 CAPSULE ORAL 3 TIMES DAILY PRN
Qty: 20 CAPSULE | Refills: 0 | Status: SHIPPED | OUTPATIENT
Start: 2019-11-08 | End: 2024-02-14

## 2019-11-08 RX ORDER — AZITHROMYCIN 250 MG/1
TABLET, FILM COATED ORAL
Qty: 6 TABLET | Refills: 0 | Status: SHIPPED | OUTPATIENT
Start: 2019-11-08 | End: 2019-11-13

## 2019-11-08 ASSESSMENT — PAIN SCALES - GENERAL: PAINLEVEL: NO PAIN (0)

## 2019-11-08 ASSESSMENT — MIFFLIN-ST. JEOR: SCORE: 1612.15

## 2019-11-08 NOTE — PROGRESS NOTES
Subjective     Marylou Mortensen is a 34 year old female who presents to clinic today for the following health issues:    HPI   Concern - Ear problem  Onset: 4 months ago    Description:   Clogged ears    Intensity: moderate    Progression of Symptoms:  worsening    Accompanying Signs & Symptoms:  Difficulty hearing, cough    Previous history of similar problem:   yes    Precipitating factors:   Worsened by: None    Alleviating factors:  Improved by: None    Therapies Tried and outcome: Hydrogen peroxide and otc cold meds: no relief      Left ear completely clogged for couple days. Right a little bit but not bad. No pain. Hearing decreased in left but not right.    She also complains of cough x1 week.  Works as  photoprapher  Worse with dry air  Wakes up coughing so hard almost throws up  No sore throat or fever  No myalgias  No chest pain or shortness of breath  No known exposures    Not pregnant or breastfeeding        Patient Active Problem List   Diagnosis     Crohn's disease of both small and large intestine without complication (H)     Mass of right thigh     Intramuscular lipoma     Past Surgical History:   Procedure Laterality Date     COLONOSCOPY       ENDOSCOPY       EXCISE SOFT TISSUE TUMOR THIGH Right 2018    Procedure: EXCISE SOFT TISSUE TUMOR THIGH;  Biopsy Right Thigh Tumor;  Surgeon: Dylon Mendez MD;  Location: [facility] OR     EXCISE SOFT TISSUE TUMOR THIGH Right 2018    Procedure: EXCISE SOFT TISSUE TUMOR THIGH;  Right Thigh Tumor Removal ;  Surgeon: Dylon Mendez MD;  Location: [facility] OR       Social History     Tobacco Use     Smoking status: Never Smoker     Smokeless tobacco: Never Used   Substance Use Topics     Alcohol use: Yes     Comment: Rarely, maybe one drink once a month.       Family History   Problem Relation Age of Onset     Diabetes Father      Obesity Father      Breast Cancer Father      Coronary Artery Disease Maternal Grandmother 50     Cardiac Sudden  "Death Maternal Grandmother      Breast Cancer Paternal Grandmother      Cancer Paternal Grandmother      Obesity Paternal Grandmother      Bone Cancer Paternal Grandmother      Tumor Mother         Multiple tumors over the body     Heart Disease Mother      Crohn's Disease Mother      Osteoporosis Mother      Migraines Mother      Bone Cancer Mother      Celiac Disease Brother      Cancer Maternal Grandfather      No Known Problems Brother      Medical History Unknown Paternal Grandfather          Current Outpatient Medications   Medication Sig Dispense Refill     AZATHIOPRINE PO Take by mouth daily        azithromycin (ZITHROMAX) 250 MG tablet Take 2 tablets (500 mg) by mouth daily for 1 day, THEN 1 tablet (250 mg) daily for 4 days. 6 tablet 0     benzonatate (TESSALON) 200 MG capsule Take 1 capsule (200 mg) by mouth 3 times daily as needed for cough 20 capsule 0     IBUPROFEN PO Take by mouth every 8 hours as needed for moderate pain       InFLIXimab (REMICADE IV) Every 6-8 weeks       VITAMIN D, CHOLECALCIFEROL, PO Take 2,000 Units by mouth daily       No Known Allergies  BP Readings from Last 3 Encounters:   11/08/19 135/87   07/26/18 125/86   07/18/18 129/86    Wt Readings from Last 3 Encounters:   11/08/19 94.8 kg (209 lb)   08/09/19 94.3 kg (208 lb)   07/26/18 95.7 kg (211 lb)                  Reviewed and updated as needed this visit by Provider  Tobacco  Allergies  Meds  Problems  Med Hx  Surg Hx  Fam Hx         Review of Systems   ROS COMP: Constitutional, HEENT, cardiovascular, pulmonary, gi and gu systems are negative, except as otherwise noted.      Objective    /87   Pulse 73   Temp 98.2  F (36.8  C) (Oral)   Resp 16   Ht 1.6 m (5' 3\")   Wt 94.8 kg (209 lb)   LMP  (LMP Unknown)   SpO2 98%   Breastfeeding? No   BMI 37.02 kg/m    Body mass index is 37.02 kg/m .  Physical Exam   GENERAL: healthy, alert and no distress  EYES: Eyes grossly normal to inspection, PERRL and conjunctivae " and sclerae normal  HENT: ears: both canals with debris prior to ear wash, both canals clear post-lavage, TM WNL. Mouth and nose WNL  NECK: no adenopathy, no asymmetry, masses, or scars and thyroid normal to palpation  RESP: lungs clear to auscultation - no rales, rhonchi or wheezes  CV: regular rate and rhythm, normal S1 S2, no S3 or S4, no murmur, click or rub, no peripheral edema and peripheral pulses strong  MS: no gross musculoskeletal defects noted, no edema  PSYCH: mentation appears normal, affect normal/bright    Diagnostic Test Results:  none         Assessment & Plan     1. Debris in ear canal  Ear wash with complete resolution of symptoms.   - REMOVE IMPACTED CERUMEN    2. Acute bronchitis, unspecified organism  Push fluids, rest  Start antibiotic today. Know that your cough may not be completely gone when you're done with your antibiotics  Benzonatate for cough  You can use a humidifier by your bed at night. Distilled water should be used with the humidifier.  Tylenol or ibuprofen as needed for pain or fever  Follow up if you are worsening or not improving follow up in 5 days  - azithromycin (ZITHROMAX) 250 MG tablet; Take 2 tablets (500 mg) by mouth daily for 1 day, THEN 1 tablet (250 mg) daily for 4 days.  Dispense: 6 tablet; Refill: 0  - benzonatate (TESSALON) 200 MG capsule; Take 1 capsule (200 mg) by mouth 3 times daily as needed for cough  Dispense: 20 capsule; Refill: 0         See Patient Instructions    Return in about 1 week (around 11/15/2019), or if symptoms worsen or fail to improve.     The benefits, risks and potential side effects were discussed in detail. Black box warnings discussed as relevant. All patient questions were answered. The patient was instructed to follow up immediately if any adverse reactions develop.    Return precautions discussed, including when to seek urgent/emergent care.    Patient verbalizes understanding and agrees with plan of care. Patient stable for  discharge.      HARESH Torres CNP  [facility]

## 2019-11-08 NOTE — PATIENT INSTRUCTIONS
Push fluids, rest  Start antibiotic today. Know that your cough may not be completely gone when you're done with your antibiotics  Benzonatate for cough  You can use a humidifier by your bed at night. Distilled water should be used with the humidifier.  Tylenol or ibuprofen as needed for pain or fever  Follow up if you are worsening or not improving follow up in 5 days    Ears washed out today in clinic with good results

## 2019-12-06 ENCOUNTER — TELEPHONE (OUTPATIENT)
Dept: ORTHOPEDICS | Facility: CLINIC | Age: 34
End: 2019-12-06

## 2019-12-06 DIAGNOSIS — D17.9 LIPOMA: Primary | ICD-10-CM

## 2019-12-06 NOTE — TELEPHONE ENCOUNTER
Called pt to schedule appts. Left vm for pt with direct number    ----- Message from Anne Marie Muñoz RN sent at 12/6/2019 10:19 AM CST -----  Regarding: Tumor follow up  Hi    Could you please call this patient to schedule an MRI of the thigh and follow up appt. With Dr. Mendez for some time this  month.  MRI order is in [facility].    Thanks

## 2019-12-13 ENCOUNTER — TELEPHONE (OUTPATIENT)
Dept: ORTHOPEDICS | Facility: CLINIC | Age: 34
End: 2019-12-13

## 2019-12-13 NOTE — TELEPHONE ENCOUNTER
Called pt to schedule appts. Left vm with direct number.    ----- Message from Anne Marie Muñoz RN sent at 12/6/2019 10:19 AM CST -----  Regarding: Tumor follow up  Hi    Could you please call this patient to schedule an MRI of the thigh and follow up appt. With Dr. Mendez for some time this  month.  MRI order is in [facility].    Thanks

## 2019-12-17 ENCOUNTER — HOSPITAL ENCOUNTER (OUTPATIENT)
Dept: MRI IMAGING | Facility: CLINIC | Age: 34
Discharge: HOME | End: 2019-12-17
Attending: ORTHOPAEDIC SURGERY | Admitting: ORTHOPAEDIC SURGERY
Payer: COMMERCIAL

## 2019-12-17 DIAGNOSIS — D17.9 LIPOMA: ICD-10-CM

## 2019-12-17 PROCEDURE — A9585 GADOBUTROL INJECTION: HCPCS | Performed by: ORTHOPAEDIC SURGERY

## 2019-12-17 PROCEDURE — 25500064 ZZH RX 255 OP 636: Performed by: ORTHOPAEDIC SURGERY

## 2019-12-17 PROCEDURE — 73720 MRI LWR EXTREMITY W/O&W/DYE: CPT | Mod: RT

## 2019-12-17 RX ORDER — GADOBUTROL 604.72 MG/ML
10 INJECTION INTRAVENOUS ONCE
Status: COMPLETED | OUTPATIENT
Start: 2019-12-17 | End: 2019-12-17

## 2019-12-17 RX ADMIN — GADOBUTROL 9.5 ML: 604.72 INJECTION INTRAVENOUS at 18:53

## 2019-12-20 ENCOUNTER — OFFICE VISIT (OUTPATIENT)
Dept: ORTHOPEDICS | Facility: CLINIC | Age: 34
End: 2019-12-20
Payer: COMMERCIAL

## 2019-12-20 DIAGNOSIS — D17.9 SPINDLE CELL LIPOMA: Primary | ICD-10-CM

## 2019-12-20 ASSESSMENT — ENCOUNTER SYMPTOMS
JOINT SWELLING: 0
EYE REDNESS: 0
DECREASED APPETITE: 0
ABDOMINAL PAIN: 1
SWOLLEN GLANDS: 0
WEIGHT LOSS: 1
DIZZINESS: 0
ALTERED TEMPERATURE REGULATION: 0
SPEECH CHANGE: 0
POOR WOUND HEALING: 0
DYSPNEA ON EXERTION: 0
EYE WATERING: 0
BLOATING: 1
MUSCLE CRAMPS: 0
LOSS OF CONSCIOUSNESS: 0
NAIL CHANGES: 0
HEADACHES: 1
MUSCLE WEAKNESS: 0
SHORTNESS OF BREATH: 0
HEMOPTYSIS: 0
WEIGHT GAIN: 0
NECK MASS: 0
NIGHT SWEATS: 0
HOT FLASHES: 0
EYE PAIN: 0
STIFFNESS: 0
BRUISES/BLEEDS EASILY: 0
MYALGIAS: 1
HOARSE VOICE: 0
TREMORS: 0
TINGLING: 0
POSTURAL DYSPNEA: 0
SKIN CHANGES: 0
WHEEZING: 0
ARTHRALGIAS: 1
PARALYSIS: 0
WEAKNESS: 0
RECTAL PAIN: 0
HEARTBURN: 1
DISTURBANCES IN COORDINATION: 1
INCREASED ENERGY: 0
FATIGUE: 1
BLOOD IN STOOL: 0
VOMITING: 0
NECK PAIN: 1
COUGH: 1
DECREASED LIBIDO: 0
CHILLS: 0
BACK PAIN: 1
CONSTIPATION: 1
DIARRHEA: 1
SPUTUM PRODUCTION: 0
COUGH DISTURBING SLEEP: 0
NUMBNESS: 0
DOUBLE VISION: 0
POLYDIPSIA: 0
TROUBLE SWALLOWING: 0
SINUS CONGESTION: 0
FEVER: 0
POLYPHAGIA: 0
SINUS PAIN: 0
HALLUCINATIONS: 0
SMELL DISTURBANCE: 0
TASTE DISTURBANCE: 0
NAUSEA: 1
MEMORY LOSS: 0
SEIZURES: 0
SNORES LOUDLY: 0

## 2019-12-20 NOTE — NURSING NOTE
Chief Complaint   Patient presents with     Results     F/u MRI Scan done on 12/17/2019 S/p Right Thigh Tumor Removal - Right DOS: 07/26/2018       34 year old  1985        [id number] - [facility] [address] [address]    No Known Allergies    Current Outpatient Medications   Medication     AZATHIOPRINE PO     IBUPROFEN PO     InFLIXimab (REMICADE IV)     VITAMIN D, CHOLECALCIFEROL, PO     benzonatate (TESSALON) 200 MG capsule     No current facility-administered medications for this visit.

## 2019-12-20 NOTE — PROGRESS NOTES
Chief Complaint: Right thigh mass, MRI review  Dx - Atypical spindle cell lipomatous tumor     07/26/18 -Procedure performed: Removal of right medial thigh tumor, benign, deep, 15 cm    HPI: Marylou is here for follow-up and review of her MRI related to her right thigh mass, which has been biopsied twice and is an atypical spindle cell lipomatous tumor.  Patient reports that she gets occasional aching in what feels like her femur.  This happened before her previous surgery as well.  It is not frequent, it is sporadic.  It does not seem to increase with weightbearing or wake her at night.  She also feels some firmness and tenderness in the back of the thigh when she sits on a hard chair or does prolonged sitting.  This is similar as before.  She is unsure if the mass is increasing.  She denies any numbness or tingling.  She is unsure whether she would like to have surgery.  She is here for MRI review.    Physical Exam: Marylou is a pleasant 34-year-old female who is alert and oriented no apparent distress.  She has a nonantalgic reciprocal gait without gait assistance.  She continues to have the firm mass in the posterior upper thigh region.  She otherwise has full hip and knee range of motion.  She is neurovascularly intact distally.    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 Maximum dimensions are 4 x 8 x 11 cm, unchanged.    Impression: 34-year-old female with no sign of progression of her spindle cell atypical lipomatous tumor of the right thigh    Plan: This MRI shows no sign of progression.  She is having minimal to mild symptoms at this point.  We would advise a follow-up MRI with and without contrast of the right thigh in 6 months to again assess for any growth or change.  Certainly if she has any symptoms or notices rapid growth, we would see her sooner.  We will call to schedule that MRI when he gets closer to 6 months.  She agrees  with the plan of care and all questions have been answered.  Patient was also examined by Dr. Mendez, and he agrees with the plan of care.

## 2019-12-20 NOTE — LETTER
12/20/2019       RE: Marylou Mortensen  [address]  [place] [address]     Dear Colleague,    Thank you for referring your patient, Marylou Mortensen, to the [facility] at [facility]. Please see a copy of my visit note below.    Chief Complaint: Right thigh mass, MRI review  Dx - Atypical spindle cell lipomatous tumor     07/26/18 -Procedure performed: Removal of right medial thigh tumor, benign, deep, 15 cm    HPI: Marylou is here for follow-up and review of her MRI related to her right thigh mass, which has been biopsied twice and is an atypical spindle cell lipomatous tumor.  Patient reports that she gets occasional aching in what feels like her femur.  This happened before her previous surgery as well.  It is not frequent, it is sporadic.  It does not seem to increase with weightbearing or wake her at night.  She also feels some firmness and tenderness in the back of the thigh when she sits on a hard chair or does prolonged sitting.  This is similar as before.  She is unsure if the mass is increasing.  She denies any numbness or tingling.  She is unsure whether she would like to have surgery.  She is here for MRI review.    Physical Exam: Marylou is a pleasant 34-year-old female who is alert and oriented no apparent distress.  She has a nonantalgic reciprocal gait without gait assistance.  She continues to have the firm mass in the posterior upper thigh region.  She otherwise has full hip and knee range of motion.  She is neurovascularly intact distally.    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 Maximum dimensions are 4 x 8 x 11 cm, unchanged.    Impression: 34-year-old female with no sign of progression of her spindle cell atypical lipomatous tumor of the right thigh    Plan: This MRI shows no sign of progression.  She is having minimal to mild symptoms at this  point.  We would advise a follow-up MRI with and without contrast of the right thigh in 6 months to again assess for any growth or change.  Certainly if she has any symptoms or notices rapid growth, we would see her sooner.  We will call to schedule that MRI when he gets closer to 6 months.  She agrees with the plan of care and all questions have been answered.  Patient was also examined by Dr. Mendez, and he agrees with the plan of care.    Dylon Mendez MD

## 2020-05-11 ENCOUNTER — TRANSFERRED RECORDS (OUTPATIENT)
Dept: HEALTH INFORMATION MANAGEMENT | Facility: CLINIC | Age: 35
End: 2020-05-11

## 2020-05-13 ENCOUNTER — TRANSFERRED RECORDS (OUTPATIENT)
Dept: HEALTH INFORMATION MANAGEMENT | Facility: CLINIC | Age: 35
End: 2020-05-13

## 2020-05-13 LAB
ALT SERPL-CCNC: 77 U/L (ref 0–78)
AST SERPL-CCNC: 77 U/L (ref 0–37)

## 2020-07-22 ENCOUNTER — MYC MEDICAL ADVICE (OUTPATIENT)
Dept: FAMILY MEDICINE | Facility: CLINIC | Age: 35
End: 2020-07-22

## 2020-07-22 ENCOUNTER — OFFICE VISIT (OUTPATIENT)
Dept: URGENT CARE | Facility: URGENT CARE | Age: 35
End: 2020-07-22

## 2020-07-22 ENCOUNTER — VIRTUAL VISIT (OUTPATIENT)
Dept: FAMILY MEDICINE | Facility: OTHER | Age: 35
End: 2020-07-22

## 2020-07-22 ENCOUNTER — TELEPHONE (OUTPATIENT)
Dept: URGENT CARE | Facility: URGENT CARE | Age: 35
End: 2020-07-22

## 2020-07-22 VITALS
OXYGEN SATURATION: 99 % | HEART RATE: 88 BPM | TEMPERATURE: 99 F | DIASTOLIC BLOOD PRESSURE: 87 MMHG | SYSTOLIC BLOOD PRESSURE: 128 MMHG | RESPIRATION RATE: 18 BRPM

## 2020-07-22 DIAGNOSIS — H60.333 ACUTE SWIMMER'S EAR OF BOTH SIDES: ICD-10-CM

## 2020-07-22 DIAGNOSIS — H66.93 ACUTE BILATERAL OTITIS MEDIA: Primary | ICD-10-CM

## 2020-07-22 DIAGNOSIS — H66.003 NON-RECURRENT ACUTE SUPPURATIVE OTITIS MEDIA OF BOTH EARS WITHOUT SPONTANEOUS RUPTURE OF TYMPANIC MEMBRANES: Primary | ICD-10-CM

## 2020-07-22 PROCEDURE — 99213 OFFICE O/P EST LOW 20 MIN: CPT | Performed by: NURSE PRACTITIONER

## 2020-07-22 RX ORDER — AMOXICILLIN 500 MG/1
500 CAPSULE ORAL 3 TIMES DAILY
Qty: 21 CAPSULE | Refills: 0 | Status: SHIPPED | OUTPATIENT
Start: 2020-07-22 | End: 2020-07-29

## 2020-07-22 RX ORDER — OFLOXACIN 3 MG/ML
5 SOLUTION AURICULAR (OTIC) DAILY
Qty: 2 ML | Refills: 0 | Status: SHIPPED | OUTPATIENT
Start: 2020-07-22 | End: 2020-07-29

## 2020-07-22 RX ORDER — OFLOXACIN 3 MG/ML
5 SOLUTION AURICULAR (OTIC) DAILY
Qty: 2 ML | Refills: 0 | Status: SHIPPED | OUTPATIENT
Start: 2020-07-22 | End: 2020-07-25

## 2020-07-22 ASSESSMENT — ENCOUNTER SYMPTOMS
SHORTNESS OF BREATH: 0
SORE THROAT: 0
VOMITING: 0
CHILLS: 0
FEVER: 1
HEADACHES: 0
NAUSEA: 0
COUGH: 0
RHINORRHEA: 0
DIARRHEA: 0

## 2020-07-22 NOTE — TELEPHONE ENCOUNTER
Pt's pharmacy is out of medication and she would like her script sent to  [facility] [place]     [address], [place] [address] .

## 2020-07-22 NOTE — TELEPHONE ENCOUNTER
Can patient contact her [facility] pharmacy and ask them to transfer scripts to new pharmacy, [facility] in [place]?   Usually local pharmacies should be able to just transfer from one another (unless controlled substances, which these are not)  If not able, please have provider re-send scripts to preferred pharmacy.    Mary Velez RN  [facility]

## 2020-07-22 NOTE — PROGRESS NOTES
"Date: 2020 11:52:07  Clinician: Raji Ramachandran  Clinician NPI: [id number]  Patient: WENCESLAO DIAZ  Patient : 1985  Patient Address: [facility] [place] [address]  Patient Phone: [phone]  Visit Protocol: Ear pain  Patient Summary:  WENCESLAO is a 35 year old ( : 1985 ) female who initiated a Visit for swimmer's ear (outer ear infection). When asked the question \"Please sign me up to receive news, health information and promotions. \", WENCESLAO responded \"No\".   A synchronous visit is necessary because the patient reported the following abnormal symptoms:   Bilateral ear pain (requires clarification)    Severe ear pain (7-9 on a 10 point pain scale, requires clarification)   WENCESLAO uploaded images of her ear(s).   WENCESLAO reports that her ear pain started 2-4 days ago. The ear pain is located outside (external surface) and inside both ears.   In addition to the ear pain, WENCESLAO is experiencing redness, a feeling of fullness, and tenderness in the ear(s). Her ear(s) is tender to the touch on the ear canal, tragus, and mastoid process. WENCESLAO reports having fluid draining from the ear(s). She feels feverish.   Symptom Details     Pain: WENCESLAO is experiencing severe pain (7-9 on a 10 point pain scale). It gets worse when she eats or chews. It does not get worse when she gently pulls on the earlobe(s).     Drainage: The color of the fluid coming out of her ear(s) is clear. The fluid is malodorous.     Temperature: Her current temperature is 99.4 degrees Fahrenheit.      WENCESLAO denies itchiness in the ear(s). She also denies recent injuries near the ear(s), ever having ear tubes, and the possibility of a foreign object in the ear(s).   Precipitating events   WENCESLAO denies swimming and flying within the past week.   Pertinent medical history   Weight: 220 lbs   She denies pregnancy and denies breastfeeding. Her last period was over a month ago.   She does not smoke or use smokeless tobacco.   " Additional health information pertinent to this Visit as reported by the patient (free text): My temp has also been fluctuating between 98.7 and 103.7. The last 2 nights I've had chills to where I've needed multiple blankets, then a few hours later I'm fine again   Weight: 220 lbs    MEDICATIONS: Remicade intravenous, azathioprine oral, ALLERGIES: NKDA  Clinician Response:  Dear WENCESLAO,   I am sorry you are not feeling well. To determine the most appropriate care for you, I would like you to be seen in person to further discuss your health history and symptoms.  You will not be charged for this Visit. Thank you for trusting us with your care.   Diagnosis: Refer for additional evaluation  Diagnosis ICD: R69  Triage Notes: I reviewed the patient's history, verified their identity, and explained the Visit process.    patient is immunocompromised with fever, ear drainage, mastoid tenderness.  Synchronous Triage: phone, status: completed, duration: 258 seconds

## 2020-07-22 NOTE — PATIENT INSTRUCTIONS
Patient Education     Otitis Media (Middle-Ear Infection) in Adults  Otitis media is another name for a middle-ear infection. It means an infection behind your eardrum. This kind of ear infection can happen after any condition that keeps fluid from draining from the middle ear. These conditions include allergies, a cold, a sore throat, or a respiratory infection.  Middle-ear infections are common in children, but they can also happen in adults. An ear infection in an adult may mean a more serious problem than in a child. So you may need additional tests. If you have an ear infection, you should see your health care provider for treatment.  What are the types of middle-ear infections?  Infections can affect the middle ear in several ways. They are:    Acute otitis media. This middle-ear infection occurs suddenly. It causes swelling and redness. Fluid and mucus become trapped inside the ear. You can have a fever and ear pain.    Otitis media with effusion. Fluid (effusion) and mucus build up in the middle ear after the infection goes away. You may feel like your middle ear is full. This can continue for months and may affect your hearing.    Chronic otitis media with effusion. Fluid (effusion) remains in the middle ear for a long time. Or it builds up again and again, even though there is no infection. This type of middle-ear infection may be hard to treat. It may also affect your hearing.  Who is more likely to get a middle-ear infection?  You are more likely to get an ear infection if you:    Smoke or are around someone who smokes    Have seasonal or year-round allergy symptoms    Have a cold or other upper respiratory infection  What causes a middle-ear infection?  The middle ear connects to the throat by a canal called the eustachian tube. This tube helps even out the pressure between the outer ear and the inner ear. A cold or allergy can irritate the tube or cause the area around it to swell. This can keep  fluid from draining from the middle ear. The fluid builds up behind the eardrum. Bacteria and viruses can grow in this fluid. The bacteria and viruses cause the middle-ear infection.  What are the symptoms of a middle-ear infection?  Common symptoms of a middle-ear infection in adults are:    Pain in 1 or both ears    Drainage from the ear    Muffled hearing    Sore throat   You may also have a fever. Rarely, your balance can be affected.  These symptoms may be the same as for other conditions. It s important to talk with your health care provider if you think you have a middle-ear infection. If you have a high fever, severe pain behind your ear, or paralysis in your face, see your provider as soon as you can.  How is a middle-ear infection diagnosed?  Your health care provider will take a medical history and do a physical exam. He or she will look at the outer ear and eardrum with an otoscope. The otoscope is a lighted tool that lets your provider see inside the ear. A pneumatic otoscope blows a puff of air into the ear to check how well your eardrum moves. If you eardrum doesn t move well, it may mean you have fluid behind it.  Your provider may also do a test called tympanometry. This test tells how well the middle ear is working. It can find any changes in pressure in the middle ear. Your provider may test your hearing with a tuning fork.  How is a middle-ear infection treated?  A middle-ear infection may be treated with:    Antibiotics, taken by mouth or as ear drops    Medication for pain    Decongestants, antihistamines, or nasal steroids  Your health care provider may also have you try autoinsufflation. This helps adjust the air pressure in your ear. For this, you pinch your nose and gently exhale. This forces air back through the eustachian tube.  The exact treatment for your ear infection will depend on the type of infection you have. In general, if your symptoms don t get better in 48 to 72 hours, contact  your health care provider.  Middle-ear infections can cause long-term problems if not treated. They can lead to:    Infection in other parts of the head    Permanent hearing loss    Paralysis of a nerve in your face  If you have a middle-ear infection that doesn t get better, you may need to see an ear, nose, and throat specialist (otolaryngologist). You may need a CT scan or MRI to check for head and neck cancer.  Ear tubes  Sometimes fluid stays in the middle ear even after you take antibiotics and the infection goes away. In this case, your health care provider may suggest that a small tube be placed in your ear. The tube is put at the opening of the eardrum. The tube keeps fluid from building up and relieves pressure in the middle ear. It can also help you hear better. This surgery is called myringotomy. It is not often done in adults.  The tubes usually fall out on their own after 6 months to a year.    9851-3489 [employer]. [address] [place] [address]. All rights reserved. This information is not intended as a substitute for professional medical care. Always follow your healthcare professional's instructions.           Patient Education     Reducing the Risk of Middle Ear Infections     Good handwashing can help your child prevent ear infections.     Most children have had at least one middle ear infection by the age of 2. Treatment may depend on whether the problem is acute or chronic. It also depends on how often it comes back and how long it lasts.  Reducing risk factors  Some behaviors or surroundings increase your child s risk of ear infection. Reducing such risk factors can be helpful at any point in treatment. The tips below may help:    If your child goes to group , he or she runs a greater risk of getting colds or flu. This may then lead to an ear infection. Help prevent these illnesses by teaching your child to wash his or her hands often.    If your child has  nasal allergies, do your best to control dust, mold, mildew, and pet hair in the house. Also stop or greatly limit your child s contact with secondhand smoke.    If food allergies are a problem, identify the food that triggers the reaction. Help your child avoid it.  Watching and waiting  Sometimes it is better to proceed with caution in the following ways:    If your child is diagnosed with an ear infection, the healthcare provider may prescribe antibiotics and will suggest a period of  watchful waiting.  This means not filling any prescriptions right away. Instead of antibiotics, a trial of medicines to relieve symptoms including those for pain or fever, is advised. This is along with waiting some time to see if a child improves without antibiotic therapy. Whether or not your healthcare provider prescribes immediate antibiotics or a period of watchful waiting depends on your child's age and risk factors.    During this time, your child should be watched to see if his or her symptoms are improving and to make sure new symptoms, such as fever or vomiting, don't develop. If a child doesn't improve within a few days or develops new symptoms, antibiotics will usually be started.    Date Last Reviewed: 12/1/2016 2000-2019 [employer]. [address] [place] [address], [place] [address]. All rights reserved. This information is not intended as a substitute for professional medical care. Always follow your healthcare professional's instructions.

## 2020-07-22 NOTE — PROGRESS NOTES
"SUBJECTIVE:   Marylou Mortensen is a 35 year old female presenting with a chief complaint of   Chief Complaint   Patient presents with     Otalgia       She is an established patient of [facility].    Ear pain    Onset of symptoms was 3 day(s) ago.  Course of illness is worsening.    Severity moderate  Current and Associated symptoms: fever and ear pain bilateral  Treatment measures tried include Tylenol/Ibuprofen.  Predisposing factors include None.      Review of Systems   Constitutional: Positive for fever. Negative for chills.   HENT: Positive for ear pain. Negative for congestion, rhinorrhea and sore throat.    Respiratory: Negative for cough and shortness of breath.    Gastrointestinal: Negative for diarrhea, nausea and vomiting.   Neurological: Negative for headaches.   All other systems reviewed and are negative.      Past Medical History:   Diagnosis Date     Anemia      Arthritis      Crohn's disease (H)      GERD (gastroesophageal reflux disease)     Occasional symptoms, does not need to take medication regularly.       H/O Elevated liver enzymes     Patient was told it was due to her carb-heavy diet, and \"pre-diabetes\"     H/O tension headache      Immune disorder (H)      PONV (postoperative nausea and vomiting)      Prediabetes      RA (rheumatoid arthritis) (H)      Reduced vision      Family History   Problem Relation Age of Onset     Diabetes Father      Obesity Father      Breast Cancer Father      Coronary Artery Disease Father      Bone Cancer Father      Coronary Artery Disease Maternal Grandmother 50     Cardiac Sudden Death Maternal Grandmother      Breast Cancer Maternal Grandmother      Bone Cancer Maternal Grandmother      Breast Cancer Paternal Grandmother      Cancer Paternal Grandmother      Obesity Paternal Grandmother      Bone Cancer Paternal Grandmother      Tumor Mother         Multiple tumors over the body     Heart Disease Mother      Crohn's Disease Mother      Osteoporosis " Mother      Migraines Mother      Bone Cancer Mother      Mental Illness Mother      Depression Mother      Coronary Artery Disease Mother      Celiac Disease Brother      Cancer Maternal Grandfather      No Known Problems Brother      Medical History Unknown Paternal Grandfather      Current Outpatient Medications   Medication Sig Dispense Refill     amoxicillin (AMOXIL) 500 MG capsule Take 1 capsule (500 mg) by mouth 3 times daily for 7 days 21 capsule 0     ofloxacin (FLOXIN) 0.3 % otic solution Place 5 drops into both ears daily for 7 days 2 mL 0     AZATHIOPRINE PO Take by mouth daily        benzonatate (TESSALON) 200 MG capsule Take 1 capsule (200 mg) by mouth 3 times daily as needed for cough (Patient not taking: Reported on 12/20/2019) 20 capsule 0     IBUPROFEN PO Take by mouth every 8 hours as needed for moderate pain       InFLIXimab (REMICADE IV) Every 6-8 weeks       VITAMIN D, CHOLECALCIFEROL, PO Take 2,000 Units by mouth daily       Social History     Tobacco Use     Smoking status: Never Smoker     Smokeless tobacco: Never Used   Substance Use Topics     Alcohol use: Yes     Comment: Rarely, maybe one drink once a month.         OBJECTIVE  /87   Pulse 88   Temp 99  F (37.2  C)   Resp 18   SpO2 99%     Physical Exam  Vitals signs and nursing note reviewed.   Constitutional:       General: She is not in acute distress.     Appearance: She is well-developed. She is not diaphoretic.   HENT:      Head: Normocephalic and atraumatic.      Right Ear: External ear normal. Tympanic membrane is erythematous and bulging.      Left Ear: External ear normal. Tympanic membrane is erythematous and bulging.      Ears:      Comments: Swelling and erythema of both ear canals.  Eyes:      Pupils: Pupils are equal, round, and reactive to light.   Neck:      Musculoskeletal: Normal range of motion and neck supple.   Pulmonary:      Effort: Pulmonary effort is normal. No respiratory distress.      Breath sounds:  Normal breath sounds.   Lymphadenopathy:      Cervical: No cervical adenopathy.   Skin:     General: Skin is warm and dry.   Neurological:      Mental Status: She is alert.      Cranial Nerves: No cranial nerve deficit.         ASSESSMENT:      ICD-10-CM    1. Non-recurrent acute suppurative otitis media of both ears without spontaneous rupture of tympanic membranes  H66.003 amoxicillin (AMOXIL) 500 MG capsule   2. Acute swimmer's ear of both sides  H60.333 ofloxacin (FLOXIN) 0.3 % otic solution        PLAN:  Plan of treatment is discussed.The benefits, risks and potential side effects of medications discussed. Black box warning discussed as relevant.  All questions are answered.  Instructions were given  to follow up immediately if any adverse reactions or worsening symptoms develop.   Understanding of plan of care was verbalized by patient        Patient Instructions       Patient Education     Otitis Media (Middle-Ear Infection) in Adults  Otitis media is another name for a middle-ear infection. It means an infection behind your eardrum. This kind of ear infection can happen after any condition that keeps fluid from draining from the middle ear. These conditions include allergies, a cold, a sore throat, or a respiratory infection.  Middle-ear infections are common in children, but they can also happen in adults. An ear infection in an adult may mean a more serious problem than in a child. So you may need additional tests. If you have an ear infection, you should see your health care provider for treatment.  What are the types of middle-ear infections?  Infections can affect the middle ear in several ways. They are:    Acute otitis media. This middle-ear infection occurs suddenly. It causes swelling and redness. Fluid and mucus become trapped inside the ear. You can have a fever and ear pain.    Otitis media with effusion. Fluid (effusion) and mucus build up in the middle ear after the infection goes away. You may  feel like your middle ear is full. This can continue for months and may affect your hearing.    Chronic otitis media with effusion. Fluid (effusion) remains in the middle ear for a long time. Or it builds up again and again, even though there is no infection. This type of middle-ear infection may be hard to treat. It may also affect your hearing.  Who is more likely to get a middle-ear infection?  You are more likely to get an ear infection if you:    Smoke or are around someone who smokes    Have seasonal or year-round allergy symptoms    Have a cold or other upper respiratory infection  What causes a middle-ear infection?  The middle ear connects to the throat by a canal called the eustachian tube. This tube helps even out the pressure between the outer ear and the inner ear. A cold or allergy can irritate the tube or cause the area around it to swell. This can keep fluid from draining from the middle ear. The fluid builds up behind the eardrum. Bacteria and viruses can grow in this fluid. The bacteria and viruses cause the middle-ear infection.  What are the symptoms of a middle-ear infection?  Common symptoms of a middle-ear infection in adults are:    Pain in 1 or both ears    Drainage from the ear    Muffled hearing    Sore throat   You may also have a fever. Rarely, your balance can be affected.  These symptoms may be the same as for other conditions. It s important to talk with your health care provider if you think you have a middle-ear infection. If you have a high fever, severe pain behind your ear, or paralysis in your face, see your provider as soon as you can.  How is a middle-ear infection diagnosed?  Your health care provider will take a medical history and do a physical exam. He or she will look at the outer ear and eardrum with an otoscope. The otoscope is a lighted tool that lets your provider see inside the ear. A pneumatic otoscope blows a puff of air into the ear to check how well your eardrum  moves. If you eardrum doesn t move well, it may mean you have fluid behind it.  Your provider may also do a test called tympanometry. This test tells how well the middle ear is working. It can find any changes in pressure in the middle ear. Your provider may test your hearing with a tuning fork.  How is a middle-ear infection treated?  A middle-ear infection may be treated with:    Antibiotics, taken by mouth or as ear drops    Medication for pain    Decongestants, antihistamines, or nasal steroids  Your health care provider may also have you try autoinsufflation. This helps adjust the air pressure in your ear. For this, you pinch your nose and gently exhale. This forces air back through the eustachian tube.  The exact treatment for your ear infection will depend on the type of infection you have. In general, if your symptoms don t get better in 48 to 72 hours, contact your health care provider.  Middle-ear infections can cause long-term problems if not treated. They can lead to:    Infection in other parts of the head    Permanent hearing loss    Paralysis of a nerve in your face  If you have a middle-ear infection that doesn t get better, you may need to see an ear, nose, and throat specialist (otolaryngologist). You may need a CT scan or MRI to check for head and neck cancer.  Ear tubes  Sometimes fluid stays in the middle ear even after you take antibiotics and the infection goes away. In this case, your health care provider may suggest that a small tube be placed in your ear. The tube is put at the opening of the eardrum. The tube keeps fluid from building up and relieves pressure in the middle ear. It can also help you hear better. This surgery is called myringotomy. It is not often done in adults.  The tubes usually fall out on their own after 6 months to a year.    [id number] [employer]. [address] [place] [address], [place] [address]. All rights reserved. This information is not intended as a  substitute for professional medical care. Always follow your healthcare professional's instructions.           Patient Education     Reducing the Risk of Middle Ear Infections     Good handwashing can help your child prevent ear infections.     Most children have had at least one middle ear infection by the age of 2. Treatment may depend on whether the problem is acute or chronic. It also depends on how often it comes back and how long it lasts.  Reducing risk factors  Some behaviors or surroundings increase your child s risk of ear infection. Reducing such risk factors can be helpful at any point in treatment. The tips below may help:    If your child goes to group , he or she runs a greater risk of getting colds or flu. This may then lead to an ear infection. Help prevent these illnesses by teaching your child to wash his or her hands often.    If your child has nasal allergies, do your best to control dust, mold, mildew, and pet hair in the house. Also stop or greatly limit your child s contact with secondhand smoke.    If food allergies are a problem, identify the food that triggers the reaction. Help your child avoid it.  Watching and waiting  Sometimes it is better to proceed with caution in the following ways:    If your child is diagnosed with an ear infection, the healthcare provider may prescribe antibiotics and will suggest a period of  watchful waiting.  This means not filling any prescriptions right away. Instead of antibiotics, a trial of medicines to relieve symptoms including those for pain or fever, is advised. This is along with waiting some time to see if a child improves without antibiotic therapy. Whether or not your healthcare provider prescribes immediate antibiotics or a period of watchful waiting depends on your child's age and risk factors.    During this time, your child should be watched to see if his or her symptoms are improving and to make sure new symptoms, such as fever or  vomiting, don't develop. If a child doesn't improve within a few days or develops new symptoms, antibiotics will usually be started.    Date Last Reviewed: 12/1/2016 2000-2019 [employer]. [address] [place] [address], [place] [address]. All rights reserved. This information is not intended as a substitute for professional medical care. Always follow your healthcare professional's instructions.

## 2020-07-23 ENCOUNTER — NURSE TRIAGE (OUTPATIENT)
Dept: NURSING | Facility: CLINIC | Age: 35
End: 2020-07-23

## 2020-07-24 ENCOUNTER — MYC MEDICAL ADVICE (OUTPATIENT)
Dept: FAMILY MEDICINE | Facility: CLINIC | Age: 35
End: 2020-07-24

## 2020-07-24 NOTE — TELEPHONE ENCOUNTER
"S: Bilateral ear pain.  B: 7/22 went to urgent care for bilateral ear pain. DX with otitis media. Was given amoxicillin and ofloxacin ear drops.   This evening calling because of pain rates it a \"8-9\". Has been taking Motrin and it is not helping. Asking for something stronger.  Explained to Marylou she would have to be assessed by a provided in order to get a narcotic pain medication prescribed.  A: Advised to call to PCP on Friday.  Can start to add Tylenol to the Motrin to see if this will help with ear pain.  R: Pt verbalized understanding, in agreement with plan, and voiced no further questions.  Guillermina Fierro RN, [facility]    COVID 19 Nurse Triage Plan/Patient Instructions    Please be aware that novel coronavirus (COVID-19) may be circulating in the community. If you develop symptoms such as fever, cough, or SOB or if you have concerns about the presence of another infection including coronavirus (COVID-19), please contact your health care provider or visit [email or web address].     Disposition/Instructions    Home care recommended. Follow home care protocol based instructions.    Thank you for taking steps to prevent the spread of this virus.  o Limit your contact with others.  o Wear a simple mask to cover your cough.  o Wash your hands well and often.    Resources    [facility]: About COVID-19: [email or web address]    CDC: What to Do If You're Sick: www.cdc.gov/coronavirus/2019-ncov/about/steps-when-sick.html    CDC: Ending Home Isolation: www.cdc.gov/coronavirus/2019-ncov/hcp/disposition-in-home-patients.html     CDC: Caring for Someone: www.cdc.gov/coronavirus/2019-ncov/if-you-are-sick/care-for-someone.html     [facility]: Interim Guidance for Hospital Discharge to Home: www.health.[email or web address].[place].us/diseases/coronavirus/hcp/hospdischarge.pdf    [facility] clinical trials (COVID-19 research studies): [email or web address]/umn-[email or web address]-trials     Below are the COVID-19 hotlines " at the [place] [facility] ([facility]). Interpreters are available.   o For health questions: Call 413-288-2181 or 1-847.961.2081 (7 a.m. to 7 p.m.)  o For questions about schools and childcare: Call 684-358-1623 or 1-144.802.2898 (7 a.m. to 7 p.m.)                     Additional Information    [1] Recently diagnosed with otitis media AND [2] currently taking oral antibiotics (pills)    Negative: [1] Stiff neck (unable to touch chin to chest) AND [2] fever    Negative: [1] Bony area of skull behind the ear is pink or swollen AND [2] fever    Negative: Fever > 104 F (40 C)    Negative: Patient sounds very sick or weak to the triager    Negative: [1] SEVERE pain and [2] not improved 2 hours after analgesic medication (e.g., ibuprofen or acetaminophen)    Negative: Walking is very unsteady or dizziness    Negative: [1] Vomited AND [2] 3 or more times    Negative: [1] Taking antibiotic > 72 hours (3 days) and [2] pain persists or recurs    Negative: [1] Taking antibiotic > 48 hours (2 days) and [2] fever persists or recurs    Negative: Yellow or cloudy discharge from ear canal    [1] Taking antibiotic < 72 hours (3 days) and [2] pain persists    Protocols used: EAR - OTITIS EXTERNA FOLLOW-UP CALL-A-, EAR - OTITIS MEDIA FOLLOW-UP CALL-A-

## 2020-07-24 NOTE — TELEPHONE ENCOUNTER
E-visit/Virtual visit/Telephone visit instructions given to Marylou to further discuss stomach issues.     Nargis Pope RN, BSN, PHN

## 2020-07-25 ENCOUNTER — OFFICE VISIT (OUTPATIENT)
Dept: URGENT CARE | Facility: URGENT CARE | Age: 35
End: 2020-07-25

## 2020-07-25 VITALS
OXYGEN SATURATION: 97 % | WEIGHT: 208.6 LBS | BODY MASS INDEX: 36.95 KG/M2 | HEART RATE: 95 BPM | SYSTOLIC BLOOD PRESSURE: 130 MMHG | DIASTOLIC BLOOD PRESSURE: 93 MMHG | TEMPERATURE: 98.5 F

## 2020-07-25 DIAGNOSIS — H60.333 ACUTE SWIMMER'S EAR OF BOTH SIDES: ICD-10-CM

## 2020-07-25 DIAGNOSIS — H65.93 BILATERAL NON-SUPPURATIVE OTITIS MEDIA: Primary | ICD-10-CM

## 2020-07-25 DIAGNOSIS — D84.9 IMMUNOSUPPRESSION (H): ICD-10-CM

## 2020-07-25 PROCEDURE — 99213 OFFICE O/P EST LOW 20 MIN: CPT | Performed by: FAMILY MEDICINE

## 2020-07-25 RX ORDER — OFLOXACIN 3 MG/ML
5 SOLUTION AURICULAR (OTIC) DAILY
Qty: 2 ML | Refills: 0 | Status: SHIPPED | OUTPATIENT
Start: 2020-07-25 | End: 2024-02-14

## 2020-07-25 RX ORDER — CEFDINIR 300 MG/1
300 CAPSULE ORAL 2 TIMES DAILY
Qty: 20 CAPSULE | Refills: 0 | Status: SHIPPED | OUTPATIENT
Start: 2020-07-25 | End: 2024-02-14

## 2020-07-25 NOTE — PROGRESS NOTES
"SUBJECTIVE:  Chief Complaint   Patient presents with     RECHECK     Patient is here for follow up on ear infection      Marylou Mortensen is a 35 year old female who presents with bilateral ear pain, fullness and pressure for 5 day(s).   Severity: moderate   Timing:sudden onset, still present and constant  Additional symptoms include jaw pain bilateral, fever.      History of recurrent otitis: occasional    Patient immunosuppressed due to taking Azothioprine and Infliximab for Crohn's disease-  She has not changed her treatment schedule    Past Medical History:   Diagnosis Date     Anemia      Arthritis      Crohn's disease (H)      GERD (gastroesophageal reflux disease)     Occasional symptoms, does not need to take medication regularly.       H/O Elevated liver enzymes     Patient was told it was due to her carb-heavy diet, and \"pre-diabetes\"     H/O tension headache      Immune disorder (H)      PONV (postoperative nausea and vomiting)      Prediabetes      RA (rheumatoid arthritis) (H)      Reduced vision      Patient Active Problem List   Diagnosis     Crohn's disease of both small and large intestine without complication (H)     Mass of right thigh     Intramuscular lipoma       ALLERGIES:  Patient has no known allergies.    MEDs  amoxicillin (AMOXIL) 500 MG capsule, Take 1 capsule (500 mg) by mouth 3 times daily for 7 days  amoxicillin (AMOXIL) 500 MG capsule, Take 1 capsule (500 mg) by mouth 3 times daily for 7 days  AZATHIOPRINE PO, Take by mouth daily   IBUPROFEN PO, Take by mouth every 8 hours as needed for moderate pain  InFLIXimab (REMICADE IV), Every 6-8 weeks  ofloxacin (FLOXIN) 0.3 % otic solution, Place 5 drops into both ears daily for 7 days  VITAMIN D, CHOLECALCIFEROL, PO, Take 2,000 Units by mouth daily  benzonatate (TESSALON) 200 MG capsule, Take 1 capsule (200 mg) by mouth 3 times daily as needed for cough (Patient not taking: Reported on 12/20/2019)    No current facility-administered " medications on file prior to visit.       Social History     Tobacco Use     Smoking status: Never Smoker     Smokeless tobacco: Never Used   Substance Use Topics     Alcohol use: Yes     Comment: Rarely, maybe one drink once a month.         Family History   Problem Relation Age of Onset     Diabetes Father      Obesity Father      Breast Cancer Father      Coronary Artery Disease Father      Bone Cancer Father      Coronary Artery Disease Maternal Grandmother 50     Cardiac Sudden Death Maternal Grandmother      Breast Cancer Maternal Grandmother      Bone Cancer Maternal Grandmother      Breast Cancer Paternal Grandmother      Cancer Paternal Grandmother      Obesity Paternal Grandmother      Bone Cancer Paternal Grandmother      Tumor Mother         Multiple tumors over the body     Heart Disease Mother      Crohn's Disease Mother      Osteoporosis Mother      Migraines Mother      Bone Cancer Mother      Mental Illness Mother      Depression Mother      Coronary Artery Disease Mother      Celiac Disease Brother      Cancer Maternal Grandfather      No Known Problems Brother      Medical History Unknown Paternal Grandfather          ROS:   CONSTITUTIONAL:  fever, chills,   INTEGUMENTARY/SKIN: NEGATIVE for worrisome rashes,   or lesions  EYES: NEGATIVE for vision changes or irritation  RESP:NEGATIVE for significant cough or SOB    OBJECTIVE:  BP (!) 130/93 (BP Location: Left arm, Patient Position: Chair, Cuff Size: Adult Regular)   Pulse 95   Temp 98.5  F (36.9  C) (Oral)   Wt 94.6 kg (208 lb 9.6 oz)   SpO2 97%   BMI 36.95 kg/m     EXAM:  The right TM is not visualized secondary to drainage   Purulence  The right auditory canal is obstructed by drainage/ purulence  The left TM is not visualized secondary to drainage/ purulence  The left auditory canal is obstructed by drainage/ purulence  Tenderness anterior to tragus and mastoid region bilateral  Oropharynx exam is normal: no lesions, erythema, adenopathy  or exudate.    GENERAL: alert and moderate distress, cooperative-  Needs loud speech due to obstructed hearing  EYES: EOMI,  PERRL, conjunctiva clear  NECK: supple, non-tender to palpation, no adenopathy noted  RESP: lungs clear to auscultation - no rales, rhonchi or wheezes  CV: regular rates and rhythm, normal S1 S2, no murmur noted  SKIN: no suspicious lesions or rashes     ASSESSMENT/ PLAN  Acute swimmer's ear of both sides     - ofloxacin (FLOXIN) 0.3 % otic solution; Place 5 drops into both ears daily  - OTOLARYNGOLOGY REFERRAL    Bilateral non-suppurative otitis media     - cefdinir (OMNICEF) 300 MG capsule; Take 1 capsule (300 mg) by mouth 2 times daily  - OTOLARYNGOLOGY REFERRAL    Will treat with stronger antibiotic for otitis media, but due to persistent symptoms, not improving with amoxicillin and reported pain in mastoid, will have patient follow-up with ENT for ongoing management       Symptomatic relief of pain and fever with acetaminophen and/or ibuprofen   May apply a warm pack to the region of the ear for symptomatic relief    Immunosuppression (H)     Discussed how her immunosuppressive medications limit the ability to heal the ear infections

## 2020-07-25 NOTE — PATIENT INSTRUCTIONS
Patient Education     Middle Ear Infection (Adult)  You have an infection of the middle ear, the space behind the eardrum. This is also called acute otitis media (AOM). Sometimes it is caused by the common cold. This is because congestion can block the internal passage (eustachian tube) that drains fluid from the middle ear. When the middle ear fills with fluid, bacteria can grow there and cause an infection. Oral antibiotics are used to treat this illness, not ear drops. Symptoms usually start to improve within 1 to 2 days of treatment.    Home care  The following are general care guidelines:    Finish all of the antibiotic medicine given, even though you may feel better after the first few days.    You may use over-the-counter medicine, such as acetaminophen or ibuprofen, to control pain and fever, unless something else was prescribed. If you have chronic liver or kidney disease or have ever had a stomach ulcer or gastrointestinal bleeding, talk with your healthcare provider before using these medicines. Do not give aspirin to anyone under 18 years of age who has a fever. It may cause severe illness or death.  Follow-up care  Follow up with your healthcare provider, or as advised, in 2 weeks if all symptoms have not gotten better, or if hearing doesn't go back to normal within 1 month.  When to seek medical advice  Call your healthcare provider right away if any of these occur:    Ear pain gets worse or does not improve after 3 days of treatment    Unusual drowsiness or confusion    Neck pain, stiff neck, or headache    Fluid or blood draining from the ear canal    Fever of 100.4 F (38 C) or as advised     Seizure  Date Last Reviewed: 6/1/2016 2000-2019 [employer]. [address] [place] [address], [place] [address]. All rights reserved. This information is not intended as a substitute for professional medical care. Always follow your healthcare professional's instructions.

## 2020-08-06 NOTE — PROGRESS NOTES
History of Present Illness - Marylou Mortensen is a 35 year old female here to see me at the consult of [facility] for the first time due to possible swimmer's ear.    About one and a half years ago she started to get recurrent clear drainage from the ear canals bilaterally.  It wasn't bloody or painful, but very persistent.    Then about 2 weeks ago things got acutely worse.  Bilaterally the canals became very painful and swollen, to the point where she started getting fevers and chills.  She went to see her PCP and was placed on oral antibiotics and floxin.  The pain and fever improved, but her jaw actually started to shift and her teeth didn't seem to fit well together.  Her hearing was also muffled as well.  She was placed on another antibiotic and continued the Floxin.    Past Medical History -   Patient Active Problem List   Diagnosis     Crohn's disease of both small and large intestine without complication (H)     Mass of right thigh     Intramuscular lipoma       Current Medications -   Current Outpatient Medications:      AZATHIOPRINE PO, Take by mouth daily , Disp: , Rfl:      benzonatate (TESSALON) 200 MG capsule, Take 1 capsule (200 mg) by mouth 3 times daily as needed for cough (Patient not taking: Reported on 12/20/2019), Disp: 20 capsule, Rfl: 0     cefdinir (OMNICEF) 300 MG capsule, Take 1 capsule (300 mg) by mouth 2 times daily, Disp: 20 capsule, Rfl: 0     IBUPROFEN PO, Take by mouth every 8 hours as needed for moderate pain, Disp: , Rfl:      InFLIXimab (REMICADE IV), Every 6-8 weeks, Disp: , Rfl:      ofloxacin (FLOXIN) 0.3 % otic solution, Place 5 drops into both ears daily, Disp: 2 mL, Rfl: 0     VITAMIN D, CHOLECALCIFEROL, PO, Take 2,000 Units by mouth daily, Disp: , Rfl:     Allergies - No Known Allergies    Social History -   Social History     Socioeconomic History     Marital status: Single     Spouse name: Not on file     Number of children: Not on file     Years of education: Not  on file     Highest education level: Not on file   Occupational History     Occupation: student     Employer: [employer]   Social Needs     Financial resource strain: Not on file     Food insecurity     Worry: Not on file     Inability: Not on file     Transportation needs     Medical: Not on file     Non-medical: Not on file   Tobacco Use     Smoking status: Never Smoker     Smokeless tobacco: Never Used   Substance and Sexual Activity     Alcohol use: Yes     Comment: Rarely, maybe one drink once a month.       Drug use: No     Sexual activity: Not Currently     Partners: Male     Birth control/protection: None   Lifestyle     Physical activity     Days per week: Not on file     Minutes per session: Not on file     Stress: Not on file   Relationships     Social connections     Talks on phone: Not on file     Gets together: Not on file     Attends Episcopalian service: Not on file     Active member of club or organization: Not on file     Attends meetings of clubs or organizations: Not on file     Relationship status: Not on file     Intimate partner violence     Fear of current or ex partner: Not on file     Emotionally abused: Not on file     Physically abused: Not on file     Forced sexual activity: Not on file   Other Topics Concern     Parent/sibling w/ CABG, MI or angioplasty before 65F 55M? Not Asked   Social History Narrative     Not on file       Family History -   Family History   Problem Relation Age of Onset     Diabetes Father      Obesity Father      Breast Cancer Father      Coronary Artery Disease Father      Bone Cancer Father      Coronary Artery Disease Maternal Grandmother 50     Cardiac Sudden Death Maternal Grandmother      Breast Cancer Maternal Grandmother      Bone Cancer Maternal Grandmother      Breast Cancer Paternal Grandmother      Cancer Paternal Grandmother      Obesity Paternal Grandmother      Bone Cancer Paternal Grandmother      Tumor Mother         Multiple tumors over the body      Heart Disease Mother      Crohn's Disease Mother      Osteoporosis Mother      Migraines Mother      Bone Cancer Mother      Mental Illness Mother      Depression Mother      Coronary Artery Disease Mother      Celiac Disease Brother      Cancer Maternal Grandfather      No Known Problems Brother      Medical History Unknown Paternal Grandfather        Review of Systems - As per HPI and PMHx, otherwise 10+ system review of the head and neck, and general constitution is negative.    Physical Exam  BP (!) 141/98   Pulse 94   Temp 98.7  F (37.1  C) (Oral)   Wt 98.7 kg (217 lb 9.6 oz)   SpO2 95%   BMI 38.55 kg/m      General - The patient is well nourished and well developed, and appears to have good nutritional status.  Alert and oriented to person and place, answers questions and cooperates with examination appropriately.   Head and Face - Normocephalic and atraumatic, with no gross asymmetry noted of the contour of the facial features.  The facial nerve is intact, with strong symmetric movements.  Voice and Breathing - The patient was breathing comfortably without the use of accessory muscles. There was no wheezing, stridor, or stertor.  The patients voice was clear and strong, and had appropriate pitch and quality.  Ears - bilaterally the canals are red, excoriated, and have moist scattered purulent squamous debris.  The tympanic membrane's are intact bilaterally, no perforation or retraction.  Eyes - Extraocular movements intact, and the pupils were reactive to light.  Sclera were not icteric or injected, conjunctiva were pink and moist.      A/P - Marylou Mortensen is a 35 year old female  (H60.543) Acute eczematoid otitis externa of both ears  (primary encounter diagnosis)    Based on history and exam, a classic case of chronic eczematous otitis externa that has developed an acute secondary otitis externa due to the excess moisture.    I will change her floxin to ciprodex for a week.  But in addition,  will place her on long term pred forte drops for long term maintenance.

## 2020-08-07 ENCOUNTER — OFFICE VISIT (OUTPATIENT)
Dept: OTOLARYNGOLOGY | Facility: CLINIC | Age: 35
End: 2020-08-07

## 2020-08-07 VITALS
HEART RATE: 94 BPM | WEIGHT: 217.6 LBS | TEMPERATURE: 98.7 F | SYSTOLIC BLOOD PRESSURE: 141 MMHG | DIASTOLIC BLOOD PRESSURE: 98 MMHG | BODY MASS INDEX: 38.55 KG/M2 | OXYGEN SATURATION: 95 %

## 2020-08-07 DIAGNOSIS — H60.543 ACUTE ECZEMATOID OTITIS EXTERNA OF BOTH EARS: Primary | ICD-10-CM

## 2020-08-07 PROCEDURE — 99203 OFFICE O/P NEW LOW 30 MIN: CPT | Performed by: OTOLARYNGOLOGY

## 2020-08-07 RX ORDER — PREDNISOLONE ACETATE 10 MG/ML
4 SUSPENSION/ DROPS OPHTHALMIC EVERY OTHER DAY
Qty: 1 BOTTLE | Refills: 6 | Status: SHIPPED | OUTPATIENT
Start: 2020-08-07 | End: 2024-02-14

## 2020-08-07 NOTE — LETTER
8/7/2020         RE: Marylou Mortensen  [address]  [facility] [address]        Dear Colleague,    Thank you for referring your patient, Marylou Mortensen, to the [facility]. Please see a copy of my visit note below.    History of Present Illness - Marylou Mortensen is a 35 year old female here to see me at the consult of [facility] for the first time due to possible swimmer's ear.    About one and a half years ago she started to get recurrent clear drainage from the ear canals bilaterally.  It wasn't bloody or painful, but very persistent.    Then about 2 weeks ago things got acutely worse.  Bilaterally the canals became very painful and swollen, to the point where she started getting fevers and chills.  She went to see her PCP and was placed on oral antibiotics and floxin.  The pain and fever improved, but her jaw actually started to shift and her teeth didn't seem to fit well together.  Her hearing was also muffled as well.  She was placed on another antibiotic and continued the Floxin.    Past Medical History -   Patient Active Problem List   Diagnosis     Crohn's disease of both small and large intestine without complication (H)     Mass of right thigh     Intramuscular lipoma       Current Medications -   Current Outpatient Medications:      AZATHIOPRINE PO, Take by mouth daily , Disp: , Rfl:      benzonatate (TESSALON) 200 MG capsule, Take 1 capsule (200 mg) by mouth 3 times daily as needed for cough (Patient not taking: Reported on 12/20/2019), Disp: 20 capsule, Rfl: 0     cefdinir (OMNICEF) 300 MG capsule, Take 1 capsule (300 mg) by mouth 2 times daily, Disp: 20 capsule, Rfl: 0     IBUPROFEN PO, Take by mouth every 8 hours as needed for moderate pain, Disp: , Rfl:      InFLIXimab (REMICADE IV), Every 6-8 weeks, Disp: , Rfl:      ofloxacin (FLOXIN) 0.3 % otic solution, Place 5 drops into both ears daily, Disp: 2 mL, Rfl: 0     VITAMIN D, CHOLECALCIFEROL, PO, Take  2,000 Units by mouth daily, Disp: , Rfl:     Allergies - No Known Allergies    Social History -   Social History     Socioeconomic History     Marital status: Single     Spouse name: Not on file     Number of children: Not on file     Years of education: Not on file     Highest education level: Not on file   Occupational History     Occupation: student     Employer: [employer]   Social Needs     Financial resource strain: Not on file     Food insecurity     Worry: Not on file     Inability: Not on file     Transportation needs     Medical: Not on file     Non-medical: Not on file   Tobacco Use     Smoking status: Never Smoker     Smokeless tobacco: Never Used   Substance and Sexual Activity     Alcohol use: Yes     Comment: Rarely, maybe one drink once a month.       Drug use: No     Sexual activity: Not Currently     Partners: Male     Birth control/protection: None   Lifestyle     Physical activity     Days per week: Not on file     Minutes per session: Not on file     Stress: Not on file   Relationships     Social connections     Talks on phone: Not on file     Gets together: Not on file     Attends Anabaptist service: Not on file     Active member of club or organization: Not on file     Attends meetings of clubs or organizations: Not on file     Relationship status: Not on file     Intimate partner violence     Fear of current or ex partner: Not on file     Emotionally abused: Not on file     Physically abused: Not on file     Forced sexual activity: Not on file   Other Topics Concern     Parent/sibling w/ CABG, MI or angioplasty before 65F 55M? Not Asked   Social History Narrative     Not on file       Family History -   Family History   Problem Relation Age of Onset     Diabetes Father      Obesity Father      Breast Cancer Father      Coronary Artery Disease Father      Bone Cancer Father      Coronary Artery Disease Maternal Grandmother 50     Cardiac Sudden Death Maternal Grandmother      Breast Cancer  Maternal Grandmother      Bone Cancer Maternal Grandmother      Breast Cancer Paternal Grandmother      Cancer Paternal Grandmother      Obesity Paternal Grandmother      Bone Cancer Paternal Grandmother      Tumor Mother         Multiple tumors over the body     Heart Disease Mother      Crohn's Disease Mother      Osteoporosis Mother      Migraines Mother      Bone Cancer Mother      Mental Illness Mother      Depression Mother      Coronary Artery Disease Mother      Celiac Disease Brother      Cancer Maternal Grandfather      No Known Problems Brother      Medical History Unknown Paternal Grandfather        Review of Systems - As per HPI and PMHx, otherwise 10+ system review of the head and neck, and general constitution is negative.    Physical Exam  BP (!) 141/98   Pulse 94   Temp 98.7  F (37.1  C) (Oral)   Wt 98.7 kg (217 lb 9.6 oz)   SpO2 95%   BMI 38.55 kg/m      General - The patient is well nourished and well developed, and appears to have good nutritional status.  Alert and oriented to person and place, answers questions and cooperates with examination appropriately.   Head and Face - Normocephalic and atraumatic, with no gross asymmetry noted of the contour of the facial features.  The facial nerve is intact, with strong symmetric movements.  Voice and Breathing - The patient was breathing comfortably without the use of accessory muscles. There was no wheezing, stridor, or stertor.  The patients voice was clear and strong, and had appropriate pitch and quality.  Ears - bilaterally the canals are red, excoriated, and have moist scattered purulent squamous debris.  The tympanic membrane's are intact bilaterally, no perforation or retraction.  Eyes - Extraocular movements intact, and the pupils were reactive to light.  Sclera were not icteric or injected, conjunctiva were pink and moist.      A/P - Marylou Mortensen is a 35 year old female  (H60.543) Acute eczematoid otitis externa of both ears   (primary encounter diagnosis)    Based on history and exam, a classic case of chronic eczematous otitis externa that has developed an acute secondary otitis externa due to the excess moisture.    I will change her floxin to ciprodex for a week.  But in addition, will place her on long term pred forte drops for long term maintenance.          Again, thank you for allowing me to participate in the care of your patient.        Sincerely,        John Hardin MD

## 2020-11-14 ENCOUNTER — HEALTH MAINTENANCE LETTER (OUTPATIENT)
Age: 35
End: 2020-11-14

## 2021-09-12 ENCOUNTER — HEALTH MAINTENANCE LETTER (OUTPATIENT)
Age: 36
End: 2021-09-12

## 2022-01-02 ENCOUNTER — HEALTH MAINTENANCE LETTER (OUTPATIENT)
Age: 37
End: 2022-01-02

## 2022-11-19 ENCOUNTER — HEALTH MAINTENANCE LETTER (OUTPATIENT)
Age: 37
End: 2022-11-19

## 2023-04-09 ENCOUNTER — HEALTH MAINTENANCE LETTER (OUTPATIENT)
Age: 38
End: 2023-04-09

## 2024-02-12 DIAGNOSIS — R22.41 MASS OF RIGHT THIGH: Primary | ICD-10-CM

## 2024-02-14 ENCOUNTER — OFFICE VISIT (OUTPATIENT)
Dept: FAMILY MEDICINE | Facility: CLINIC | Age: 39
End: 2024-02-14
Payer: COMMERCIAL

## 2024-02-14 VITALS
HEIGHT: 63 IN | WEIGHT: 219 LBS | SYSTOLIC BLOOD PRESSURE: 128 MMHG | DIASTOLIC BLOOD PRESSURE: 85 MMHG | OXYGEN SATURATION: 95 % | TEMPERATURE: 97.8 F | BODY MASS INDEX: 38.8 KG/M2 | HEART RATE: 63 BPM | RESPIRATION RATE: 16 BRPM

## 2024-02-14 DIAGNOSIS — H60.393 INFECTIVE OTITIS EXTERNA, BILATERAL: Primary | ICD-10-CM

## 2024-02-14 PROCEDURE — 99203 OFFICE O/P NEW LOW 30 MIN: CPT | Performed by: PHYSICIAN ASSISTANT

## 2024-02-14 RX ORDER — CIPROFLOXACIN AND DEXAMETHASONE 3; 1 MG/ML; MG/ML
4 SUSPENSION/ DROPS AURICULAR (OTIC) 2 TIMES DAILY
Qty: 7.5 ML | Refills: 0 | Status: SHIPPED | OUTPATIENT
Start: 2024-02-14 | End: 2024-02-21

## 2024-02-14 ASSESSMENT — PAIN SCALES - GENERAL: PAINLEVEL: NO PAIN (0)

## 2024-02-14 NOTE — PROGRESS NOTES
"Assessment & Plan     Infective otitis externa, bilateral  Start Augmentin 875-125 mg twice daily for 10 days in addition to Cipro/dexamethasone eardrops.  Encouraged her to contact office if prescription is very expensive.  Request alternative if this is the case.  Add Flonase for ear popping in addition to above treatments.  Otherwise she appears well.  No acute distress.  Did recommend re-evaluation with ENT given how this is seemingly a chronic issue.  Provided her the number to call and schedule.    - amoxicillin-clavulanate (AUGMENTIN) 875-125 MG tablet  Dispense: 20 tablet; Refill: 0  - ciprofloxacin-dexAMETHasone (CIPRODEX) 0.3-0.1 % otic suspension  Dispense: 7.5 mL; Refill: 0  - Adult ENT  Referral      20 minutes spent by me on the date of the encounter on chart review, review of test results, interpretation of tests, patient visit, and documentation       BMI  Estimated body mass index is 38.79 kg/m  as calculated from the following:    Height as of this encounter: 1.6 m (5' 3\").    Weight as of this encounter: 99.3 kg (219 lb).         No follow-ups on file.    The likelihood of other entities in the differential is insufficient to justify any further testing for them at this time. This was explained to the patient. The patient was advised that persistent or worsening symptoms would require further evaluation. Patient advised to call the office and if unable to reach to go to the emergency room if they develop any new or worsening symptoms. Expressed understanding and agreement with above stated plan.     MARIVEL [facility] CORINE    Cat Hickman JEM Mortensen is a pleasant 38 year old female presenting for the following health issues:  Patient presents with:  Ear Problem  Health Maintenance: Pap -- never done     Here today for evaluation of bilateral ear fluid/popping.  This is seemingly a chronic issue for her.  Diagnosed with double ear infection in " "2020.  Off and on.  However mostly persistent since then.  Notes popping sensation in bilateral ears when laying back nightly.  Use hydrogen peroxide as well as water to help clean your ears however will get drainage despite this.  No fever, chills, sweats.  No recent illness.  However symptoms have worsened over the past few months.  Saw ENT in 2020.    Follows with [facility] for history of Crohn's.  Additionally, lipoma removal from right thigh.  No antibiotic allergies    Review of Systems   Constitutional, HEENT, cardiovascular, pulmonary, GI, , musculoskeletal, neuro, skin, endocrine and psych systems are negative, except as otherwise noted.      Objective    /85 (BP Location: Left arm, Patient Position: Chair, Cuff Size: Adult Large)   Pulse 63   Temp 97.8  F (36.6  C) (Temporal)   Resp 16   Ht 1.6 m (5' 3\")   Wt 99.3 kg (219 lb)   LMP 02/08/2024   SpO2 95%   BMI 38.79 kg/m    5' 3\"  219 lbs 0 oz    Physical Exam   GENERAL: healthy, alert and no distress  EYES: Eyes grossly normal to inspection, PERRL and conjunctivae and sclerae normal  HENT: Right ear canal narrowed with mild erythema.  No concerning lesions however small amount of purulence distally.  TM intact.  Outer dermatitis.  Left ear canal narrowed to a lesser extent however purulence more prominent near TM.  No additional concerning lesions.  Nose and mouth without ulcers or lesions  NECK: no adenopathy, no asymmetry, masses, or scars and thyroid normal to palpation  RESP: lungs clear to auscultation - no rales, rhonchi or wheezes  CV: regular rate and rhythm, normal S1 S2, no S3 or S4, no murmur, click or rub, no peripheral edema  MS: no gross musculoskeletal defects noted, no edema  SKIN: no suspicious lesions or rashes  NEURO: Normal strength and tone, mentation intact and speech normal  PSYCH: mentation appears normal, affect normal/bright      Answers submitted by the patient for this visit:  General Questionnaire (Submitted on " 2/8/2024)  Chief Complaint: Chronic problems general questions HPI Form  What is the reason for your visit today? : persistent ear infection  How many servings of fruits and vegetables do you eat daily?: 2-3  On average, how many sweetened beverages do you drink each day (Examples: soda, juice, sweet tea, etc.  Do NOT count diet or artificially sweetened beverages)?: 1  How many minutes a day do you exercise enough to make your heart beat faster?: 9 or less  How many days a week do you exercise enough to make your heart beat faster?: 3 or less  How many days per week do you miss taking your medication?: 0

## 2024-02-23 ENCOUNTER — HOSPITAL ENCOUNTER (OUTPATIENT)
Dept: MRI IMAGING | Facility: CLINIC | Age: 39
Discharge: HOME | End: 2024-02-23
Attending: ORTHOPAEDIC SURGERY | Admitting: ORTHOPAEDIC SURGERY
Payer: COMMERCIAL

## 2024-02-23 DIAGNOSIS — R22.41 MASS OF RIGHT THIGH: ICD-10-CM

## 2024-02-23 PROCEDURE — A9585 GADOBUTROL INJECTION: HCPCS | Performed by: ORTHOPAEDIC SURGERY

## 2024-02-23 PROCEDURE — 255N000002 HC RX 255 OP 636: Performed by: ORTHOPAEDIC SURGERY

## 2024-02-23 PROCEDURE — 73720 MRI LWR EXTREMITY W/O&W/DYE: CPT | Mod: 26 | Performed by: RADIOLOGY

## 2024-02-23 PROCEDURE — 73720 MRI LWR EXTREMITY W/O&W/DYE: CPT | Mod: RT

## 2024-02-23 RX ORDER — GADOBUTROL 604.72 MG/ML
10 INJECTION INTRAVENOUS ONCE
Status: COMPLETED | OUTPATIENT
Start: 2024-02-23 | End: 2024-02-23

## 2024-02-23 RX ADMIN — GADOBUTROL 10 ML: 604.72 INJECTION INTRAVENOUS at 21:03

## 2024-02-26 NOTE — TELEPHONE ENCOUNTER
DIAGNOSIS: MASS OF RT THIGH   APPOINTMENT DATE: 02/29/2024   NOTES STATUS DETAILS   OFFICE NOTE from referring provider SELF 02/10/2024 - My C Message   OFFICE NOTE from other specialist Internal Last Seen:  12/20/2019 - Dylon Mendez MD- [facility]   OPERATIVE REPORT Internal 07/26/2018 - RT Thigh Tumor Removal  05/31/2018 - Biopsy RT Thigh Tumor   (IMAGES & REPORTS) Internal    TUMOR     PATHOLOGY  Slides & report Internal Specimen #: [id number]  Collected: 7/26/2018    Specimen #: [id number]  Collected: 5/31/2018

## 2024-02-27 ENCOUNTER — MEDICAL CORRESPONDENCE (OUTPATIENT)
Dept: HEALTH INFORMATION MANAGEMENT | Facility: CLINIC | Age: 39
End: 2024-02-27
Payer: COMMERCIAL

## 2024-02-29 ENCOUNTER — OFFICE VISIT (OUTPATIENT)
Dept: ORTHOPEDICS | Facility: CLINIC | Age: 39
End: 2024-02-29
Payer: COMMERCIAL

## 2024-02-29 ENCOUNTER — PRE VISIT (OUTPATIENT)
Dept: ORTHOPEDICS | Facility: CLINIC | Age: 39
End: 2024-02-29

## 2024-02-29 DIAGNOSIS — D17.9 INTRAMUSCULAR LIPOMA: Primary | ICD-10-CM

## 2024-02-29 PROCEDURE — 99204 OFFICE O/P NEW MOD 45 MIN: CPT | Performed by: ORTHOPAEDIC SURGERY

## 2024-02-29 RX ORDER — PREDNISONE 10 MG/1
10 TABLET ORAL EVERY MORNING
COMMUNITY
Start: 2024-02-28 | End: 2024-06-27

## 2024-02-29 RX ORDER — BUDESONIDE 3 MG/1
CAPSULE, COATED PELLETS ORAL
COMMUNITY
Start: 2024-02-26 | End: 2024-03-12

## 2024-02-29 NOTE — NURSING NOTE
Pre-Op Teaching was done in person at the clinic.    Teaching Flowsheet   Relevant Diagnosis: Right thigh masses excision   Teaching Topic: Pre-Operative Teaching     Person(s) involved in teaching:   Patient     Motivation Level:  Asks Questions: Yes  Eager to Learn: Yes  Cooperative: Yes  Receptive (willing/able to accept information): Yes  Any cultural factors/Mormonism beliefs that may influence understanding or compliance? No     Patient demonstrates understanding of the following:  Reason for the appointment, diagnosis and treatment plan: Yes  Knowledge of proper use of medications and conditions for which they are ordered (with special attention to potential side effects or drug interactions): Yes  Which situations necessitate calling provider and whom to contact: Yes- discussed the stoplight tool to help assist with this.      Teaching Concerns Addressed:      Proper use of surgical scrub explain and provided to patient: Yes  Nutritional needs and diet plan: Yes  Pain management techniques: Yes  Wound Care: Yes  How and/when to access community resources: Yes     Instructional Materials Used/Given: surgical packet and surgical soap  received in clinic.       - Important contact info/ phone numbers  - Map/ location of surgery  - Showering instructions  - Stop light tool    Additionally the following was discussed with patient:  - Patient informed responsible adult is required to drive her home and stay with her for 24 hours after surgery.   - Authorization to Share Protected Health Information- Person to person communication signed by patient and sent to be scanned into chart. Patient authorized the following person or people:   Patient deferred to later visit       -Next step: Perioperative  to contact patient and schedule surgery/post ops., Patient to be evaluated by PAC.    Time spent with patient: 15 minutes.     Tara Holter, RNCC

## 2024-02-29 NOTE — NURSING NOTE
Chief Complaint   Patient presents with    Consult     Follow up right posterior thigh mass last seen 2019. Review recent MRI       38 year old  1985          Pain Assessment  Patient Currently in Pain: Yes  Primary Pain Location:  (all over the body)                [id number] - [place] - [address] [id number] - [place] - [address]        No Known Allergies        Current Outpatient Medications   Medication    budesonide (ENTOCORT EC) 3 MG EC capsule    predniSONE (DELTASONE) 10 MG tablet    IBUPROFEN PO     No current facility-administered medications for this visit.

## 2024-02-29 NOTE — LETTER
2/29/2024         RE: Marylou Mortensen  [address] [place] [id number]        Dear Colleague,    Thank you for referring your patient, Marylou Mortensen, to the [facility]. Please see a copy of my visit note below.    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    Plan: Surgical excision through 2 incisions as planned.  Case request form was submitted.    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  7/26/2018.    Treatment: Excision of the right medial thigh tumor with observation of the right posterior thigh tumor.  Patient has not been able to follow-up for various reasons.    Marylou is seen today for concerns about enlargement of the right medial thigh and right posterior thigh tumors.  Her treatments date back to May 2018 where a biopsy of the right medial thigh tumor was performed.  This revealed a atypical spindle cell lipomatous tumor.  It was known at the time of her July 2018 resection that the tumor extended through the intermuscular septum from the medial to the posterior compartments.  At that time it was discussed that it may not be possible to get the posterior compartment portion.  This in fact was true is a 70 cm mass was excised from the right medial thigh and amputated at the level of the intermuscular septum leaving the approximate several centimeter mass in the posterior thigh.    For variety of reasons Marylou was lost to follow-up and presents now with symptoms of enlargement.  She reports she is pleased with the results of her prior surgery but over the past several years has started to develop pain with sitting secondary to a mass in the posterior part of her thigh and also feels that there is a recurrence or enlargement of the mass in the medial aspect of  her thigh.    Of note she does have Crohn's disease and is currently on prednisone.  She has no history of DVT.    Review of her imaging shows that she now has an 11 cm mass in the medial portion of the thigh and a 5 cm mass in the posterior portion.  The signal characteristics are the same as previous when her tumor was diagnosed as a atypical spindle cell lipomatous tumor.    I discussed with Ms. silva that I would recommend we proceed with tumor removal.  She understands this to be through 2 incisions but performed at 1 time.  She understands risk and benefits of surgery and would like to proceed.    We will move forward as described above.    This is a new patient visit.  The total time spent on the day of clinic reviewing her past medical history and past imaging as well as during the face-to-face time with her reviewing her current imaging was greater than 45 minutes.        Dylon Mendez MD

## 2024-03-01 ENCOUNTER — TELEPHONE (OUTPATIENT)
Dept: ORTHOPEDICS | Facility: CLINIC | Age: 39
End: 2024-03-01
Payer: COMMERCIAL

## 2024-03-01 NOTE — PROGRESS NOTES
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    Plan: Surgical excision through 2 incisions as planned.  Case request form was submitted.    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  7/26/2018.    Treatment: Excision of the right medial thigh tumor with observation of the right posterior thigh tumor.  Patient has not been able to follow-up for various reasons.    Marylou is seen today for concerns about enlargement of the right medial thigh and right posterior thigh tumors.  Her treatments date back to May 2018 where a biopsy of the right medial thigh tumor was performed.  This revealed a atypical spindle cell lipomatous tumor.  It was known at the time of her July 2018 resection that the tumor extended through the intermuscular septum from the medial to the posterior compartments.  At that time it was discussed that it may not be possible to get the posterior compartment portion.  This in fact was true is a 70 cm mass was excised from the right medial thigh and amputated at the level of the intermuscular septum leaving the approximate several centimeter mass in the posterior thigh.    For variety of reasons Marylou was lost to follow-up and presents now with symptoms of enlargement.  She reports she is pleased with the results of her prior surgery but over the past several years has started to develop pain with sitting secondary to a mass in the posterior part of her thigh and also feels that there is a recurrence or enlargement of the mass in the medial aspect of her thigh.    Of note she does have Crohn's disease and is currently on prednisone.  She has no history of DVT.    Review of her imaging shows that she now has an 11 cm mass in the medial portion of the thigh and a 5 cm mass in the posterior portion.  The signal characteristics are  the same as previous when her tumor was diagnosed as a atypical spindle cell lipomatous tumor.    I discussed with Ms. silva that I would recommend we proceed with tumor removal.  She understands this to be through 2 incisions but performed at 1 time.  She understands risk and benefits of surgery and would like to proceed.    We will move forward as described above.    This is a new patient visit.  The total time spent on the day of clinic reviewing her past medical history and past imaging as well as during the face-to-face time with her reviewing her current imaging was greater than 45 minutes.

## 2024-03-01 NOTE — TELEPHONE ENCOUNTER
Phoned patient to get her schedule for surgery with Dr. Mendez.     Spoke with patient, stated that she would like to proceed with surgery date of 3/25/24 after declining 3/11/24.     Patient stated that she would like to verify with family, and if it works, she will call to get schedule with PAC+POP; or call back to look at other dates.     Patient was provided direct call back number of [phone]. No further questions or concerns.

## 2024-03-01 NOTE — TELEPHONE ENCOUNTER
Received call from patient confirming surgery date of 3/25/24. However, patient stated that she is taking different medications for her Chron's disease and has recently started a new treatment plan with her GI.     Patient is requesting for the provider or care team to call her and discuss if this will affect her surgery date.     Patient has also other medical questions that she would like to answer.    Will route to team for further assistance.     Patient will call back to [phone] after she speaks with care team and is okay proceeding with scheduling PAC+POP.     No further questions or concerns.

## 2024-03-01 NOTE — CONFIDENTIAL NOTE
Returned call to patient. Patient has a history of Crohns disease. She experienced a flare and her GI prescribed a Prednisone taper on 2/28 for 1 month. They are working on determining a long term plan for treatment. She is wondering if she will need to wait to have surgery until after she is off Predisone as it was briefly mentioned at her visit yesterday. Writer will clarify with provider and update patient.    Tara Holter, RNCC

## 2024-03-01 NOTE — PATIENT INSTRUCTIONS
Impression: Over the past 6 years recurrence of right medial thigh atypical lipomatous tumor and growth of right posterior thigh atypical lipomatous tumor.    Plan: Surgical excision through 2 incisions as planned.  Case request form was submitted.

## 2024-03-04 NOTE — TELEPHONE ENCOUNTER
Patient is scheduled for surgery with Dr. Mendez    Spoke with: Marylou    Date of Surgery: 3/25/24    Location: [facility]    Informed patient they will need an adult  Yes    Pre op with Provider PAC, 3/12/24     Additional imaging/appointments: POP Made    Surgery packet: Received     Additional comments: N/A        Ivette Hoffmann on 3/4/2024 at 12:57 PM

## 2024-03-04 NOTE — TELEPHONE ENCOUNTER
Phoned patient to get her schedule with PAC+post op for upcoming surgery with Dr. Mendez.     Call went to voicemail. Provided reason of call and call back number of [phone].    Will try again later.

## 2024-03-04 NOTE — TELEPHONE ENCOUNTER
FUTURE VISIT INFORMATION      SURGERY INFORMATION:  Date: 3/25/24  [facility]  Surgeon:  Dylon Mendez MD   Anesthesia Type:  general  Procedure: removal two right thigh tumors. two incisions   Consult: ov 2/29/24    RECORDS REQUESTED FROM:       Primary Care Provider: [facility]

## 2024-03-04 NOTE — TELEPHONE ENCOUNTER
- A call was placed to the patient.     - Let patient know that the prednisone taper would not affect surgery date per Sheela GRIFFITH, especially since she will be weaning over the month.     - Patient verbalized understanding of plan and all questions were answered. Call back number to clinic was given and patient was told to call if they had an further questions.

## 2024-03-07 ENCOUNTER — TRANSCRIBE ORDERS (OUTPATIENT)
Dept: OTHER | Age: 39
End: 2024-03-07

## 2024-03-07 DIAGNOSIS — M12.9 ARTHROPATHY, UNSPECIFIED: Primary | ICD-10-CM

## 2024-03-12 ENCOUNTER — LAB (OUTPATIENT)
Dept: LAB | Facility: CLINIC | Age: 39
End: 2024-03-12
Payer: COMMERCIAL

## 2024-03-12 ENCOUNTER — PRE VISIT (OUTPATIENT)
Dept: SURGERY | Facility: CLINIC | Age: 39
End: 2024-03-12

## 2024-03-12 ENCOUNTER — OFFICE VISIT (OUTPATIENT)
Dept: SURGERY | Facility: CLINIC | Age: 39
End: 2024-03-12
Payer: COMMERCIAL

## 2024-03-12 VITALS
OXYGEN SATURATION: 94 % | TEMPERATURE: 98.1 F | RESPIRATION RATE: 16 BRPM | SYSTOLIC BLOOD PRESSURE: 131 MMHG | BODY MASS INDEX: 40.1 KG/M2 | HEIGHT: 63 IN | DIASTOLIC BLOOD PRESSURE: 84 MMHG | WEIGHT: 226.3 LBS | HEART RATE: 79 BPM

## 2024-03-12 DIAGNOSIS — Z01.818 PRE-OP EVALUATION: Primary | ICD-10-CM

## 2024-03-12 DIAGNOSIS — Z01.818 PRE-OP EVALUATION: ICD-10-CM

## 2024-03-12 LAB
ALBUMIN SERPL BCG-MCNC: 4.1 G/DL (ref 3.5–5.2)
ALP SERPL-CCNC: 91 U/L (ref 40–150)
ALT SERPL W P-5'-P-CCNC: 21 U/L (ref 0–50)
ANION GAP SERPL CALCULATED.3IONS-SCNC: 13 MMOL/L (ref 7–15)
AST SERPL W P-5'-P-CCNC: 17 U/L (ref 0–45)
BILIRUB SERPL-MCNC: 0.3 MG/DL
BUN SERPL-MCNC: 18.6 MG/DL (ref 6–20)
CALCIUM SERPL-MCNC: 9.7 MG/DL (ref 8.6–10)
CHLORIDE SERPL-SCNC: 99 MMOL/L (ref 98–107)
CREAT SERPL-MCNC: 0.67 MG/DL (ref 0.51–0.95)
DEPRECATED HCO3 PLAS-SCNC: 25 MMOL/L (ref 22–29)
EGFRCR SERPLBLD CKD-EPI 2021: >90 ML/MIN/1.73M2
GLUCOSE SERPL-MCNC: 180 MG/DL (ref 70–99)
POTASSIUM SERPL-SCNC: 4 MMOL/L (ref 3.4–5.3)
PROT SERPL-MCNC: 8 G/DL (ref 6.4–8.3)
SODIUM SERPL-SCNC: 137 MMOL/L (ref 135–145)

## 2024-03-12 PROCEDURE — 99203 OFFICE O/P NEW LOW 30 MIN: CPT | Performed by: PHYSICIAN ASSISTANT

## 2024-03-12 PROCEDURE — 80053 COMPREHEN METABOLIC PANEL: CPT | Performed by: PATHOLOGY

## 2024-03-12 PROCEDURE — 36415 COLL VENOUS BLD VENIPUNCTURE: CPT | Performed by: PATHOLOGY

## 2024-03-12 ASSESSMENT — PAIN SCALES - GENERAL: PAINLEVEL: MODERATE PAIN (5)

## 2024-03-12 ASSESSMENT — LIFESTYLE VARIABLES: TOBACCO_USE: 0

## 2024-03-12 ASSESSMENT — ENCOUNTER SYMPTOMS: SEIZURES: 0

## 2024-03-12 NOTE — PATIENT INSTRUCTIONS
Preparing for Your Surgery      Name:  Marylou Mortensen   MRN:  [id number]   :  1985   Today's Date:  3/12/2024       Arriving for surgery:  Surgery date:  3/25/24  Arrival time:  5.30AM    Please come to:     Please come to:      [facility] [address]  [address] [place]  [address]  The Green Ramp for patients and visitors is located beneath the [facility]. The parking facility entrance is at the intersection of [address] [place] [address] and [address]. Patients and visitors who self-park will receive the reduced hospital parking rate (no ticket validation needed).  [employer] parking, located at the [facility] main entrance on [address] [place] [address], is available Monday - Friday from 7 am to 3:30 pm.  Discounted parking pass options can be purchased from  attendants during business hours.  -Check in at the security desk in the [facility] Lobby. They will direct you to the correct elevators.  -Proceed to the 3rd floor, check in at the Adult Surgery Waiting Lounge. [phone]  If you are in need of directions, a wheelchair or escort please stop at the Information Desk in the lobby.  Inform the information person that you are here for surgery; a wheelchair and escort to [address] will be provided.   An escort to the Adult Surgery Waiting Lounge will be provided.    What can I eat or drink?  -  You may eat and drink normally up to 8 hours prior to arrival time. (Until 9.30PM)  -  You may have clear liquids until 2 hours prior to arrival time. (Until 3.30AM)    Examples of clear liquids:  Water  Clear broth  Juices (apple, white grape, white cranberry  and cider) without pulp  Noncarbonated, powder based beverages  (lemonade and Erich-Aid)  Sodas (Sprite, 7-Up, ginger ale and seltzer)  Coffee or tea (without milk or cream)  Gatorade    -  No Alcohol or cannabis  products for at least 24 hours before surgery.     Which medicines can I take?    Hold Aspirin for 7 days before surgery.   Hold Multivitamins for 7 days before surgery.  Hold Supplements for 7 days before surgery.  Hold Ibuprofen (Advil, Motrin) for 3 day(s) before surgery--unless otherwise directed by surgeon.  Hold Naproxen (Aleve) for 4 days before surgery.    -  DO NOT take these medications the day of surgery:  None    -  PLEASE TAKE these medications the day of surgery:  Deltasone (finish the course as prescribed).  Take your acid reflux medications as needed.    How do I prepare myself?  - Please take 2 showers (one the night prior to surgery and one the morning of surgery) using Scrubcare or Hibiclens soap.    Use this soap only from the neck to your toes.     Leave the soap on your skin for one minute--then rinse thoroughly.      You may use your own shampoo and conditioner. No other hair products.   - Please remove all jewelry and body piercings.  - No lotions, deodorants or fragrance.  - No makeup or fingernail polish.   - Bring your ID and insurance card.    -If you use a CPAP machine, please bring the CPAP machine, tubing, and mask to hospital.    -If you have a Deep Brain Stimulator, Spinal Cord Stimulator, or any Neuro Stimulator device---you must bring the remote control to the hospital.      ALL PATIENTS GOING HOME THE SAME DAY OF SURGERY ARE REQUIRED TO HAVE A RESPONSIBLE ADULT TO DRIVE AND BE IN ATTENDANCE WITH THEM FOR 24 HOURS FOLLOWING SURGERY.    Covid testing policy as of 12/06/2022  Your surgeon will notify and schedule you for a COVID test if one is needed before surgery--please direct any questions or COVID symptoms to your surgeon      Questions or Concerns:    - For any questions regarding the day of surgery or your hospital stay, please contact the Pre Admission Nursing Office at [phone].       - If you have health changes between today and your surgery, please call your surgeon.        - For questions after surgery, please call your surgeons office.           Current Visitor Guidelines    You may have 2 visitors in the pre op area.    Visiting hours: 8 a.m. to 8:30 p.m.    Patients confirmed or suspected to have symptoms of COVID 19 or flu:     No visitors allowed for adult patients.   Children (under age 18) can have 1 named visitor.     People who are sick or showing symptoms of COVID 19 or flu:    Are not allowed to visit patients--we can only make exceptions in special situations.       Please follow these guidelines for your visit:          Please maintain social distance          Masking is optional--however at times you may be asked to wear a mask for the safety of yourself and others     Clean your hands with alcohol hand . Do this when you arrive at and leave the building and patient room,    And again after you touch your mask or anything in the room.     Go directly to and from the room you are visiting.     Stay in the patient s room during your visit. Limit going to other places in the hospital as much as possible     Leave bags and jackets at home or in the car.     For everyone s health, please don t come and go during your visit. That includes for smoking   during your visit.

## 2024-03-12 NOTE — H&P
"  Pre-Operative H & P     CC:  Preoperative exam to assess for increased cardiopulmonary risk while undergoing surgery and anesthesia.    Date of Encounter: 3/12/2024  Primary Care Physician:  No Ref-Primary, Physician     Reason for visit:   Encounter Diagnosis   Name Primary?    Pre-op evaluation Yes       HPI  Marylou Mortensen is a 38 year old female who presents for pre-operative H & P in preparation for  Procedure Information       Case: [id number] Date/Time: 03/25/24 0730    Procedure: removal two right thigh tumors. 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 She is s/p excision 6 years ago. She now has a recurrence and is scheduled for the above procedure.     History is obtained from the patient and chart review    Hx of abnormal bleeding or anti-platelet use: denies    Menstrual history: Patient's last menstrual period was 02/08/2024.     Past Medical History  Past Medical History:   Diagnosis Date    Anemia     Arthritis     Crohn's disease (H)     GERD (gastroesophageal reflux disease)     Occasional symptoms, does not need to take medication regularly.      H/O Elevated liver enzymes     Patient was told it was due to her carb-heavy diet, and \"pre-diabetes\"    H/O tension headache     Immune disorder (H24)     PONV (postoperative nausea and vomiting)     Prediabetes     RA (rheumatoid arthritis) (H)     Reduced vision        Past Surgical History  Past Surgical History:   Procedure Laterality Date    COLONOSCOPY      ENDOSCOPY      EXCISE SOFT TISSUE TUMOR THIGH Right 5/31/2018    Procedure: EXCISE SOFT TISSUE TUMOR THIGH;  Biopsy Right Thigh Tumor; "  Surgeon: Dylon Mendez MD;  [facility]    EXCISE SOFT TISSUE TUMOR THIGH Right 7/26/2018    Procedure: EXCISE SOFT TISSUE TUMOR THIGH;  Right Thigh Tumor Removal ;  Surgeon: Dylon Mendez MD;  [facility]       Prior to Admission Medications  Current Outpatient Medications   Medication Sig Dispense Refill    IBUPROFEN PO Take by mouth every 8 hours as needed for moderate pain      predniSONE (DELTASONE) 10 MG tablet Take 10 mg by mouth every morning         Allergies  No Known Allergies    Social History  Social History     Socioeconomic History    Marital status: Single     Spouse name: Not on file    Number of children: Not on file    Years of education: Not on file    Highest education level: Not on file   Occupational History    Occupation: student     Employer: [employer]   Tobacco Use    Smoking status: Never    Smokeless tobacco: Never   Vaping Use    Vaping Use: Never used   Substance and Sexual Activity    Alcohol use: Yes     Comment: Rarely, maybe one drink once a month.      Drug use: No    Sexual activity: Not Currently     Partners: Male     Birth control/protection: None   Other Topics Concern    Parent/sibling w/ CABG, MI or angioplasty before 65F 55M? Not Asked   Social History Narrative    Not on file     Social Determinants of Health     Financial Resource Strain: Not on file   Food Insecurity: Not on file   Transportation Needs: Not on file   Physical Activity: Not on file   Stress: Not on file   Social Connections: Not on file   Interpersonal Safety: Low Risk  (2/14/2024)    Interpersonal Safety     Do you feel physically and emotionally safe where you currently live?: Yes     Within the past 12 months, have you been hit, slapped, kicked or otherwise physically hurt by someone?: No     Within the past 12 months, have you been humiliated or emotionally abused in other ways by your partner or ex-partner?: No   Housing Stability: Not on file       Family History  Family  History   Problem Relation Age of Onset    Tumor Mother         Multiple tumors over the body    Heart Disease Mother     Crohn's Disease Mother     Osteoporosis Mother     Migraines Mother     Bone Cancer Mother     Mental Illness Mother     Depression Mother     Coronary Artery Disease Mother     Diabetes Father     Obesity Father     Breast Cancer Father     Coronary Artery Disease Father     Bone Cancer Father     Celiac Disease Brother     No Known Problems Brother     Coronary Artery Disease Maternal Grandmother 50    Cardiac Sudden Death Maternal Grandmother     Breast Cancer Maternal Grandmother     Bone Cancer Maternal Grandmother     Cancer Maternal Grandfather     Breast Cancer Paternal Grandmother     Cancer Paternal Grandmother     Obesity Paternal Grandmother     Bone Cancer Paternal Grandmother     Medical History Unknown Paternal Grandfather     Deep Vein Thrombosis (DVT) No family hx of        Review of Systems  The complete review of systems is negative other than noted in the HPI or here.   Anesthesia Evaluation   Pt has had prior anesthetic.         ROS/MED HX  ENT/Pulmonary:     (+)     ANDREA risk factors, snores loudly,  obese,                             (-) tobacco use   Neurologic:  - neg neurologic ROS  (-) no seizures and no CVA   Cardiovascular:     (+)  - -   -  - -                                 Previous cardiac testing   Echo: Date: Results:    Stress Test:  Date: Results:    ECG Reviewed:  Date: 2018 Results:  Sinus  Rhythm   -RSR(V1) -possible incomplete right bundle branch block and anterior fascicular block.    -Abnormal precordial QRS contours -nondiagnostic for this age.   Cath:  Date: Results:   (-) taking anticoagulants/antiplatelets   METS/Exercise Tolerance: 4 - Raking leaves, gardening Comment: Can ascend stairs without FISHER or CP.  Currently limited in activity over past month due to joint pain    Hematologic:    (-) history of blood clots and history of blood transfusion  "  Musculoskeletal: Comment: Spindle cell tumor of thigh  RA      GI/Hepatic:     (+) GERD (OTC meds PRN),      Inflammatory bowel disease,             Renal/Genitourinary:  - neg Renal ROS     Endo:     (+)            Chronic steroid usage for  Date most recently used: on taper for IBD flare.        Psychiatric/Substance Use:  - neg psychiatric ROS     Infectious Disease:  - neg infectious disease ROS     Malignancy:       Other:            /84 (BP Location: Right arm, Patient Position: Sitting, Cuff Size: Adult Regular)   Pulse 79   Temp 98.1  F (36.7  C) (Oral)   Resp 16   Ht 1.6 m (5' 3\")   Wt 102.6 kg (226 lb 4.8 oz)   LMP 02/08/2024   SpO2 94%   Breastfeeding No   BMI 40.09 kg/m      Physical Exam   Constitutional: Awake, alert, cooperative, no apparent distress, and appears stated age.  Eyes: Pupils equal, round and reactive to light, extra ocular muscles intact, sclera clear, conjunctiva normal.  HENT: Normocephalic, oral pharynx with moist mucus membranes, fair dentition  Respiratory: Clear to auscultation bilaterally, no crackles or wheezing.  Cardiovascular: Regular rate and rhythm, normal S1 and S2, and no murmur noted.  Carotids no bruits. Mild LE edema  GI: Normal bowel sounds, soft, non-distended, non-tender  Genitourinary:  deferred  Skin: Warm and dry.    Musculoskeletal: Full ROM of neck. There is no redness or warmth of the exposed joints. Gross motor strength is normal.    Neurologic: Awake, alert, oriented to name, place and time. Cranial nerves II-XII are grossly intact.   Neuropsychiatric: Calm, cooperative. Normal affect.     Prior Labs/Diagnostic Studies   All labs and imaging personally reviewed     EKG/ stress test - if available please see in ROS above   No results found.       No data to display                  The patient's records and results personally reviewed by this provider.     Outside records reviewed from: No outside records available    LAB/DIAGNOSTIC STUDIES " "TODAY:  CMP    Assessment    Marylou Mortensen is a 38 year old female seen as a PAC referral for risk assessment and optimization for anesthesia.    Plan/Recommendations  Pt will be optimized for the proposed procedure.  See below for details on the assessment, risk, and preoperative recommendations    NEUROLOGY  - No history of TIA, CVA or seizure  -Post Op delirium risk factors:  No risk identified    ENT  - No current airway concerns.  Will need to be reassessed day of surgery.  Mallampati: II  TM: > 3    CARDIAC  - No history of CAD, Hypertension, and Afib  -denies cardiac symptoms. Reports over the past month, she has been limited in activity due to joint pain. Prior to current joint symptoms, she was walking the stairs at her apartment multiple times daily without exertional symptoms  - METS (Metabolic Equivalents)  Patient performs 4 or more METS exercise without symptoms            Total Score: 0      RCRI-Very low risk: Class 1 0.4% complication rate            Total Score: 0        PULMONARY  ANDREA Low Risk            Total Score: 2    ANDREA: Snores loudly    ANDREA: BMI over 35 kg/m2      - Denies asthma or inhaler use  - Tobacco History    History   Smoking Status    Never   Smokeless Tobacco    Never       GI  - GERD, using OTC medications PRN  -crohn's disease, follows with [facility]. Considering new medication in the future  -h/o elevated LFTs. Felipe update today   PONV High Risk  Total Score: 4           1 AN PONV: Pt is Female    1 AN PONV: Patient is not a current smoker    1 AN PONV: Patient has history of PONV    1 AN PONV: Intended Post Op Opioids        /RENAL  - Baseline Creatinine- will update today    ENDOCRINE    - BMI: Estimated body mass index is 40.09 kg/m  as calculated from the following:    Height as of this encounter: 1.6 m (5' 3\").    Weight as of this encounter: 102.6 kg (226 lb 4.8 oz).  Obesity (BMI >30)  - No history of Diabetes Mellitus. H/o prediabetes     HEME  VTE Low Risk 0.26%  "           Total Score: 0      - No history of abnormal bleeding or antiplatelet use.    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 Currently having a flare of symptoms. On prednisone taper per [facility] provider. She is currently taking 10mg daily. She believes she will be done with the taper by her procedure date    Different anesthesia methods/types have been discussed with the patient, but they are aware that the final plan will be decided by the assigned anesthesia provider on the date of service.  Patient was also seen by Dr. Motta. She discussed with Dr Polo    The patient is optimized for their procedure. AVS with information on surgery time/arrival time, meds and NPO status given by nursing staff. No further diagnostic testing indicated.      On the day of service:     Prep time: 10 minutes  Visit time: 12 minutes  Documentation time: 8 minutes  ------------------------------------------  Total time: 30 minutes      Maria Fernanda Vidal PA-C  Preoperative Assessment Center  [facility]  [facility]  Phone: [phone]  Fax: [phone]

## 2024-03-12 NOTE — ADDENDUM NOTE
Addended by: ANSHUL TYLER on: 3/12/2024 10:16 AM     Modules accepted: Orders     Intermittently tachypneic.  Continues on 2 liters HFNC with FiO2 needs 21-25%.  Infant had one large spell during eye exam, following sucrose administration requiring oral suction, increased liter flow, stim and increased O2 to resolve.  No other apnea or bradycardia this shift.  Tolerating continuous drip feeds, went to breast x1, latched with nipple shield.  Voiding well, stooling from ostomy.  Ostomy bag changed for leakage-skin healthy and intact. Stomas pink and protruding.  Mom here throughout the day and has been updated.

## 2024-03-15 ENCOUNTER — LAB (OUTPATIENT)
Dept: LAB | Facility: CLINIC | Age: 39
End: 2024-03-15
Payer: COMMERCIAL

## 2024-03-15 DIAGNOSIS — Z01.818 PRE-OP EVALUATION: ICD-10-CM

## 2024-03-15 LAB — HBA1C MFR BLD: 6.3 %

## 2024-03-15 PROCEDURE — 83036 HEMOGLOBIN GLYCOSYLATED A1C: CPT | Performed by: PHYSICIAN ASSISTANT

## 2024-03-15 PROCEDURE — 99000 SPECIMEN HANDLING OFFICE-LAB: CPT | Performed by: PATHOLOGY

## 2024-03-15 PROCEDURE — 36415 COLL VENOUS BLD VENIPUNCTURE: CPT | Performed by: PATHOLOGY

## 2024-03-22 ENCOUNTER — OFFICE VISIT (OUTPATIENT)
Dept: FAMILY MEDICINE | Facility: CLINIC | Age: 39
End: 2024-03-22
Payer: COMMERCIAL

## 2024-03-22 VITALS
OXYGEN SATURATION: 95 % | HEIGHT: 63 IN | BODY MASS INDEX: 38.43 KG/M2 | RESPIRATION RATE: 18 BRPM | HEART RATE: 85 BPM | SYSTOLIC BLOOD PRESSURE: 128 MMHG | TEMPERATURE: 98.1 F | DIASTOLIC BLOOD PRESSURE: 78 MMHG | WEIGHT: 216.9 LBS

## 2024-03-22 DIAGNOSIS — K50.80 CROHN'S DISEASE OF BOTH SMALL AND LARGE INTESTINE WITHOUT COMPLICATION (H): Primary | ICD-10-CM

## 2024-03-22 DIAGNOSIS — M79.605 PAIN IN BOTH LOWER EXTREMITIES: ICD-10-CM

## 2024-03-22 DIAGNOSIS — M79.604 PAIN IN BOTH LOWER EXTREMITIES: ICD-10-CM

## 2024-03-22 PROCEDURE — 99213 OFFICE O/P EST LOW 20 MIN: CPT

## 2024-03-22 RX ORDER — CELECOXIB 200 MG/1
200 CAPSULE ORAL DAILY
Qty: 14 CAPSULE | Refills: 0 | Status: SHIPPED | OUTPATIENT
Start: 2024-03-22 | End: 2024-04-17

## 2024-03-22 ASSESSMENT — PAIN SCALES - GENERAL: PAINLEVEL: EXTREME PAIN (8)

## 2024-03-22 NOTE — PROGRESS NOTES
Assessment & Plan     (K50.80) Crohn's disease of both small and large intestine without complication (H)  (primary encounter diagnosis)  Comment: Chronic with acute flare. No signs of respiratory distress, vital signs stable, and no red flag signs requiring immediate need for medical attention.  Patient currently being managed with a course of steroids via GI provider.  Patient reports that Crohn's flare seems to be well-managed and monitored by GI providers.  Plan: celecoxib (CELEBREX) 200 MG capsule  Continue to follow-up with GI for further assessment and management of Crohn's flare    (M79.604,  M79.605) Pain in both lower extremities  Comment: Acute and Severe. No signs of respiratory distress, vital signs stable, and no red flag signs requiring immediate need for medical attention.  Patient wearing compression stockings to manage edema which has successfully reduced swelling.  Single dose of naproxen today has helped to reduce discomfort to allow patient to be more mobile.  Considering that her surgery has been canceled, can move forward with more consistent NSAID dosing to manage discomfort and inflammation.  Will prescribe Celebrex 200 mg twice daily until patient is able to see rheumatology for further management and evaluation of her concern.  Plan: celecoxib (CELEBREX) 200 MG capsule    Follow-up on March 25 for [place] to assess pain management status    It is important to seek immediate medical attention if you are having symptoms of chest pain, fever, shortness of breath, palpitations, or any changes in your mental status.      Prescription drug management  I spent a total of 28 minutes on the day of the visit.   Time spent by me doing chart review, history and exam, documentation and further activities per the note      FUTURE APPOINTMENTS:       - Follow-up visit in 3 days to assess adequate pain management       - Follow-up for annual visit or as needed    Subjective   Marylou is a 38 year old,  presenting for the following health issues:  office visit and Recheck Medication (Pt reports that both knees and feet are swollen and has been for about a month. Pt reports that she has crones disease.)      3/22/2024     3:00 PM   Additional Questions   Roomed by satish   Accompanied by catracho         3/22/2024     3:00 PM   Patient Reported Additional Medications   Patient reports taking the following new medications none     History of Present Illness       Reason for visit:  Swelling in ankles and knees    She eats 2-3 servings of fruits and vegetables daily.She consumes 1 sweetened beverage(s) daily.She exercises with enough effort to increase her heart rate 9 or less minutes per day.  She exercises with enough effort to increase her heart rate 3 or less days per week.   She is taking medications regularly.  Marylou is a 38-year-old female with a past medical history significant for Crohn's disease, mass of the right thigh, and an intramuscular lipoma who presents today with concerns for bilateral lower extremity swelling and pain.  Patient reports that she consistently has excess inflammation and swelling of her bilateral lower extremities with Crohn's flares.  She reports that about 1 month ago she began having fairly severe Crohn's flare, then reached out to her GI doctor who prescribed her a course of prednisone.  Patient reported that at that time she also began experiencing bilateral knee pain, ankle, and foot swelling that was accompanied by severe pain.  She notes that she has had this concern in the past, and generally her course of steroids to manage her Crohn's also helps to manage her lower extremity discomfort.  Unfortunately, patient was also simultaneously preparing to have surgery to remove an intermuscular lipoma that is growing in her leg.  For this reason, she is avoid using NSAIDs to manage her discomfort, as she knows that they should be avoided presurgically to prevent bleeding.  To reach out  "to her surgeon this morning to let them know about her discomfort, and they elected to cancel her surgery until her Crohn's was better under control.  Patient reports that after she started the steroid burst her swelling and pain decreased, but are not completely managed.  She did take a dose of naproxen this morning as her surgery was canceled, and she notes that this is helpful to manage her pain a bit more as well.  She notes that pain is mainly localized to her ankles and feet, and also includes her knees.  She describes it as a throbbing and aching pain that prevents her from even walking around her house without crutches.  She notes that it interferes with her activities of daily living to the point that it takes her nearly 30 minutes to get from her bed to the toilet to use the bathroom.  She is currently wearing compression stockings to manage the swelling, and rest is much as possible with her legs elevated to keep comfortable.  She notes that she previously did Remicade infusions to manage her Crohn's symptoms, which were very helpful, but she has been recently working with a new GI provider, and she has not continued on with these infusions.  Aside from this, she feels altogether well and declines any other concerning symptoms including fever, chills, redness or heat at any joint sites, chest pain, shortness of breath, or GI upset.      Review of Systems  Constitutional, HEENT, cardiovascular, pulmonary, gi and gu systems are negative, except as otherwise noted.      Objective    /78 (BP Location: Left arm, Patient Position: Sitting, Cuff Size: Adult Large)   Pulse 85   Temp 98.1  F (36.7  C) (Tympanic)   Resp 18   Ht 1.6 m (5' 3\")   Wt 98.4 kg (216 lb 14.4 oz)   LMP 02/08/2024 (Approximate)   SpO2 95%   BMI 38.42 kg/m    Body mass index is 38.42 kg/m .  Physical Exam   GENERAL: alert and no distress  NECK: no adenopathy, no asymmetry, masses, or scars  RESP: lungs clear to auscultation - no " rales, rhonchi or wheezes  CV: regular rate and rhythm, normal S1 S2, no S3 or S4, no murmur, click or rub, BLE edema  ABDOMEN: soft, nontender, no hepatosplenomegaly, no masses and bowel sounds normal  MS: no gross musculoskeletal defects noted, mild non pitting edema to bilateral knees and ankles. Full ROM, strength, and sensation intact in BLE. Tenderness to palpation over generalized feet and ankles  SKIN: no suspicious lesions or rashes  NEURO: Normal strength and tone, mentation intact and speech normal    Paige Tee DNP FNP-C  Family Nurse Practitioner - Same Day Provider  [facility]          Signed Electronically by: HARESH Ann CNP

## 2024-03-25 DIAGNOSIS — M79.605 PAIN IN BOTH LOWER EXTREMITIES: Primary | ICD-10-CM

## 2024-03-25 DIAGNOSIS — M79.604 PAIN IN BOTH LOWER EXTREMITIES: Primary | ICD-10-CM

## 2024-03-25 RX ORDER — ACETAMINOPHEN AND CODEINE PHOSPHATE 300; 30 MG/1; MG/1
1 TABLET ORAL EVERY 8 HOURS PRN
Qty: 3 TABLET | Refills: 0 | Status: SHIPPED | OUTPATIENT
Start: 2024-03-25 | End: 2024-04-17

## 2024-03-25 RX ORDER — ACETAMINOPHEN AND CODEINE PHOSPHATE 300; 30 MG/1; MG/1
1 TABLET ORAL EVERY 8 HOURS PRN
Qty: 3 TABLET | Refills: 0 | Status: SHIPPED | OUTPATIENT
Start: 2024-03-25 | End: 2024-03-25 | Stop reason: ALTCHOICE

## 2024-04-15 NOTE — TELEPHONE ENCOUNTER
Patient is scheduled for surgery with Dr. Mendez    Spoke with: Marylou    Date of Surgery: 5/13/24    [facility]    Informed patient they will need an adult  Yes    Pre op with Provider PAC 4/17/24 at 4:45PM    Additional imaging/appointment: PO Rescheduled     Additional comments: Rescheduled.         Ivette Hoffmann on 4/15/2024 at 2:15 PM

## 2024-04-15 NOTE — TELEPHONE ENCOUNTER
FUTURE VISIT INFORMATION      SURGERY INFORMATION:  Date: 5/13/24  [facility]  Surgeon:  Dylon Mendez MD   Anesthesia Type:  general  Procedure: Removal two right thigh tumors two incisions     RECORDS REQUESTED FROM:       Primary Care Provider: [employer]

## 2024-04-17 ENCOUNTER — ANESTHESIA EVENT (OUTPATIENT)
Dept: SURGERY | Facility: CLINIC | Age: 39
End: 2024-04-17
Payer: COMMERCIAL

## 2024-04-17 ENCOUNTER — PRE VISIT (OUTPATIENT)
Dept: SURGERY | Facility: CLINIC | Age: 39
End: 2024-04-17

## 2024-04-17 ENCOUNTER — VIRTUAL VISIT (OUTPATIENT)
Dept: SURGERY | Facility: CLINIC | Age: 39
End: 2024-04-17
Payer: COMMERCIAL

## 2024-04-17 VITALS — WEIGHT: 220 LBS | HEIGHT: 63 IN | BODY MASS INDEX: 38.98 KG/M2

## 2024-04-17 DIAGNOSIS — Z01.818 PRE-OP EVALUATION: Primary | ICD-10-CM

## 2024-04-17 DIAGNOSIS — D17.9 INTRAMUSCULAR LIPOMA: ICD-10-CM

## 2024-04-17 PROCEDURE — 99213 OFFICE O/P EST LOW 20 MIN: CPT | Mod: 95 | Performed by: PHYSICIAN ASSISTANT

## 2024-04-17 RX ORDER — NAPROXEN SODIUM 220 MG
2 TABLET ORAL AT BEDTIME
COMMUNITY
End: 2024-06-27

## 2024-04-17 RX ORDER — SULFASALAZINE 500 MG/1
2 TABLET ORAL
COMMUNITY
Start: 2024-03-28

## 2024-04-17 ASSESSMENT — PAIN SCALES - GENERAL: PAINLEVEL: NO PAIN (0)

## 2024-04-17 ASSESSMENT — ENCOUNTER SYMPTOMS: SEIZURES: 0

## 2024-04-17 NOTE — PROGRESS NOTES
Marylou is a 38 year old who is being evaluated via a billable video visit.    How would you like to obtain your AVS? MyChart  If the video visit is dropped, the invitation should be resent by: Text to cell phone: [phone]    Cat Hickman is a 38 year old, presenting for the following health issues:  Pre-Op Exam    HPI         Physical Exam

## 2024-04-17 NOTE — H&P
"  Pre-Operative H & P     CC:  Preoperative exam to assess for increased cardiopulmonary risk while undergoing surgery and anesthesia.    Date of Encounter: 4/17/2024  Primary Care Physician:  No Ref-Primary, Physician     Reason for visit:   Encounter Diagnoses   Name Primary?    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 She was found to have two additional tumors on the back of her right thigh and was evaluated by Dr. Mendez to discuss further management. She is now scheduled for surgery as above.     History is obtained from the patient and chart review    Hx of abnormal bleeding or anti-platelet use: Denies    Menstrual history: Patient's last menstrual period was 03/31/2024 (approximate).     Past Medical History  Past Medical History:   Diagnosis Date    Anemia     Arthritis     Crohn's disease (H)     GERD (gastroesophageal reflux disease)     Occasional symptoms, does not need to take medication regularly.      H/O Elevated liver enzymes     Patient was told it was due to her carb-heavy diet, and \"pre-diabetes\"    H/O tension headache     Immune disorder (H24)     PONV (postoperative nausea and vomiting)     Prediabetes     RA (rheumatoid arthritis) (H)     Reduced vision        Past Surgical History  Past Surgical History:   Procedure " Laterality Date    COLONOSCOPY      ENDOSCOPY      EXCISE SOFT TISSUE TUMOR THIGH Right 5/31/2018    Procedure: EXCISE SOFT TISSUE TUMOR THIGH;  Biopsy Right Thigh Tumor;  Surgeon: Dylon Mendez MD;  [facility]    EXCISE SOFT TISSUE TUMOR THIGH Right 7/26/2018    Procedure: EXCISE SOFT TISSUE TUMOR THIGH;  Right Thigh Tumor Removal ;  Surgeon: Dylon Mendez MD;  [facility]       Prior to Admission Medications  Current Outpatient Medications   Medication Sig Dispense Refill    IBUPROFEN PO Take by mouth every 8 hours as needed for moderate pain      naproxen sodium (ANAPROX) 220 MG tablet Take 2 tablets by mouth at bedtime      predniSONE (DELTASONE) 10 MG tablet Take 10 mg by mouth every morning      sulfaSALAzine (AZULFIDINE) 500 MG tablet Take 2 tablets by mouth 2 times daily         Allergies  No Known Allergies    Social History  Social History     Socioeconomic History    Marital status: Single     Spouse name: Not on file    Number of children: Not on file    Years of education: Not on file    Highest education level: Not on file   Occupational History    Occupation: student     Employer: [employer]   Tobacco Use    Smoking status: Never    Smokeless tobacco: Never   Vaping Use    Vaping status: Never Used   Substance and Sexual Activity    Alcohol use: Yes     Comment: Rarely, maybe one drink once a month.      Drug use: No    Sexual activity: Not Currently     Partners: Male     Birth control/protection: None   Other Topics Concern    Parent/sibling w/ CABG, MI or angioplasty before 65F 55M? Not Asked   Social History Narrative    Not on file     Social Determinants of Health     Financial Resource Strain: Not on file   Food Insecurity: Not on file   Transportation Needs: Not on file   Physical Activity: Not on file   Stress: Not on file   Social Connections: Not on file   Interpersonal Safety: Low Risk  (2/14/2024)    Interpersonal Safety     Do you feel physically and emotionally  safe where you currently live?: Yes     Within the past 12 months, have you been hit, slapped, kicked or otherwise physically hurt by someone?: No     Within the past 12 months, have you been humiliated or emotionally abused in other ways by your partner or ex-partner?: No   Housing Stability: Not on file       Family History  Family History   Problem Relation Age of Onset    Tumor Mother         Multiple tumors over the body    Heart Disease Mother     Crohn's Disease Mother     Osteoporosis Mother     Migraines Mother     Bone Cancer Mother     Mental Illness Mother     Depression Mother     Coronary Artery Disease Mother     Diabetes Father     Obesity Father     Breast Cancer Father     Coronary Artery Disease Father     Bone Cancer Father     Celiac Disease Brother     No Known Problems Brother     Coronary Artery Disease Maternal Grandmother 50    Cardiac Sudden Death Maternal Grandmother     Breast Cancer Maternal Grandmother     Bone Cancer Maternal Grandmother     Cancer Maternal Grandfather     Breast Cancer Paternal Grandmother     Cancer Paternal Grandmother     Obesity Paternal Grandmother     Bone Cancer Paternal Grandmother     Medical History Unknown Paternal Grandfather     Deep Vein Thrombosis (DVT) No family hx of        Review of Systems  The complete review of systems is negative other than noted in the HPI or here.   Anesthesia Evaluation   Pt has had prior anesthetic.     History of anesthetic complications  - PONV.      ROS/MED HX  ENT/Pulmonary:  - neg pulmonary ROS     Neurologic: Comment: Tension headache   (-) no seizures and no CVA   Cardiovascular:  - neg cardiovascular ROS   (+)  - -   -  - -                                 Previous cardiac testing   Echo: Date: Results:    Stress Test:  Date: Results:    ECG Reviewed:  Date: 2018 Results:  Sinus rhythm  Cath:  Date: Results:      METS/Exercise Tolerance: 4 - Raking leaves, gardening    Hematologic:     (+)      anemia,       (-)  history of blood clots and history of blood transfusion   Musculoskeletal: Comment: Intramuscular lipoma  (+)  arthritis,             GI/Hepatic:     (+) GERD,      Inflammatory bowel disease,             Renal/Genitourinary:  - neg Renal ROS     Endo: Comment: Prediabetes    (+)            Chronic steroid usage for IB Disorder.  Obesity,       Psychiatric/Substance Use:  - neg psychiatric ROS     Infectious Disease:  - neg infectious disease ROS     Malignancy:  - neg malignancy ROS     Other:            Virtual visit -  No vitals were obtained    Physical Exam  Constitutional: Pleasant female, no apparent distress, and appears stated age.  Eyes: Pupils equal  HENT: Normocephalic and atraumatic  Respiratory: Non labored breathing on room air  Neurologic: Awake, alert, oriented to name, place and time.   Neuropsychiatric: Calm, cooperative. Normal affect.       Prior Labs/Diagnostic Studies   All labs and imaging personally reviewed     EKG/ stress test - if available please see in ROS above   No results found.    The patient's records and results personally reviewed by this provider.     Outside records reviewed from: Care Everywhere      Assessment  Marylou Mortensen is a 38 year old female seen as a PAC referral for risk assessment and optimization for anesthesia.    Plan/Recommendations  Pt will be optimized for the proposed procedure.  See below for details on the assessment, risk, and preoperative recommendations    NEUROLOGY  - No history of TIA, CVA or seizure  - History of tension headaches    -Post Op delirium risk factors:  No risk identified    ENT  - No current airway concerns.  Will need to be reassessed day of surgery.  Mallampati: Unable to assess  TM: Unable to assess    CARDIAC  - No history of CAD, Hypertension, and Afib  - Denies cardiac history or cardiac symptoms. Activity remains somewhat limited due to joint pain but has been improving with recent treatment. She is able to complete  "household chores and all shopping/errands.  - METS (Metabolic Equivalents)  Patient performs 4 or more METS exercise without symptoms             Total Score: 0      RCRI-Very low risk: Class 1 0.4% complication rate             Total Score: 0        PULMONARY    ANDREA Low Risk             Total Score: 2    ANDREA: Snores loudly    ANDREA: BMI over 35 kg/m2      - Denies asthma or inhaler use  - Tobacco History    History   Smoking Status    Never   Smokeless Tobacco    Never       GI  - GERD  Uses OTC medications as needed  - Crohn's Disease  Follows with [facility]. Managed with prednisone and sulfasalazine. Patient is currently tapering off prednisone and anticipates being off by the time of surgery. Recommend holding sulfasalazine 1 day prior to surgery.     PONV High Risk  Total Score: 4           1 AN PONV: Pt is Female    1 AN PONV: Patient is not a current smoker    1 AN PONV: Patient has history of PONV    1 AN PONV: Intended Post Op Opioids        /RENAL  - Baseline Creatinine  0.67    ENDOCRINE    - BMI: Estimated body mass index is 38.97 kg/m  as calculated from the following:    Height as of this encounter: 1.6 m (5' 3\").    Weight as of this encounter: 99.8 kg (220 lb).  Obesity (BMI >30)  - Prediabetes, A1c 6.3 on 3/15/24    HEME  VTE Low Risk 0.26%             Total Score: 0      - No history of abnormal bleeding or antiplatelet use.      MSK  - Right thigh lipomas  Above surgery planned for further management  - Rheumatoid Arthritis  Managed with Naproxen at bedtime and PRN ibuprofen. Hold Naproxen 4 days and ibuprofen 24 hours prior to surgery.     Different anesthesia methods/types have been discussed with the patient, but they are aware that the final plan will be decided by the assigned anesthesia provider on the date of service.    The patient is optimized for their procedure. AVS with information on surgery time/arrival time, meds and NPO status given by nursing staff. No further diagnostic testing " indicated.    Please refer to the physical examination documented by the anesthesiologist in the anesthesia record on the day of surgery.    Video-Visit Details    Type of service:  Video Visit    Provider received verbal consent for a Video Visit from the patient? Yes   Video Start Time: 1607  Video End Time: 1616    Originating Location (pt. Location): Home    Distant Location (provider location):  Off-site  Mode of Communication:  Video Conference via [employer]  On the day of service:     Prep time: 5 minutes  Visit time: 9 minutes  Documentation time: 7 minutes  ------------------------------------------  Total time: 21 minutes      Kristine Baer PA-C  Preoperative Assessment Center  [facility]  Clinic and Surgery Center  Phone: [phone]  Fax: [phone]

## 2024-04-17 NOTE — PATIENT INSTRUCTIONS
Preparing for Your Surgery      Name:  Marylou Mortensen   MRN:  [id number]   :  1985   Today's Date:  2024       Arriving for surgery:  Surgery date:  24  Arrival time:  5:30 am    Please come to:     [facility] [address]   (Address takes you to the [facility].)  [address] [place] [address]  [id number]     The Green Ramp for patients and visitors is beneath the [facility]. The parking facility entrance is at the intersection of [address] [place] [address] and [address]. Patients and visitors who self-park will receive the reduced hospital parking rate (no ticket validation needed).   [employer] parking, located at the [facility] main entrance on [address] [place] [address], is available Monday - Friday from 7 am to 3:30 pm.   Discounted parking pass options can be purchased from  attendants during business hours.     -Check in at the security desk in the [facility] ([facility])   Lobby. They will direct you to the correct elevators.   -Proceed to the 3rd floor, Adult Surgery Waiting Lounge. [phone]     If you need directions, a wheelchair or escort please stop at the Information Desk in the lobby.  Inform the information person you are here for surgery; a wheelchair and escort to [address] will be provided.   An escort to the Adult Surgery Waiting Lounge will be provided. .    What can I eat or drink?  -  You may eat and drink normally up to 8 hours prior to arrival time. (Until 9:30 pm 24)  -  You may have clear liquids until 2 hours prior to arrival time. (Until 3:30 am)    Examples of clear liquids:  Water  Clear broth  Juices (apple, white grape, white cranberry  and cider) without pulp  Noncarbonated, powder based beverages  (lemonade and Erich-Aid)  Sodas (Sprite, 7-Up, ginger ale and seltzer)  Coffee or tea (without milk or  cream)  Gatorade    -  No Alcohol or cannabis products for at least 24 hours before surgery.     Which medicines can I take?  Hold Aspirin for 7 days before surgery.   Hold Multivitamins for 7 days before surgery.  Hold Supplements for 7 days before surgery.  Hold Ibuprofen (Advil, Motrin) for 1 day before surgery--unless otherwise directed by surgeon.  Hold Naproxen (Aleve) for 4 days before surgery. Take the last Naproxen (Aleve) on 5-8-24, and then hold until surgery.  Acetaminophen (Tylenol) is okay to take if needed.    Hold Sulfasalazine (Azulfidine) starting the day prior to surgery and hold until surgery. Take the last Sulfasalazine on 5-11-24, and then hold until surgery.    -  PLEASE TAKE these medications the day of surgery:  Prednisone (Deltasone)  Acetaminophen (Tylenol) if needed    How do I prepare myself?  - Please take 2 showers (one the night prior to surgery and one the morning of surgery) using Scrubcare or Hibiclens soap.    Use this soap only from the neck to your toes.     Leave the soap on your skin for one minute--then rinse thoroughly.      You may use your own shampoo and conditioner. No other hair products.   - Please remove all jewelry and body piercings.  - No lotions, deodorants or fragrance.  - No makeup or fingernail polish.   - Bring your ID and insurance card.    -If you use a CPAP machine, please bring the CPAP machine, tubing, and mask to hospital.    -If you have a Deep Brain Stimulator, Spinal Cord Stimulator, or any Neuro Stimulator device---you must bring the remote control to the hospital.      ALL PATIENTS GOING HOME THE SAME DAY OF SURGERY ARE REQUIRED TO HAVE A RESPONSIBLE ADULT TO DRIVE AND BE IN ATTENDANCE WITH THEM FOR 24 HOURS FOLLOWING SURGERY.    Covid testing policy as of 12/06/2022  Your surgeon will notify and schedule you for a COVID test if one is needed before surgery--please direct any questions or COVID symptoms to your surgeon      Questions or  Concerns:    - For any questions regarding the day of surgery or your hospital stay, please contact the Pre Admission Nursing Office at [phone].       - If you have health changes between today and your surgery, please call your surgeon.       - For questions after surgery, please call your surgeons office.           Current Visitor Guidelines    You may have 2 visitors in the pre op area.    Visiting hours: 8 a.m. to 8:30 p.m.    Patients confirmed or suspected to have symptoms of COVID 19 or flu:     No visitors allowed for adult patients.   Children (under age 18) can have 1 named visitor.     People who are sick or showing symptoms of COVID 19 or flu:    Are not allowed to visit patients--we can only make exceptions in special situations.       Please follow these guidelines for your visit:          Please maintain social distance          Masking is optional--however at times you may be asked to wear a mask for the safety of yourself and others     Clean your hands with alcohol hand . Do this when you arrive at and leave the building and patient room,    And again after you touch your mask or anything in the room.     Go directly to and from the room you are visiting.     Stay in the patient s room during your visit. Limit going to other places in the hospital as much as possible     Leave bags and jackets at home or in the car.     For everyone s health, please don t come and go during your visit. That includes for smoking   during your visit.

## 2024-05-12 ASSESSMENT — ENCOUNTER SYMPTOMS: SEIZURES: 0

## 2024-05-12 NOTE — ANESTHESIA PREPROCEDURE EVALUATION
"Anesthesia Pre-Procedure Evaluation    Patient: Marylou Mortensen   MRN: [id number] : 1985        Procedure : Procedure(s):  Removal two right thigh tumors  two incisions          Past Medical History:   Diagnosis Date    Anemia     Arthritis     Crohn's disease (H)     GERD (gastroesophageal reflux disease)     Occasional symptoms, does not need to take medication regularly.      H/O Elevated liver enzymes     Patient was told it was due to her carb-heavy diet, and \"pre-diabetes\"    H/O tension headache     Immune disorder (H24)     PONV (postoperative nausea and vomiting)     Prediabetes     RA (rheumatoid arthritis) (H)     Reduced vision       Past Surgical History:   Procedure Laterality Date    COLONOSCOPY      ENDOSCOPY      EXCISE SOFT TISSUE TUMOR THIGH Right 2018    Procedure: EXCISE SOFT TISSUE TUMOR THIGH;  Biopsy Right Thigh Tumor;  Surgeon: Dylon Mendez MD;  [facility]    EXCISE SOFT TISSUE TUMOR THIGH Right 2018    Procedure: EXCISE SOFT TISSUE TUMOR THIGH;  Right Thigh Tumor Removal ;  Surgeon: Dylon Mendez MD;  [facility]      No Known Allergies   Social History     Tobacco Use    Smoking status: Never    Smokeless tobacco: Never   Substance Use Topics    Alcohol use: Yes     Comment: Rarely, maybe one drink once a month.        Wt Readings from Last 1 Encounters:   24 99.8 kg (220 lb)        Anesthesia Evaluation   Pt has had prior anesthetic. Type: General.    History of anesthetic complications  - PONV.      ROS/MED HX  ENT/Pulmonary:  - neg pulmonary ROS     Neurologic: Comment: Tension headache   (-) no seizures and no CVA   Cardiovascular:  - neg cardiovascular ROS   (+)  - -   -  - -                                 Previous cardiac testing   Echo: Date: Results:    Stress Test:  Date: Results:    ECG Reviewed:  Date:  Results:  Sinus rhythm  Cath:  Date: Results:   (-) murmur   METS/Exercise Tolerance: 4 - Raking leaves, gardening  " "  Hematologic:     (+)      anemia,       (-) history of blood clots and history of blood transfusion   Musculoskeletal: Comment: Intramuscular lipoma  (+)  arthritis (no RA),             GI/Hepatic: Comment: Elevated LFTs    (+) GERD, Symptomatic,     Inflammatory bowel disease (crohn's),             Renal/Genitourinary:  - neg Renal ROS     Endo: Comment: Prediabetes-A1C 6.3    (+)            Chronic steroid usage for IB Disorder. Date most recently used: off steroids for a week, was using for 2-3 mo then tapered. Obesity,       Psychiatric/Substance Use:  - neg psychiatric ROS     Infectious Disease:  - neg infectious disease ROS     Malignancy:  - neg malignancy ROS     Other:            Physical Exam    Airway        Mallampati: III   TM distance: < 3 FB   Neck ROM: full   Mouth opening: > 3 cm    Respiratory Devices and Support         Dental     Comment: Has a crack in one front tooth    (+) Minor Abnormalities - some fillings, tiny chips    B=Bridge, C=Chipped, L=Loose, M=Missing    Cardiovascular   cardiovascular exam normal       Rhythm and rate: regular and normal (-) no murmur    Pulmonary   pulmonary exam normal        breath sounds clear to auscultation           OUTSIDE LABS:  CBC:   Lab Results   Component Value Date    WBC 9.3 06/02/2006    HGB 13.6 07/18/2018    HGB 14.1 05/29/2018    HCT 43.5 06/02/2006     06/02/2006     BMP:   Lab Results   Component Value Date     03/12/2024     07/18/2018    POTASSIUM 4.0 03/12/2024    POTASSIUM 3.4 07/18/2018    CHLORIDE 99 03/12/2024    CHLORIDE 107 07/18/2018    CO2 25 03/12/2024    CO2 24 07/18/2018    BUN 18.6 03/12/2024    BUN 13 07/18/2018    CR 0.67 03/12/2024    CR 0.60 07/18/2018     (H) 03/12/2024     (H) 07/18/2018     COAGS: No results found for: \"PTT\", \"INR\", \"FIBR\"  POC:   Lab Results   Component Value Date    HCG Negative 07/26/2018     HEPATIC:   Lab Results   Component Value Date    ALBUMIN 4.1 03/12/2024    " "PROTTOTAL 8.0 03/12/2024    ALT 21 03/12/2024    AST 17 03/12/2024    ALKPHOS 91 03/12/2024    BILITOTAL 0.3 03/12/2024     OTHER:   Lab Results   Component Value Date    A1C 6.3 (H) 03/15/2024    GEORGE 9.7 03/12/2024    TSH 1.36 06/02/2006       Anesthesia Plan    ASA Status:  2    NPO Status:  NPO Appropriate    Anesthesia Type: General.     - Airway: ETT   Induction: Propofol.   Maintenance: Balanced.   Techniques and Equipment:     - Airway: Video-Laryngoscope, Shoulder Meche/Ramp     - Lines/Monitors: BIS, 2nd IV     Consents    Anesthesia Plan(s) and associated risks, benefits, and realistic alternatives discussed. Questions answered and patient/representative(s) expressed understanding.     - Discussed:     - Discussed with:  Patient      - Extended Intubation/Ventilatory Support Discussed: No.      - Patient is DNR/DNI Status: No     Use of blood products discussed: No .     Postoperative Care    Pain management: Oral pain medications, Multi-modal analgesia.   PONV prophylaxis: Background Propofol Infusion, Dexamethasone or Solumedrol, Scopolamine patch, Ondansetron (or other 5HT-3)     Comments:    Other Comments: Discussed risks of anesthesia including nausea, vomiting, sore throat, dental damage, cardiopulmonary complications, agitation, neurologic complications, and serious complications.               Sade Daniel MD    I have reviewed the pertinent notes and labs in the chart from the past 30 days and (re)examined the patient.  Any updates or changes from those notes are reflected in this note.              # Obesity: Estimated body mass index is 38.97 kg/m  as calculated from the following:    Height as of 4/17/24: 1.6 m (5' 3\").    Weight as of 4/17/24: 99.8 kg (220 lb).      "

## 2024-05-13 ENCOUNTER — ANESTHESIA (OUTPATIENT)
Dept: SURGERY | Facility: CLINIC | Age: 39
End: 2024-05-13
Payer: COMMERCIAL

## 2024-05-13 ENCOUNTER — HOSPITAL ENCOUNTER (OUTPATIENT)
Facility: CLINIC | Age: 39
Discharge: HOME | End: 2024-05-13
Attending: ORTHOPAEDIC SURGERY | Admitting: ORTHOPAEDIC SURGERY
Payer: COMMERCIAL

## 2024-05-13 VITALS
WEIGHT: 217.37 LBS | HEIGHT: 63 IN | TEMPERATURE: 98.4 F | HEART RATE: 106 BPM | SYSTOLIC BLOOD PRESSURE: 142 MMHG | RESPIRATION RATE: 16 BRPM | BODY MASS INDEX: 38.52 KG/M2 | OXYGEN SATURATION: 92 % | DIASTOLIC BLOOD PRESSURE: 94 MMHG

## 2024-05-13 DIAGNOSIS — D17.9 INTRAMUSCULAR LIPOMA: Primary | ICD-10-CM

## 2024-05-13 LAB
GLUCOSE BLDC GLUCOMTR-MCNC: 123 MG/DL (ref 70–99)
HGB BLD-MCNC: 12.2 G/DL (ref 11.7–15.7)

## 2024-05-13 PROCEDURE — 11400 EXC TR-EXT B9+MARG 0.5 CM<: CPT | Performed by: NURSE ANESTHETIST, CERTIFIED REGISTERED

## 2024-05-13 PROCEDURE — 88307 TISSUE EXAM BY PATHOLOGIST: CPT | Mod: TC | Performed by: ORTHOPAEDIC SURGERY

## 2024-05-13 PROCEDURE — 258N000003 HC RX IP 258 OP 636: Performed by: NURSE ANESTHETIST, CERTIFIED REGISTERED

## 2024-05-13 PROCEDURE — 250N000025 HC SEVOFLURANE, PER MIN: Performed by: ORTHOPAEDIC SURGERY

## 2024-05-13 PROCEDURE — 85018 HEMOGLOBIN: CPT | Performed by: STUDENT IN AN ORGANIZED HEALTH CARE EDUCATION/TRAINING PROGRAM

## 2024-05-13 PROCEDURE — 250N000009 HC RX 250: Performed by: STUDENT IN AN ORGANIZED HEALTH CARE EDUCATION/TRAINING PROGRAM

## 2024-05-13 PROCEDURE — 11400 EXC TR-EXT B9+MARG 0.5 CM<: CPT | Performed by: STUDENT IN AN ORGANIZED HEALTH CARE EDUCATION/TRAINING PROGRAM

## 2024-05-13 PROCEDURE — 710N000012 HC RECOVERY PHASE 2, PER MINUTE: Performed by: ORTHOPAEDIC SURGERY

## 2024-05-13 PROCEDURE — 710N000010 HC RECOVERY PHASE 1, LEVEL 2, PER MIN: Performed by: ORTHOPAEDIC SURGERY

## 2024-05-13 PROCEDURE — 250N000009 HC RX 250: Performed by: ORTHOPAEDIC SURGERY

## 2024-05-13 PROCEDURE — 250N000009 HC RX 250: Performed by: NURSE ANESTHETIST, CERTIFIED REGISTERED

## 2024-05-13 PROCEDURE — 999N000141 HC STATISTIC PRE-PROCEDURE NURSING ASSESSMENT: Performed by: ORTHOPAEDIC SURGERY

## 2024-05-13 PROCEDURE — 36415 COLL VENOUS BLD VENIPUNCTURE: CPT | Performed by: STUDENT IN AN ORGANIZED HEALTH CARE EDUCATION/TRAINING PROGRAM

## 2024-05-13 PROCEDURE — 88307 TISSUE EXAM BY PATHOLOGIST: CPT | Mod: 26 | Performed by: PATHOLOGY

## 2024-05-13 PROCEDURE — 250N000011 HC RX IP 250 OP 636: Performed by: PHYSICIAN ASSISTANT

## 2024-05-13 PROCEDURE — 250N000013 HC RX MED GY IP 250 OP 250 PS 637: Performed by: STUDENT IN AN ORGANIZED HEALTH CARE EDUCATION/TRAINING PROGRAM

## 2024-05-13 PROCEDURE — 370N000017 HC ANESTHESIA TECHNICAL FEE, PER MIN: Performed by: ORTHOPAEDIC SURGERY

## 2024-05-13 PROCEDURE — 82962 GLUCOSE BLOOD TEST: CPT

## 2024-05-13 PROCEDURE — 250N000011 HC RX IP 250 OP 636: Performed by: NURSE ANESTHETIST, CERTIFIED REGISTERED

## 2024-05-13 PROCEDURE — 272N000001 HC OR GENERAL SUPPLY STERILE: Performed by: ORTHOPAEDIC SURGERY

## 2024-05-13 PROCEDURE — 360N000084 HC SURGERY LEVEL 4 W/ FLUORO, PER MIN: Performed by: ORTHOPAEDIC SURGERY

## 2024-05-13 RX ORDER — ONDANSETRON 4 MG/1
4 TABLET, ORALLY DISINTEGRATING ORAL EVERY 30 MIN PRN
Status: DISCONTINUED | OUTPATIENT
Start: 2024-05-13 | End: 2024-05-13 | Stop reason: HOSPADM

## 2024-05-13 RX ORDER — PROPOFOL 10 MG/ML
INJECTION, EMULSION INTRAVENOUS PRN
Status: DISCONTINUED | OUTPATIENT
Start: 2024-05-13 | End: 2024-05-13

## 2024-05-13 RX ORDER — CEFAZOLIN SODIUM/WATER 2 G/20 ML
2 SYRINGE (ML) INTRAVENOUS
Status: COMPLETED | OUTPATIENT
Start: 2024-05-13 | End: 2024-05-13

## 2024-05-13 RX ORDER — LIDOCAINE HYDROCHLORIDE 20 MG/ML
INJECTION, SOLUTION INFILTRATION; PERINEURAL PRN
Status: DISCONTINUED | OUTPATIENT
Start: 2024-05-13 | End: 2024-05-13

## 2024-05-13 RX ORDER — NALOXONE HYDROCHLORIDE 0.4 MG/ML
0.1 INJECTION, SOLUTION INTRAMUSCULAR; INTRAVENOUS; SUBCUTANEOUS
Status: DISCONTINUED | OUTPATIENT
Start: 2024-05-13 | End: 2024-05-13 | Stop reason: HOSPADM

## 2024-05-13 RX ORDER — HYDROMORPHONE HYDROCHLORIDE 1 MG/ML
0.2 INJECTION, SOLUTION INTRAMUSCULAR; INTRAVENOUS; SUBCUTANEOUS EVERY 5 MIN PRN
Status: DISCONTINUED | OUTPATIENT
Start: 2024-05-13 | End: 2024-05-13 | Stop reason: HOSPADM

## 2024-05-13 RX ORDER — FENTANYL CITRATE 50 UG/ML
INJECTION, SOLUTION INTRAMUSCULAR; INTRAVENOUS PRN
Status: DISCONTINUED | OUTPATIENT
Start: 2024-05-13 | End: 2024-05-13

## 2024-05-13 RX ORDER — LABETALOL HYDROCHLORIDE 5 MG/ML
10 INJECTION, SOLUTION INTRAVENOUS
Status: DISCONTINUED | OUTPATIENT
Start: 2024-05-13 | End: 2024-05-13 | Stop reason: HOSPADM

## 2024-05-13 RX ORDER — ASPIRIN 81 MG/1
162 TABLET ORAL DAILY
Qty: 56 TABLET | Refills: 0 | Status: SHIPPED | OUTPATIENT
Start: 2024-05-13 | End: 2024-06-10

## 2024-05-13 RX ORDER — ACETAMINOPHEN 325 MG/1
975 TABLET ORAL ONCE
Status: COMPLETED | OUTPATIENT
Start: 2024-05-13 | End: 2024-05-13

## 2024-05-13 RX ORDER — ACETAMINOPHEN 325 MG/1
975 TABLET ORAL
Status: COMPLETED | OUTPATIENT
Start: 2024-05-13 | End: 2024-05-13

## 2024-05-13 RX ORDER — OXYCODONE HYDROCHLORIDE 5 MG/1
5 TABLET ORAL EVERY 6 HOURS PRN
Qty: 15 TABLET | Refills: 0 | Status: SHIPPED | OUTPATIENT
Start: 2024-05-13 | End: 2024-05-17

## 2024-05-13 RX ORDER — KETOROLAC TROMETHAMINE 30 MG/ML
INJECTION, SOLUTION INTRAMUSCULAR; INTRAVENOUS PRN
Status: DISCONTINUED | OUTPATIENT
Start: 2024-05-13 | End: 2024-05-13

## 2024-05-13 RX ORDER — ONDANSETRON 2 MG/ML
4 INJECTION INTRAMUSCULAR; INTRAVENOUS EVERY 30 MIN PRN
Status: DISCONTINUED | OUTPATIENT
Start: 2024-05-13 | End: 2024-05-13 | Stop reason: HOSPADM

## 2024-05-13 RX ORDER — PROPOFOL 10 MG/ML
INJECTION, EMULSION INTRAVENOUS CONTINUOUS PRN
Status: DISCONTINUED | OUTPATIENT
Start: 2024-05-13 | End: 2024-05-13

## 2024-05-13 RX ORDER — OXYCODONE HYDROCHLORIDE 5 MG/1
5 TABLET ORAL
Status: COMPLETED | OUTPATIENT
Start: 2024-05-13 | End: 2024-05-13

## 2024-05-13 RX ORDER — ONDANSETRON 4 MG/1
4 TABLET, ORALLY DISINTEGRATING ORAL EVERY 8 HOURS PRN
Qty: 4 TABLET | Refills: 0 | Status: SHIPPED | OUTPATIENT
Start: 2024-05-13 | End: 2024-06-27

## 2024-05-13 RX ORDER — CEFAZOLIN SODIUM/WATER 2 G/20 ML
2 SYRINGE (ML) INTRAVENOUS SEE ADMIN INSTRUCTIONS
Status: DISCONTINUED | OUTPATIENT
Start: 2024-05-13 | End: 2024-05-13 | Stop reason: HOSPADM

## 2024-05-13 RX ORDER — AMOXICILLIN 250 MG
1-2 CAPSULE ORAL 2 TIMES DAILY
Qty: 30 TABLET | Refills: 0 | Status: SHIPPED | OUTPATIENT
Start: 2024-05-13 | End: 2024-06-27

## 2024-05-13 RX ORDER — DEXAMETHASONE SODIUM PHOSPHATE 4 MG/ML
4 INJECTION, SOLUTION INTRA-ARTICULAR; INTRALESIONAL; INTRAMUSCULAR; INTRAVENOUS; SOFT TISSUE
Status: DISCONTINUED | OUTPATIENT
Start: 2024-05-13 | End: 2024-05-13 | Stop reason: HOSPADM

## 2024-05-13 RX ORDER — DEXAMETHASONE SODIUM PHOSPHATE 4 MG/ML
INJECTION, SOLUTION INTRA-ARTICULAR; INTRALESIONAL; INTRAMUSCULAR; INTRAVENOUS; SOFT TISSUE PRN
Status: DISCONTINUED | OUTPATIENT
Start: 2024-05-13 | End: 2024-05-13

## 2024-05-13 RX ORDER — ONDANSETRON 2 MG/ML
INJECTION INTRAMUSCULAR; INTRAVENOUS PRN
Status: DISCONTINUED | OUTPATIENT
Start: 2024-05-13 | End: 2024-05-13

## 2024-05-13 RX ORDER — LIDOCAINE 40 MG/G
CREAM TOPICAL
Status: DISCONTINUED | OUTPATIENT
Start: 2024-05-13 | End: 2024-05-13 | Stop reason: HOSPADM

## 2024-05-13 RX ORDER — SODIUM CHLORIDE, SODIUM LACTATE, POTASSIUM CHLORIDE, CALCIUM CHLORIDE 600; 310; 30; 20 MG/100ML; MG/100ML; MG/100ML; MG/100ML
INJECTION, SOLUTION INTRAVENOUS CONTINUOUS
Status: DISCONTINUED | OUTPATIENT
Start: 2024-05-13 | End: 2024-05-13 | Stop reason: HOSPADM

## 2024-05-13 RX ORDER — GABAPENTIN 100 MG/1
300 CAPSULE ORAL
Status: COMPLETED | OUTPATIENT
Start: 2024-05-13 | End: 2024-05-13

## 2024-05-13 RX ORDER — SCOLOPAMINE TRANSDERMAL SYSTEM 1 MG/1
1 PATCH, EXTENDED RELEASE TRANSDERMAL ONCE
Status: DISCONTINUED | OUTPATIENT
Start: 2024-05-13 | End: 2024-05-13 | Stop reason: HOSPADM

## 2024-05-13 RX ORDER — FENTANYL CITRATE 50 UG/ML
50 INJECTION, SOLUTION INTRAMUSCULAR; INTRAVENOUS EVERY 5 MIN PRN
Status: DISCONTINUED | OUTPATIENT
Start: 2024-05-13 | End: 2024-05-13 | Stop reason: HOSPADM

## 2024-05-13 RX ORDER — HYDROMORPHONE HYDROCHLORIDE 1 MG/ML
0.4 INJECTION, SOLUTION INTRAMUSCULAR; INTRAVENOUS; SUBCUTANEOUS EVERY 5 MIN PRN
Status: DISCONTINUED | OUTPATIENT
Start: 2024-05-13 | End: 2024-05-13 | Stop reason: HOSPADM

## 2024-05-13 RX ORDER — ACETAMINOPHEN 325 MG/1
650 TABLET ORAL EVERY 4 HOURS PRN
Qty: 50 TABLET | Refills: 0 | Status: SHIPPED | OUTPATIENT
Start: 2024-05-13 | End: 2024-06-27

## 2024-05-13 RX ORDER — SODIUM CHLORIDE, SODIUM LACTATE, POTASSIUM CHLORIDE, CALCIUM CHLORIDE 600; 310; 30; 20 MG/100ML; MG/100ML; MG/100ML; MG/100ML
INJECTION, SOLUTION INTRAVENOUS CONTINUOUS PRN
Status: DISCONTINUED | OUTPATIENT
Start: 2024-05-13 | End: 2024-05-13

## 2024-05-13 RX ORDER — FENTANYL CITRATE 50 UG/ML
25 INJECTION, SOLUTION INTRAMUSCULAR; INTRAVENOUS EVERY 5 MIN PRN
Status: DISCONTINUED | OUTPATIENT
Start: 2024-05-13 | End: 2024-05-13 | Stop reason: HOSPADM

## 2024-05-13 RX ORDER — BUPIVACAINE HYDROCHLORIDE AND EPINEPHRINE 2.5; 5 MG/ML; UG/ML
INJECTION, SOLUTION INFILTRATION; PERINEURAL PRN
Status: DISCONTINUED | OUTPATIENT
Start: 2024-05-13 | End: 2024-05-13 | Stop reason: HOSPADM

## 2024-05-13 RX ADMIN — SUGAMMADEX 100 MG: 100 INJECTION, SOLUTION INTRAVENOUS at 10:05

## 2024-05-13 RX ADMIN — Medication 30 MG: at 08:28

## 2024-05-13 RX ADMIN — ACETAMINOPHEN 975 MG: 325 TABLET, FILM COATED ORAL at 12:41

## 2024-05-13 RX ADMIN — SCOPALAMINE 1 PATCH: 1 PATCH, EXTENDED RELEASE TRANSDERMAL at 06:04

## 2024-05-13 RX ADMIN — PROPOFOL 30 MG: 10 INJECTION, EMULSION INTRAVENOUS at 09:58

## 2024-05-13 RX ADMIN — KETOROLAC TROMETHAMINE 30 MG: 30 INJECTION, SOLUTION INTRAMUSCULAR at 09:20

## 2024-05-13 RX ADMIN — DEXAMETHASONE SODIUM PHOSPHATE 8 MG: 4 INJECTION, SOLUTION INTRA-ARTICULAR; INTRALESIONAL; INTRAMUSCULAR; INTRAVENOUS; SOFT TISSUE at 07:37

## 2024-05-13 RX ADMIN — GABAPENTIN 300 MG: 300 CAPSULE ORAL at 06:05

## 2024-05-13 RX ADMIN — MIDAZOLAM 2 MG: 1 INJECTION INTRAMUSCULAR; INTRAVENOUS at 07:27

## 2024-05-13 RX ADMIN — FENTANYL CITRATE 100 MCG: 50 INJECTION INTRAMUSCULAR; INTRAVENOUS at 07:37

## 2024-05-13 RX ADMIN — LIDOCAINE HYDROCHLORIDE 100 MG: 20 INJECTION, SOLUTION INFILTRATION; PERINEURAL at 07:37

## 2024-05-13 RX ADMIN — OXYCODONE HYDROCHLORIDE 5 MG: 5 TABLET ORAL at 11:26

## 2024-05-13 RX ADMIN — ONDANSETRON 4 MG: 2 INJECTION INTRAMUSCULAR; INTRAVENOUS at 09:20

## 2024-05-13 RX ADMIN — SODIUM CHLORIDE, POTASSIUM CHLORIDE, SODIUM LACTATE AND CALCIUM CHLORIDE: 600; 310; 30; 20 INJECTION, SOLUTION INTRAVENOUS at 07:27

## 2024-05-13 RX ADMIN — PROPOFOL 150 MG: 10 INJECTION, EMULSION INTRAVENOUS at 07:37

## 2024-05-13 RX ADMIN — Medication 50 MG: at 07:37

## 2024-05-13 RX ADMIN — PROPOFOL 100 MCG/KG/MIN: 10 INJECTION, EMULSION INTRAVENOUS at 07:37

## 2024-05-13 RX ADMIN — ACETAMINOPHEN 975 MG: 325 TABLET, FILM COATED ORAL at 06:05

## 2024-05-13 RX ADMIN — Medication 2 G: at 07:42

## 2024-05-13 ASSESSMENT — ACTIVITIES OF DAILY LIVING (ADL)
ADLS_ACUITY_SCORE: 31

## 2024-05-13 NOTE — ANESTHESIA CARE TRANSFER NOTE
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    Anesthesia Type:   General     Note:    Oropharynx: oropharynx clear of all foreign objects  Level of Consciousness: drowsy  Oxygen Supplementation: face mask  Level of Supplemental Oxygen (L/min / FiO2): 8  Independent Airway: airway patency satisfactory and stable  Dentition: dentition unchanged  Vital Signs Stable: post-procedure vital signs reviewed and stable  Report to RN Given: handoff report given  Patient transferred to: PACU    Handoff Report: Identifed the Patient, Identified the Reponsible Provider, Reviewed the pertinent medical history, Discussed the surgical course, Reviewed Intra-OP anesthesia mangement and issues during anesthesia, Set expectations for post-procedure period and Allowed opportunity for questions and acknowledgement of understanding  Vitals:  Vitals Value Taken Time   BP     Temp     Pulse 98 05/13/24 1019   Resp 9 05/13/24 1019   SpO2 94 % 05/13/24 1019   Vitals shown include unfiled device data.    Electronically Signed By: HARESH Whitaker CRNA  May 13, 2024  10:20 AM

## 2024-05-13 NOTE — ANESTHESIA POSTPROCEDURE EVALUATION
Patient: Marylou Mortensen    Procedure: Procedure(s):  Removal right thigh tumors       Anesthesia Type:  General    Note:  Disposition: Outpatient   Postop Pain Control: Uneventful            Sign Out: Well controlled pain   PONV: No   Neuro/Psych: Uneventful            Sign Out: Acceptable/Baseline neuro status   Airway/Respiratory: Uneventful            Sign Out: Acceptable/Baseline resp. status   CV/Hemodynamics: Uneventful            Sign Out: Acceptable CV status; No obvious hypovolemia; No obvious fluid overload   Other NRE: NONE   DID A NON-ROUTINE EVENT OCCUR? No    Event details/Postop Comments:  Marylou was satting 93 on room air before induction. In PACU awake and alert, appropriately conversant, satting 90-93 on room air. Using incentive spirometer, which advised to use until off opioid pain medications or 24 hr after anesthesia, whichever later. Advised to discuss with her PCP as she reports that her sats have been running low 90s recently.     Tolerating PO, denies ponv and questions re anesthesia.           Last vitals:  Vitals Value Taken Time   /102 05/13/24 1130   Temp 36.4  C (97.5  F) 05/13/24 1045   Pulse 99 05/13/24 1133   Resp 15 05/13/24 1134   SpO2 91 % 05/13/24 1152   Vitals shown include unfiled device data.    Electronically Signed By: Sade Daniel MD  May 13, 2024  12:00 PM

## 2024-05-13 NOTE — OP NOTE
Preop diagnosis: Recurrent tumor right side medial compartment and growth of tumor right thigh posterior compartment.    Postoperative diagnosis: Same    Procedures performed: Removal of tumor, deep, 18 cm involving both the medial and posterior compartments of the right thigh.    Surgeons: Dylon Mendez and uHgo Varghese.    Estimated blood loss: 100 cc    Pathology submitted: Tumor right thigh in formalin    Marylou was interviewed in the preoperative area with her family present.  Risk and benefits have been reviewed.  Consent was signed.  Surgical site was marked with my initials aligned and intended incision.  Preoperative brief was performed.    Patient was taken the operating room.  She received a general anesthetic and in a sloppy lateral position the right pelvis and lower extremity were prepped and draped sterilely.  Surgical timeout was performed.    The prior medial thigh incision was utilized.  This was determined after the patient was asleep and we performed a physical examination.  This incision was made with the person as far supine as possible.  The incision was extended approximately 1 inch proximally and 2 inches distally compared to the initial incision.    Cautery dissection was taken down to the plane that was posterior to the gracilis muscle.  This muscle was retracted anteriorly.  Dissection was then carried down through the adductor muscle.  The proximal tumor was palpable and freed of adjacent soft tissue over approximately 90% of its surface.  The remaining 10% appeared to be adherent to the medial hamstring tendon.  We then proceeded to dissect through the posterior compartment by passing through the adductor compartment.  The tumor that was deep in the posterior thigh was an virgin tissue and was bluntly dissected free of adjacent tissue with some assistant using the LigaSure cutting device.    There was a band of fibrous tissue connecting the proximal medial and distal posterior  portions of the thigh.  This fascial canal was a rent in the adductor fascia or intermuscular septum.  Under direct visualization this was incised with the LigaSure.  This freed up the distal portion of the tumor and most of the proximal tumor.  The remaining attachments to the ischium and appear to be part of the medial hamstring tendinous insertion.  This was incised under direct visualization.  The tumor was lifted from the wound.  The wound was irrigated and closed with intramuscular subcutaneous and skin sutures.    Postoperative plan: 1.  Discharge to home today.  2.  The histopathology will be in the electronic health record.  3.  Will see her back in 2 weeks for wound inspection.

## 2024-05-13 NOTE — ANESTHESIA PROCEDURE NOTES
Airway       Patient location during procedure: OR       Procedure Start/Stop Times: 5/13/2024 7:41 AM  Staff -        Anesthesiologist:  Sade Daniel MD       CRNA: Margie Hall APRN CRNA       Other Anesthesia Staff: Sierra Nicolas       Performed By: ROSY  Consent for Airway        Urgency: elective  Indications and Patient Condition       Indications for airway management: carson-procedural       Induction type:intravenous       Mask difficulty assessment: 1 - vent by mask    Final Airway Details       Final airway type: endotracheal airway       Successful airway: ETT - single and Oral  Endotracheal Airway Details        ETT size (mm): 7.0       Cuffed: yes       Successful intubation technique: video laryngoscopy       VL Blade Size: Glidescope 3       Grade View of Cords: 1       Adjucts: stylet       Position: Right       Measured from: lips       Secured at (cm): 22       Bite block used: None    Post intubation assessment        Placement verified by: capnometry, equal breath sounds and chest rise        Number of attempts at approach: 1       Number of other approaches attempted: 0       Secured with: tape       Ease of procedure: easy       Dentition: Intact and Unchanged    Medication(s) Administered   Medication Administration Time: 5/13/2024 7:41 AM

## 2024-05-13 NOTE — BRIEF OP NOTE
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  Complications: None.  Implants: * No implants in log *      Assessment and Plan: Marylou Mortensen is a 38 year old female now s/p above procedure on 5/13/2024 with Dr. Mendez.     Activity: Up ad frank  Weight bearing status: WBAT RLE  Pain management:   PO narcotics as tolerated. Toradol ok  Antibiotics: Ancef, 24 hours.  Diet: Begin with clear fluids and progress diet as tolerated.   DVT prophylaxis: Aspirin 162 mg daily x4 weeks  Bracing/Splinting: None.  Dressings: Keep Aquacel c/d/i x 5 days.  Follow-up: Clinic with Dr. Mendez in 2 weeks for wound check  Disposition: Discharge to home today per PACU criteria      Hugo Varghese MD  Orthopaedic Surgery PGY-4

## 2024-05-13 NOTE — DISCHARGE INSTRUCTIONS
To contact a doctor, call Dr. Mendez, Orthopedic, [phone]  or:  '   [phone] and ask for the Resident On Call for          Orthopedics (answered 24 hours a day)  '   Emergency Department:  [facility] Emergency Department:  [phone]     Next dose of tylenol at 12:05 PM    You have a clean dressing on your surgical wound. This should stay in place for 4-5 days to allow the incision to heal. If you would like to change the dressing, please use sterile 4x4 gauze dressings with tape or an ACE bandage over the top. If the dressing becomes wet or saturated with wound drainage, it is appropriate to change the dressing to a clean one. If drainage continues from the incision, please contact your orthopedic surgeon or clinic nurse to discuss.     CALL YOUR PHYSICIAN IF:  1.  Your pain begins to worsen and is unable to be controlled with your medications.  2.  Excessive redness or drainage of cloudy or bloody material from the wounds (Clear red tinted fluid and some mild drainage should be expected). Drainage of any kind 5 days after surgery should be reported to the doctor.  3.  You have a temperature elevation greater than 101.5    4.  You have pain, swelling or redness in your calf. You have numbness or weakness in your leg or foot.    You many call your physician at [phone] during business hours.    For after hours or on weekends, you may call the hospital at [phone] and ask for the orthopedic resident on call.

## 2024-05-14 LAB
PATH REPORT.COMMENTS IMP SPEC: ABNORMAL
PATH REPORT.COMMENTS IMP SPEC: ABNORMAL
PATH REPORT.COMMENTS IMP SPEC: YES
PATH REPORT.FINAL DX SPEC: ABNORMAL
PATH REPORT.GROSS SPEC: ABNORMAL
PATH REPORT.MICROSCOPIC SPEC OTHER STN: ABNORMAL
PATH REPORT.RELEVANT HX SPEC: ABNORMAL
PHOTO IMAGE: ABNORMAL

## 2024-05-30 ENCOUNTER — OFFICE VISIT (OUTPATIENT)
Dept: ORTHOPEDICS | Facility: CLINIC | Age: 39
End: 2024-05-30
Payer: COMMERCIAL

## 2024-05-30 DIAGNOSIS — D17.9 INTRAMUSCULAR LIPOMA: Primary | ICD-10-CM

## 2024-05-30 PROCEDURE — 99024 POSTOP FOLLOW-UP VISIT: CPT | Performed by: ORTHOPAEDIC SURGERY

## 2024-05-30 NOTE — NURSING NOTE
Reason For Visit:   Chief Complaint   Patient presents with    Surgical Followup     Two weeks post-op right thigh tumors removal DOS 5/13/24         38 year old  1985          LMP 05/10/2024 (Exact Date)     Pain Assessment  Patient Currently in Pain: Yes  0-10 Pain Scale: 1  Primary Pain Location:  (right thigh)            Vince Pena ATC

## 2024-05-30 NOTE — PATIENT INSTRUCTIONS
Plan: Follow-up in 6 months with an MRI scan of the right thigh.    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  Reexcision in April 2024 with positive margins.

## 2024-05-30 NOTE — PROGRESS NOTES
Plan: Follow-up in 6 months with an MRI scan of the right thigh.    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  Reexcision in April 2024 with positive margins.    Marylou is seen back today for wound inspection.  She is doing very well otherwise.  She reports some hip and groin discomfort.  I explained to her that this is likely secondary to the positioning during surgery.  She has managed her discomfort well with over-the-counter medications.    On exam today her right hip motion is normal and her right medial thigh wound is healed.    I reviewed the histologic findings of a 17 cm atypical fspindle cell  lipomatous tumor.  She understands we had positive margins as is expected and that the tumor has a chance of recurring a second time.  We will follow her now every 6 months with MRI scans.

## 2024-05-30 NOTE — LETTER
5/30/2024         RE: Marylou Mortensen  [address] [place] [id number]        Dear Colleague,    Thank you for referring your patient, Marylou Mortensen, to the [facility] [place]. Please see a copy of my visit note below.        Plan: Follow-up in 6 months with an MRI scan of the right thigh.    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  Reexcision in April 2024 with positive margins.    Marylou is seen back today for wound inspection.  She is doing very well otherwise.  She reports some hip and groin discomfort.  I explained to her that this is likely secondary to the positioning during surgery.  She has managed her discomfort well with over-the-counter medications.    On exam today her right hip motion is normal and her right medial thigh wound is healed.    I reviewed the histologic findings of a 17 cm atypical fspindle cell  lipomatous tumor.  She understands we had positive margins as is expected and that the tumor has a chance of recurring a second time.  We will follow her now every 6 months with MRI scans.    Dylon Mendez MD

## 2024-06-05 ENCOUNTER — E-VISIT (OUTPATIENT)
Dept: URGENT CARE | Facility: CLINIC | Age: 39
End: 2024-06-05
Payer: COMMERCIAL

## 2024-06-05 DIAGNOSIS — R21 RASH: Primary | ICD-10-CM

## 2024-06-05 PROCEDURE — 99207 PR NON-BILLABLE SERV PER CHARTING: CPT | Performed by: NURSE PRACTITIONER

## 2024-06-06 NOTE — PATIENT INSTRUCTIONS
Dear Marylou Mortensen,    We are sorry you are not feeling well. Based on the responses you provided, it is recommended that you be seen in-person in urgent care so we can better evaluate your symptoms. Please click here to find the nearest urgent care location to you.   You will not be charged for this Visit. Thank you for trusting us with your care.    Elda Reeves CNP  Looks like it could be shingles. You should come in tomorrow or have a video visit with a provider.

## 2024-06-15 ENCOUNTER — HEALTH MAINTENANCE LETTER (OUTPATIENT)
Age: 39
End: 2024-06-15

## 2024-06-27 ENCOUNTER — VIRTUAL VISIT (OUTPATIENT)
Dept: FAMILY MEDICINE | Facility: CLINIC | Age: 39
End: 2024-06-27
Payer: COMMERCIAL

## 2024-06-27 DIAGNOSIS — B02.8 HERPES ZOSTER WITH OTHER COMPLICATION: Primary | ICD-10-CM

## 2024-06-27 PROCEDURE — 99214 OFFICE O/P EST MOD 30 MIN: CPT | Mod: 95 | Performed by: FAMILY MEDICINE

## 2024-06-27 PROCEDURE — G2211 COMPLEX E/M VISIT ADD ON: HCPCS | Mod: 95 | Performed by: FAMILY MEDICINE

## 2024-06-27 RX ORDER — GABAPENTIN 300 MG/1
300 CAPSULE ORAL 3 TIMES DAILY
Qty: 90 CAPSULE | Refills: 2 | Status: SHIPPED | OUTPATIENT
Start: 2024-06-27

## 2024-06-27 RX ORDER — VALACYCLOVIR HYDROCHLORIDE 1 G/1
1 TABLET, FILM COATED ORAL
COMMUNITY
Start: 2024-06-06 | End: 2024-06-27

## 2024-06-27 RX ORDER — INFLIXIMAB 100 MG/10ML
INJECTION, POWDER, LYOPHILIZED, FOR SOLUTION INTRAVENOUS
COMMUNITY
Start: 2024-06-27

## 2024-06-27 NOTE — PROGRESS NOTES
"Marylou is a 38 year old who is being evaluated via a billable video visit.    How would you like to obtain your AVS? MyChart  If the video visit is dropped, the invitation should be resent by: Text to cell phone: [phone]  Will anyone else be joining your video visit? No      Assessment & Plan     Herpes zoster with other complication  Patient is having persistent/prolonged paresthesias from shingles after 3 weeks. We will help with symptoms by prescribing gabapentin 300 mg three times daily and can increase in 300 mg intervals if needed. Advised patient to reach out if she needs an increase in prescription.  Hopefully symptoms improve/resolve on their own in the next 2 months or so.  - gabapentin (NEURONTIN) 300 MG capsule; Take 1 capsule (300 mg) by mouth 3 times daily        MED REC REQUIRED  Post Medication Reconciliation Status:     BMI  Estimated body mass index is 38.52 kg/m  as calculated from the following:    Height as of 5/13/24: 1.6 m (5' 2.99\").    Weight as of 5/13/24: 98.6 kg (217 lb 6 oz).   Weight management plan: Discussed healthy diet and exercise guidelines          Subjective   Marylou is a 38 year old, presenting for the following health issues:  Shingles pain        6/27/2024    12:14 PM   Additional Questions   Roomed by Shemar Ulloa, Visit Facilitator     HPI     Shingles: had left abdominal shingles, after having monthly infusion of Remicade for her Crohn's. She took Valtrex 6/6/24, started around 72 hours of symptoms. She continues to have left sided abdomen numbness and tight feeling.                Objective           Vitals:  No vitals were obtained today due to virtual visit.    Physical Exam   GENERAL: alert and no distress  EYES: Eyes grossly normal to inspection.  No discharge or erythema, or obvious scleral/conjunctival abnormalities.  RESP: No audible wheeze, cough, or visible cyanosis.    SKIN: Visible skin clear. No significant rash, abnormal pigmentation or lesions.  NEURO: " Cranial nerves grossly intact.  Mentation and speech appropriate for age.  PSYCH: Appropriate affect, tone, and pace of words          Video-Visit Details    Type of service:  Video Visit   Originating Location (pt. Location): Home    Distant Location (provider location):  Off-site  Platform used for Video Visit: Stephanie  Signed Electronically by: Gama Maldonado MD

## 2024-08-24 ENCOUNTER — HEALTH MAINTENANCE LETTER (OUTPATIENT)
Age: 39
End: 2024-08-24

## 2024-12-02 ENCOUNTER — HOSPITAL ENCOUNTER (OUTPATIENT)
Dept: MRI IMAGING | Facility: CLINIC | Age: 39
Discharge: HOME | End: 2024-12-02
Attending: ORTHOPAEDIC SURGERY | Admitting: ORTHOPAEDIC SURGERY
Payer: COMMERCIAL

## 2024-12-02 DIAGNOSIS — D17.9 INTRAMUSCULAR LIPOMA: ICD-10-CM

## 2024-12-02 PROCEDURE — 73720 MRI LWR EXTREMITY W/O&W/DYE: CPT | Mod: RT

## 2024-12-02 PROCEDURE — A9585 GADOBUTROL INJECTION: HCPCS | Performed by: ORTHOPAEDIC SURGERY

## 2024-12-02 PROCEDURE — 255N000002 HC RX 255 OP 636: Performed by: ORTHOPAEDIC SURGERY

## 2024-12-02 RX ORDER — GADOBUTROL 604.72 MG/ML
10 INJECTION INTRAVENOUS ONCE
Status: COMPLETED | OUTPATIENT
Start: 2024-12-02 | End: 2024-12-02

## 2024-12-02 RX ADMIN — GADOBUTROL 10 ML: 604.72 INJECTION INTRAVENOUS at 19:16

## 2024-12-03 ENCOUNTER — MEDICAL CORRESPONDENCE (OUTPATIENT)
Dept: HEALTH INFORMATION MANAGEMENT | Facility: CLINIC | Age: 39
End: 2024-12-03
Payer: COMMERCIAL

## 2024-12-05 ENCOUNTER — OFFICE VISIT (OUTPATIENT)
Dept: ORTHOPEDICS | Facility: CLINIC | Age: 39
End: 2024-12-05
Payer: COMMERCIAL

## 2024-12-05 DIAGNOSIS — R22.41 MASS OF RIGHT THIGH: Primary | ICD-10-CM

## 2024-12-05 NOTE — PROGRESS NOTES
Impression: Patient doing well with no evidence of recurrence that is obvious.    Plan: Follow-up MRI scan of bilateral thighs with gadolinium in 6 months.    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  Reexcision in April 2024 with positive margins.    Patient is seen in follow-up today.  She complains of some discomfort just below her right ischium.  Otherwise she is very pleased.  Her hip range of motion has returned and she has not felt any evidence of local recurrence.    On exam there is no obvious mass or abnormality in her area of tenderness just beneath her ischium.  MRI scan was reviewed which shows some lipomatous tissue but based on prior MRIs and the signal intensity are is not consistent with persistence or recurrent tumor.    We will proceed as outlined above.

## 2024-12-05 NOTE — LETTER
12/5/2024      Marylou Mortensen  [address]   [address] [place] [id number]      Dear Colleague,    Thank you for referring your patient, Marylou Mortensen, to the [facility] [place]. Please see a copy of my visit note below.    Impression: Patient doing well with no evidence of recurrence that is obvious.    Plan: Follow-up MRI scan of bilateral thighs with gadolinium in 6 months.    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  Reexcision in April 2024 with positive margins.    Patient is seen in follow-up today.  She complains of some discomfort just below her right ischium.  Otherwise she is very pleased.  Her hip range of motion has returned and she has not felt any evidence of local recurrence.    On exam there is no obvious mass or abnormality in her area of tenderness just beneath her ischium.  MRI scan was reviewed which shows some lipomatous tissue but based on prior MRIs and the signal intensity are is not consistent with persistence or recurrent tumor.    We will proceed as outlined above.    Again, thank you for allowing me to participate in the care of your patient.        Sincerely,        Dylon Mendez MD

## 2024-12-05 NOTE — NURSING NOTE
Reason For Visit:   Chief Complaint   Patient presents with    RECHECK        6 month follow up with MRI scan of the right thigh.      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    Patient relates that she has been experiencing discomfort in in her right posterior thigh 2-3 times a week. ROM is back to normal.        Pain Assessment  Patient Currently in Pain: Yes  Primary Pain Location:  (right posterior thigh)  Pain Descriptors: Discomfort        No Known Allergies        Selina Corea LPN

## 2024-12-10 ENCOUNTER — TRANSCRIBE ORDERS (OUTPATIENT)
Dept: OTHER | Age: 39
End: 2024-12-10

## 2024-12-10 DIAGNOSIS — K50.80 CROHN'S DISEASE OF BOTH SMALL AND LARGE INTESTINE WITHOUT COMPLICATION (H): Primary | ICD-10-CM

## 2024-12-29 ENCOUNTER — HEALTH MAINTENANCE LETTER (OUTPATIENT)
Age: 39
End: 2024-12-29

## 2025-04-21 ENCOUNTER — HOSPITAL ENCOUNTER (OUTPATIENT)
Dept: MAMMOGRAPHY | Facility: CLINIC | Age: 40
Discharge: HOME | End: 2025-04-21
Attending: INTERNAL MEDICINE | Admitting: INTERNAL MEDICINE
Payer: COMMERCIAL

## 2025-04-21 DIAGNOSIS — Z12.31 VISIT FOR SCREENING MAMMOGRAM: ICD-10-CM

## 2025-04-21 DIAGNOSIS — Z00.00 ROUTINE GENERAL MEDICAL EXAMINATION AT A HEALTH CARE FACILITY: ICD-10-CM

## 2025-04-21 PROCEDURE — 77063 BREAST TOMOSYNTHESIS BI: CPT

## 2025-04-22 ENCOUNTER — MYC MEDICAL ADVICE (OUTPATIENT)
Dept: FAMILY MEDICINE | Facility: CLINIC | Age: 40
End: 2025-04-22
Payer: COMMERCIAL

## 2025-04-29 ENCOUNTER — VIRTUAL VISIT (OUTPATIENT)
Dept: FAMILY MEDICINE | Facility: CLINIC | Age: 40
End: 2025-04-29
Payer: COMMERCIAL

## 2025-04-29 ENCOUNTER — OFFICE VISIT (OUTPATIENT)
Dept: OPHTHALMOLOGY | Facility: CLINIC | Age: 40
End: 2025-04-29
Attending: INTERNAL MEDICINE
Payer: COMMERCIAL

## 2025-04-29 DIAGNOSIS — H52.203 MYOPIA OF BOTH EYES WITH ASTIGMATISM: ICD-10-CM

## 2025-04-29 DIAGNOSIS — F90.2 ADHD (ATTENTION DEFICIT HYPERACTIVITY DISORDER), COMBINED TYPE: Primary | ICD-10-CM

## 2025-04-29 DIAGNOSIS — Z86.69 HISTORY OF UVEITIS: ICD-10-CM

## 2025-04-29 DIAGNOSIS — H40.053 BORDERLINE GLAUCOMA OF BOTH EYES WITH OCULAR HYPERTENSION: ICD-10-CM

## 2025-04-29 DIAGNOSIS — K50.80 CROHN'S DISEASE OF BOTH SMALL AND LARGE INTESTINE WITHOUT COMPLICATION (H): ICD-10-CM

## 2025-04-29 DIAGNOSIS — H52.13 MYOPIA OF BOTH EYES WITH ASTIGMATISM: ICD-10-CM

## 2025-04-29 DIAGNOSIS — H57.9 RIGHT EYE SYMPTOMS: Primary | ICD-10-CM

## 2025-04-29 PROCEDURE — 98005 SYNCH AUDIO-VIDEO EST LOW 20: CPT | Performed by: INTERNAL MEDICINE

## 2025-04-29 PROCEDURE — 92004 COMPRE OPH EXAM NEW PT 1/>: CPT | Performed by: OPHTHALMOLOGY

## 2025-04-29 PROCEDURE — 92015 DETERMINE REFRACTIVE STATE: CPT | Performed by: OPHTHALMOLOGY

## 2025-04-29 RX ORDER — BUPROPION HYDROCHLORIDE 150 MG/1
TABLET, EXTENDED RELEASE ORAL
Qty: 60 TABLET | Refills: 0 | Status: SHIPPED | OUTPATIENT
Start: 2025-04-29

## 2025-04-29 ASSESSMENT — TONOMETRY
OS_IOP_MMHG: 29
IOP_METHOD: APPLANATION
OD_IOP_MMHG: 23
OS_IOP_MMHG: 25
OD_IOP_MMHG: 25
IOP_METHOD: ICARE

## 2025-04-29 ASSESSMENT — REFRACTION_WEARINGRX
OD_SPHERE: -3.50
SPECS_TYPE: SVL
OS_SPHERE: -3.75
OD_AXIS: 108
OS_CYLINDER: +1.00
OD_CYLINDER: +1.00
OS_AXIS: 074

## 2025-04-29 ASSESSMENT — CONF VISUAL FIELD
OS_NORMAL: 1
OS_SUPERIOR_TEMPORAL_RESTRICTION: 0
OD_INFERIOR_TEMPORAL_RESTRICTION: 0
OD_INFERIOR_NASAL_RESTRICTION: 0
OD_SUPERIOR_TEMPORAL_RESTRICTION: 0
METHOD: COUNTING FINGERS
OD_NORMAL: 1
OS_SUPERIOR_NASAL_RESTRICTION: 0
OS_INFERIOR_TEMPORAL_RESTRICTION: 0
OS_INFERIOR_NASAL_RESTRICTION: 0
OD_SUPERIOR_NASAL_RESTRICTION: 0

## 2025-04-29 ASSESSMENT — VISUAL ACUITY
OS_CC: 20/20
CORRECTION_TYPE: GLASSES
OS_CC+: -1
METHOD: SNELLEN - LINEAR
OD_CC: 20/20

## 2025-04-29 ASSESSMENT — REFRACTION_MANIFEST
OS_CYLINDER: +1.00
OD_CYLINDER: +1.00
OD_AXIS: 115
OS_SPHERE: -3.75
OD_SPHERE: -3.50
OS_AXIS: 080

## 2025-04-29 ASSESSMENT — SLIT LAMP EXAM - LIDS
COMMENTS: NORMAL
COMMENTS: NORMAL

## 2025-04-29 ASSESSMENT — CUP TO DISC RATIO
OD_RATIO: 0.6
OS_RATIO: 0.6

## 2025-04-29 NOTE — PATIENT INSTRUCTIONS
As discussed went ahead and started on the starter dose of Wellbutrin for ADD, please follow the directions on the prescription and follow-up with me in 4 weeks updating me how you are doing before further titration of the dosage and refills.    Recommend to try this above plan at least for 3 months before considering stimulant medications as a second option for improvement, that will need as to discuss again on video visit at that time.

## 2025-04-29 NOTE — PROGRESS NOTES
HPI       Eye Burning Right Eye    In right eye.  Associated symptoms include burning, tearing, blurred vision and photophobia.             Comments    Here for burning sensation and tearing in right eye x6 months. VA is blurry. Some photophobia. Stable floaters without flashes. Symptoms have minimally improved since onset. Using visine with modest improvement. Previously treated for iritis with prednisolone. Has had in both eyes. Last episode ~8 years ago.    Lab Results       Component                Value               Date                       A1C                      5.4                 04/11/2025                 A1C                      6.3                 03/15/2024              Anupam Chavira, COMT 7:27 AM April 29, 2025     No fhx of glaucoma          Last edited by Michael Valentino MD on 4/29/2025  8:10 AM.         Review of systems for the eyes was negative other than the pertinent positives/negatives listed in the HPI.      Assessment & Plan    HPI:  Marylou Mortensen is a 39 year old female with history of Crohn's diagnosis, multiple uveitis episodes, htn, hld presents for right eye irritation x 6 months. Has been using Visine 1-2x daily.       POHx: myopia, uevitis  PMHx: Crohn's diagnosis, episodes, htn, hld  Current Medications:   Current Outpatient Medications   Medication Sig Dispense Refill    Ergocalciferol (VITAMIN D2) 50 MCG (2000 UT) TABS Take 1 tablet by mouth daily.      inFLIXimab (REMICADE) 100 MG injection Inject into the vein every 4 weeks      lisinopril (ZESTRIL) 2.5 MG tablet Take 1 tablet (2.5 mg) by mouth daily. 90 tablet 0    neomycin-polymyxin-hydrocortisone (CORTISPORIN) 3.5-21123-4 otic suspension Place 3 drops into the right ear 3 times daily. For 6 days 10 mL 0    simvastatin (ZOCOR) 10 MG tablet Take 1 tablet (10 mg) by mouth at bedtime. 90 tablet 1    sulfamethoxazole-trimethoprim (BACTRIM DS) 800-160 MG tablet Take 1 tablet by mouth 2 times daily. 20 tablet 0     sulfaSALAzine (AZULFIDINE) 500 MG tablet Take 2 tablets by mouth 2 times daily       No current facility-administered medications for this visit.     FHx: dad with ocular complications from diabetes  PSHx: denies history of ocular surgeries       Current Eye Medications:      Assessment & Plan:  (H57.9) Right eye symptoms  (primary encounter diagnosis)  (K50.80) Crohn's disease of both small and large intestine without complication (H)  (Z86.69) History of uveitis  No signs of active uveitis on exam today  Pigment on anterior lens capsule left eye > right eye from prior episodes  Recommend changing visine to artificial tears, use 3-4x daily  Return for baseline oct macula and fundus photos     (H52.13,  H52.203) Myopia of both eyes with astigmatism  Patient has minimal change in myopia but a copy of today's glasses prescription was given.  The patient may wish to update the glasses if the lenses are scratched or the frames are too small.    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        Michael Valentino MD     Attending Physician Attestation:  Complete documentation of historical and exam elements from today's encounter can be found in the full encounter summary report (not reduplicated in this progress note).  I personally obtained the chief complaint(s) and history of present illness.  I confirmed and edited as necessary the review of systems, past medical/surgical history, family history, social history, and examination findings as documented by others; and I examined the patient myself.  I personally reviewed the relevant tests, images, and reports as documented above.  I formulated and edited as necessary the assessment and plan and discussed the findings and management plan with the patient and family. - Michael Valentino MD

## 2025-04-29 NOTE — PROGRESS NOTES
Marylou is a 39 year old who is being evaluated via a billable video visit.    How would you like to obtain your AVS? [facility]  If the video visit is dropped, the invitation should be resent by: Text to cell phone: [phone]  Will anyone else be joining your video visit? No          Assessment and Plan  1. ADHD (attention deficit hyperactivity disorder), combined type (Primary)  New diagnosis as per the recent ADD evaluation which patient has submitted through [facility] attachment to me, patient opting for medication start.  Discussed on various options including stimulants and nonstimulants as well as [place] state regulations for controlled substances -patient opting for nonstimulants at this time on the various choices given to her, will do as requested.  Please see follow-up instructions and AVS directions as mentioned below.  Patient understood and agreed the plan, answered all the questions.  - buPROPion (WELLBUTRIN SR) 150 MG 12 hr tablet; Take 1 tablet (150 mg) by mouth daily. for 3 days.  On day 4 increase to 1 tab PO bid.  Dispense: 60 tablet; Refill: 0         The longitudinal plan of care for the diagnosis(es)/condition(s) as documented were addressed during this visit. Due to the added complexity in care, I will continue to support Marylou in the subsequent management and with ongoing continuity of care.      Please note that this note consists of symbols derived from keyboarding, dictation and/or voice recognition software. As a result, there may be errors in the script that have gone undetected. Please consider this when interpreting information found in this chart.    Patient Instructions   As discussed went ahead and started on the starter dose of Wellbutrin for ADD, please follow the directions on the prescription and follow-up with me in 4 weeks updating me how you are doing before further titration of the dosage and refills.    Recommend to try this above plan at least for 3 months before considering  "stimulant medications as a second option for improvement, that will need as to discuss again on video visit at that time.      Return in about 4 weeks (around 5/27/2025), or if symptoms worsen or fail to improve, for E-Visit, If symptoms persist, Follow up of last visit, ADD.    Soila Dial MD  [facility] SUNITA Hickman is a 39 year old, presenting for the following health issues:  A.D.H.D (Medication request )        4/29/2025     1:41 PM   Additional Questions   Roomed by Sophia     History of Present Illness       Reason for visit:  Consult for adhd medication (already diagnosed by another doctor) She is missing 1 dose(s) of medications per week.  She is not taking prescribed medications regularly due to cost of medication and remembering to take.            Last seen patient in April 2025 for annual physical at that time, she is here for evaluation of ADHD medications to be started on.      No Known Allergies     Past Medical History:   Diagnosis Date    Anemia     Arthritis 2017    Crohn's disease (H)     GERD (gastroesophageal reflux disease)     Occasional symptoms, does not need to take medication regularly.      H/O Elevated liver enzymes     Patient was told it was due to her carb-heavy diet, and \"pre-diabetes\"    H/O tension headache     Immune disorder     PONV (postoperative nausea and vomiting)     Prediabetes     RA (rheumatoid arthritis) (H)     Reduced vision        Past Surgical History:   Procedure Laterality Date    COLONOSCOPY  2025    ENDOSCOPY      EXCISE SOFT TISSUE TUMOR THIGH Right 05/31/2018    Procedure: EXCISE SOFT TISSUE TUMOR THIGH;  Biopsy Right Thigh Tumor;  Surgeon: Dylon Mendez MD;  Location: [facility] OR    EXCISE SOFT TISSUE TUMOR THIGH Right 07/26/2018    Procedure: EXCISE SOFT TISSUE TUMOR THIGH;  Right Thigh Tumor Removal ;  Surgeon: Dylon Mendez MD;  Location: [facility] OR    RESECT TUMOR LOWER EXTREMITY Right 05/13/2024    " Procedure: Removal right thigh tumors;  Surgeon: Dylon Mendez MD;  Location: [facility] OR       Family History   Problem Relation Age of Onset    Tumor Mother         Multiple tumors over the body    Heart Disease Mother     Crohn's Disease Mother     Osteoporosis Mother     Migraines Mother     Bone Cancer Mother     Mental Illness Mother     Depression Mother     Coronary Artery Disease Mother     Hypertension Mother     Diabetes Father     Obesity Father     Breast Cancer Father     Coronary Artery Disease Father     Bone Cancer Father     Celiac Disease Brother     No Known Problems Brother     Coronary Artery Disease Maternal Grandmother 50    Cardiac Sudden Death Maternal Grandmother     Breast Cancer Maternal Grandmother     Bone Cancer Maternal Grandmother     Cancer Maternal Grandfather     Breast Cancer Paternal Grandmother     Cancer Paternal Grandmother     Obesity Paternal Grandmother     Bone Cancer Paternal Grandmother     Medical History Unknown Paternal Grandfather     Deep Vein Thrombosis (DVT) No family hx of        Social History     Tobacco Use    Smoking status: Never    Smokeless tobacco: Never   Substance Use Topics    Alcohol use: Yes     Comment: typicaly 1-2 drinks every few months        Current Outpatient Medications   Medication Sig Dispense Refill    buPROPion (WELLBUTRIN SR) 150 MG 12 hr tablet Take 1 tablet (150 mg) by mouth daily. for 3 days.  On day 4 increase to 1 tab PO bid. 60 tablet 0    Ergocalciferol (VITAMIN D2) 50 MCG (2000 UT) TABS Take 1 tablet by mouth daily.      inFLIXimab (REMICADE) 100 MG injection Inject into the vein every 4 weeks      lisinopril (ZESTRIL) 2.5 MG tablet Take 1 tablet (2.5 mg) by mouth daily. 90 tablet 0    neomycin-polymyxin-hydrocortisone (CORTISPORIN) 3.5-86715-9 otic suspension Place 3 drops into the right ear 3 times daily. For 6 days 10 mL 0    simvastatin (ZOCOR) 10 MG tablet Take 1 tablet (10 mg) by mouth at bedtime. 90 tablet 1     sulfaSALAzine (AZULFIDINE) 500 MG tablet Take 2 tablets by mouth 2 times daily       No current facility-administered medications for this visit.          Review of Systems  Constitutional, HEENT, cardiovascular, pulmonary, GI, , musculoskeletal, neuro, skin, endocrine and psych systems are negative, except as otherwise noted.      Objective           Vitals:  No vitals were obtained today due to virtual visit.    Physical Exam   GENERAL: alert and no distress  EYES: Eyes grossly normal to inspection.  No discharge or erythema, or obvious scleral/conjunctival abnormalities.  RESP: No audible wheeze, cough, or visible cyanosis.    SKIN: Visible skin clear. No significant rash, abnormal pigmentation or lesions.  NEURO: Cranial nerves grossly intact.  Mentation and speech appropriate for age.  PSYCH: Appropriate affect, tone, and pace of words      Video-Visit Details    Type of service:  Video Visit   Originating Location (pt. Location): Home    Distant Location (provider location):  On-site  Platform used for Video Visit: Stephanie  Signed Electronically by: Soila Dial MD

## 2025-05-04 ENCOUNTER — HEALTH MAINTENANCE LETTER (OUTPATIENT)
Age: 40
End: 2025-05-04

## 2025-05-27 ENCOUNTER — HOSPITAL ENCOUNTER (OUTPATIENT)
Dept: MRI IMAGING | Facility: CLINIC | Age: 40
Discharge: HOME | End: 2025-05-27
Attending: ORTHOPAEDIC SURGERY
Payer: COMMERCIAL

## 2025-05-27 DIAGNOSIS — R22.41 MASS OF RIGHT THIGH: ICD-10-CM

## 2025-05-27 PROCEDURE — 255N000002 HC RX 255 OP 636: Performed by: ORTHOPAEDIC SURGERY

## 2025-05-27 PROCEDURE — 73720 MRI LWR EXTREMITY W/O&W/DYE: CPT | Mod: 26 | Performed by: RADIOLOGY

## 2025-05-27 PROCEDURE — 73720 MRI LWR EXTREMITY W/O&W/DYE: CPT | Mod: RT

## 2025-05-27 PROCEDURE — A9585 GADOBUTROL INJECTION: HCPCS | Performed by: ORTHOPAEDIC SURGERY

## 2025-05-27 RX ORDER — GADOBUTROL 604.72 MG/ML
10 INJECTION INTRAVENOUS ONCE
Status: COMPLETED | OUTPATIENT
Start: 2025-05-27 | End: 2025-05-27

## 2025-05-27 RX ADMIN — GADOBUTROL 10 ML: 604.72 INJECTION INTRAVENOUS at 16:57

## 2025-05-29 ENCOUNTER — MYC MEDICAL ADVICE (OUTPATIENT)
Dept: FAMILY MEDICINE | Facility: CLINIC | Age: 40
End: 2025-05-29
Payer: COMMERCIAL

## 2025-05-29 NOTE — TELEPHONE ENCOUNTER
Per 4/29/25 VV:   Patient Instructions   As discussed went ahead and started on the starter dose of Wellbutrin for ADD, please follow the directions on the prescription and follow-up with me in 4 weeks updating me how you are doing before further titration of the dosage and refills.    Please review and advise.    Celi Peterson RN

## 2025-06-03 ENCOUNTER — VIRTUAL VISIT (OUTPATIENT)
Dept: ORTHOPEDICS | Facility: CLINIC | Age: 40
End: 2025-06-03
Attending: ORTHOPAEDIC SURGERY
Payer: COMMERCIAL

## 2025-06-03 VITALS — WEIGHT: 230 LBS | HEIGHT: 63 IN | BODY MASS INDEX: 40.75 KG/M2

## 2025-06-03 DIAGNOSIS — D17.9 INTRAMUSCULAR LIPOMA: Primary | ICD-10-CM

## 2025-06-03 DIAGNOSIS — R22.41 MASS OF RIGHT THIGH: ICD-10-CM

## 2025-06-03 ASSESSMENT — PAIN SCALES - GENERAL: PAINLEVEL_OUTOF10: NO PAIN (0)

## 2025-06-03 NOTE — PROGRESS NOTES
Virtual Visit Details    Type of service:  Video Visit        Impression: Patient doing well with no evidence of recurrence that is obvious.     Plan: Follow-up MRI scan of bilateral thighs with gadolinium in 6 months.     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  Reexcision in April 2024 with positive margins.        Patient is seen virtually today.  She has no complaints she says her right thigh feels normally.  She has not identified any other masses or areas of concern.    I reviewed her last 3 MRI scans.  Interpretation of today's scan says that there is residual fatty tumor present.  While I agree with this this tissue that the referring to has not changed since her last MRI scan and has signal intensity that is isointense with subcutaneous fat.  In contrast the MRI scan of the tumor prior to resection is not showing a tumor that is isointense with fat making me feel as if the fat seen on the last 2 MRI scans is normal physiologic fat and not associated with at least a clear recurrence.  Nonetheless we will continue to follow her.  I have explained my reasoning to her.  All her questions were answered.    This virtual visit began at 1:23 PM and ended at 1:31 PM.  The total length of the visit was 8

## 2025-06-03 NOTE — NURSING NOTE
[facility] location[facility] [address] [place], [place] [address]  [place]   [address] [place] [id number]    Is the patient currently in the state of [place]? YES    Visit mode: VIDEO    If the visit is dropped, the patient can be reconnected by:VIDEO VISIT: Send to e-mail at: [email or web address]    Will anyone else be joining the visit? NO  (If patient encounters technical issues they should call [phone]    Are changes needed to the allergy or medication list? Pt stated no med changes    Are refills needed on medications prescribed by this physician? NO    Rooming Documentation:  Questionnaire(s) completed    Reason for visit: RECHECK    Vanessa Coburn VVF

## 2025-06-03 NOTE — LETTER
6/3/2025      Marylou Mortensen  [address]   [address] [place] [id number]      Dear Colleague,    Thank you for referring your patient, Marylou Mortensen, to the [facility]. Please see a copy of my visit note below.    Virtual Visit Details    Type of service:  Video Visit        Impression: Patient doing well with no evidence of recurrence that is obvious.     Plan: Follow-up MRI scan of bilateral thighs with gadolinium in 6 months.     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  Reexcision in April 2024 with positive margins.        Patient is seen virtually today.  She has no complaints she says her right thigh feels normally.  She has not identified any other masses or areas of concern.    I reviewed her last 3 MRI scans.  Interpretation of today's scan says that there is residual fatty tumor present.  While I agree with this this tissue that the referring to has not changed since her last MRI scan and has signal intensity that is isointense with subcutaneous fat.  In contrast the MRI scan of the tumor prior to resection is not showing a tumor that is isointense with fat making me feel as if the fat seen on the last 2 MRI scans is normal physiologic fat and not associated with at least a clear recurrence.  Nonetheless we will continue to follow her.  I have explained my reasoning to her.  All her questions were answered.    This virtual visit began at 1:23 PM and ended at 1:31 PM.  The total length of the visit was 8              Again, thank you for allowing me to participate in the care of your patient.        Sincerely,        Dylon Mendez MD    Electronically signed

## 2025-06-03 NOTE — PATIENT INSTRUCTIONS
Impression: Patient doing well with no evidence of recurrence that is obvious.     Plan: Follow-up MRI scan of bilateral thighs with gadolinium in 6 months.

## 2025-06-04 ENCOUNTER — TELEPHONE (OUTPATIENT)
Dept: ORTHOPEDICS | Facility: CLINIC | Age: 40
End: 2025-06-04
Payer: COMMERCIAL

## 2025-06-04 NOTE — TELEPHONE ENCOUNTER
Patient confirmed scheduled appointment:  Date: 12/126/2025  Time: 1:30pm  Visit type: Return [facility] Tumor(Virtual)  Provider:   Location: [facility]  Testing/imaging: MRI scheduled on 12/3/2025

## 2025-06-12 ENCOUNTER — LAB (OUTPATIENT)
Dept: LAB | Facility: CLINIC | Age: 40
End: 2025-06-12
Payer: COMMERCIAL

## 2025-06-12 ENCOUNTER — ALLIED HEALTH/NURSE VISIT (OUTPATIENT)
Dept: FAMILY MEDICINE | Facility: CLINIC | Age: 40
End: 2025-06-12
Payer: COMMERCIAL

## 2025-06-12 VITALS
OXYGEN SATURATION: 96 % | HEART RATE: 104 BPM | SYSTOLIC BLOOD PRESSURE: 138 MMHG | RESPIRATION RATE: 18 BRPM | DIASTOLIC BLOOD PRESSURE: 88 MMHG

## 2025-06-12 DIAGNOSIS — I10 ESSENTIAL HYPERTENSION: ICD-10-CM

## 2025-06-12 DIAGNOSIS — F90.2 ADHD (ATTENTION DEFICIT HYPERACTIVITY DISORDER), COMBINED TYPE: ICD-10-CM

## 2025-06-12 DIAGNOSIS — Z01.30 BLOOD PRESSURE CHECK: Primary | ICD-10-CM

## 2025-06-12 LAB
AMPHETAMINES UR QL SCN: NORMAL
ANION GAP SERPL CALCULATED.3IONS-SCNC: 12 MMOL/L (ref 7–15)
BARBITURATES UR QL SCN: NORMAL
BENZODIAZ UR QL SCN: NORMAL
BUN SERPL-MCNC: 11.9 MG/DL (ref 6–20)
BZE UR QL SCN: NORMAL
CALCIUM SERPL-MCNC: 10.1 MG/DL (ref 8.8–10.4)
CANNABINOIDS UR QL SCN: NORMAL
CHLORIDE SERPL-SCNC: 104 MMOL/L (ref 98–107)
CREAT SERPL-MCNC: 0.75 MG/DL (ref 0.51–0.95)
EGFRCR SERPLBLD CKD-EPI 2021: >90 ML/MIN/1.73M2
FENTANYL UR QL: NORMAL
GLUCOSE SERPL-MCNC: 107 MG/DL (ref 70–99)
HCO3 SERPL-SCNC: 23 MMOL/L (ref 22–29)
OPIATES UR QL SCN: NORMAL
PCP QUAL URINE (ROCHE): NORMAL
POTASSIUM SERPL-SCNC: 3.5 MMOL/L (ref 3.4–5.3)
SODIUM SERPL-SCNC: 139 MMOL/L (ref 135–145)

## 2025-06-12 NOTE — PROGRESS NOTES
Marylou Mortensen is a 39 year old patient who comes in today for a Blood Pressure check.  Initial BP:  /88   Pulse 104   Resp 18   SpO2 96%      Data Unavailable  Disposition: results routed to provider

## 2025-06-13 ENCOUNTER — RESULTS FOLLOW-UP (OUTPATIENT)
Dept: FAMILY MEDICINE | Facility: CLINIC | Age: 40
End: 2025-06-13

## 2025-07-01 ENCOUNTER — PATIENT OUTREACH (OUTPATIENT)
Dept: CARE COORDINATION | Facility: CLINIC | Age: 40
End: 2025-07-01
Payer: COMMERCIAL

## 2025-07-07 ENCOUNTER — TRANSFERRED RECORDS (OUTPATIENT)
Dept: LAB | Facility: CLINIC | Age: 40
End: 2025-07-07

## 2025-07-07 ENCOUNTER — TRANSFERRED RECORDS (OUTPATIENT)
Dept: MULTI SPECIALTY CLINIC | Facility: CLINIC | Age: 40
End: 2025-07-07
Payer: COMMERCIAL

## 2025-07-07 LAB
ALT SERPL-CCNC: 30 IU/L (ref 0–32)
AST SERPL-CCNC: 32 IU/L (ref 0–40)

## 2025-07-08 ENCOUNTER — TRANSFERRED RECORDS (OUTPATIENT)
Dept: ADMINISTRATIVE | Facility: CLINIC | Age: 40
End: 2025-07-08
Payer: COMMERCIAL

## 2025-07-08 NOTE — PROGRESS NOTES
_________________________________________________________________________________________________    Patient name: Marylou Mortensen  YOB: 1985  Encounter date: 07/07/2025 10:00 AM  Current date: 07/07/2025 12:03 PM  Procedure: Infusion      Infusion/Additional Medication Orders    Assessment: Crohn's disease of small intestine without complication K50.00     Medication grids  Order Date Medication Dose Route Rate Rate 2 Rate 3 Rate 4 Int. of Treat.   09/03/2024 Remicade 10mg/kg IV 262ml/hr    6 Weeks   09/03/2024 Solu-Medrol 40mg IV Push per protocol    6 Weeks   Inf. Date Medication % Conc. Inf. # Therapy Type Bag # Vials Total mgs Lot # Exp. Date   07/07/2025 Remicade  11 maintenance  11 1046.00 [id number] 01/31/2028 07/07/2025 Solu-Medrol  11 pre-infusion  1 40.00 [id number] 11/30/2026   Inf. Date Medication IV Site IV Gauge Start Stop Total Time Wt. (lbs) Wt. (kgs)   07/07/2025 Remicade left hand 22 9:57 AM 10:57 AM 1 hour(s) 0 minute(s) 230.20 104.399   07/07/2025 Solu-Medrol left hand 22 9:48 AM 9:50 AM 0 hour(s) 2 minute(s) 230.20 104.399     Vitals Flowsheet  Time Temp Pulse Resp Sys BP Mari BP Reaction Conc Rate   9:42 AM 97.0 77 16 140 87      10:40 AM 98.0 74 14 132 73      10:57 AM 98.3 82 14 156 68        Post Infusion  The patient did tolerate the infusion.     Nursing Notes  Order date: 09/03/2024  Last infusion date: 05/23/2025  Oral premeds per order: N/A  Specialty Pharmacy: N  Change in health status per assessment? N   IV maintenance 0.9% NS 500ml TKO  Start time: 9:48 AM  IV start by: Liyah Chang RN  IV and Fluid D/C time: 11:12 PM  NS volume infused: <50mL  Site condition: CDI and patent throughout infusion, no redness/swelling at IV site  D/C instructions reviewed, Pt verbalized understanding.  Labs with IV start   Liyah Chang RN    Date Medication Total mg Used mg Wasted mg   07/07/2025 Solu-Medrol 40.00 40.00 0.00   07/07/2025 Remicade 1100.00 1046.00 54.00    05/23/2025 Solu-Medrol 40.00 40.00 0.00   05/23/2025 Remicade 1100.00 1046.00 54.00   04/02/2025 Solu-Medrol 40.00 40.00 0.00   04/02/2025 Remicade 1100.00 1056.00 4.40       Visit oversight provided by:  Castro Guzman MD 07/07/2025 10:00 AM

## 2025-07-09 NOTE — PROCEDURES
2025        Marylou Mortensen   [address] [place] [id number]      Marylou Festus,  :  1985    I am writing to let you know the results of the tests that were done the other day.   Thank you for allowing [facility] the opportunity to take part in your healthcare.  At [facility] we strive to provide each patient with the finest gastroenterology care available.  We hope your experience was pleasant and informative.    Your labs are normal.  Hepatic Function Panel (7) 2025 10:05   Description Result Units Flags Range   Bilirubin, Total 0.3 mg/dL  0.0-1.2   Bilirubin, Direct 0.09 mg/dL  0.00-0.40   AST (SGOT) 32 IU/L  0-40   ALT (SGPT) 30 IU/L  0-32   Albumin 4.4 g/dL  3.9-4.9   Alkaline Phosphatase 95 IU/L     Protein, Total 7.7 g/dL  6.0-8.5   Comments   Performed At: [facility]  [address], [place], [id number]  Malina Steve MD, Phone: [phone]   CBC With Differential/Platelet 2025 10:05   Description Result Units Flags Range   Hemoglobin 13.7 g/dL  11.1-15.9   Hematocrit 42.2 %  34.0-46.6   MCV 94 fL  79-97   MCHC 32.5 g/dL  31.5-35.7   MCH 30.6 pg  26.6-33.0   RDW 13.7 %  11.7-15.4   Platelets 354 x10E3/uL  150-450   Neutrophils 54 %  Not Estab.   Lymphs 34 %  Not Estab.   Monocytes 8 %  Not Estab.   Eos 2 %  Not Estab.   Basos 1 %  Not Estab.   Neutrophils (Absolute) 5.2 x10E3/uL  1.4-7.0   Lymphs (Absolute) 3.3 x10E3/uL H 0.7-3.1   Monocytes(Absolute) 0.8 x10E3/uL  0.1-0.9   Eos (Absolute) 0.2 x10E3/uL  0.0-0.4   Baso (Absolute) 0.1 x10E3/uL  0.0-0.2   Immature Grans (Abs) 0.1 x10E3/uL  0.0-0.1   Immature Granulocytes 1 %  Not Estab.   RBC 4.47 x10E6/uL  3.77-5.28   WBC 9.6 x10E3/uL  3.4-10.8   Comments   First Available              Performed At: [facility]  [address] [facility], [place], [place], [id number]  Malina Steve MD, Phone: [phone]         I hope you are feeling well.  Please call sooner if you have a problem, otherwise I'll  see you as we had previously scheduled.        Thank you.    Electronically signed by:  Valentine Mann NP 07/08/2025 10:47 AM  Document generated by:  Valentine Mann NP  07/08/2025  If your provider ordered multiple tests; the results may not become available at the same time.  If multiple test results are received within 14 days of one another, you may receive a duplicate.  cc:  Soila Dial MD

## 2025-07-12 DIAGNOSIS — I10 ESSENTIAL HYPERTENSION: ICD-10-CM

## 2025-07-14 RX ORDER — LISINOPRIL 2.5 MG/1
2.5 TABLET ORAL DAILY
Qty: 90 TABLET | Refills: 2 | Status: SHIPPED | OUTPATIENT
Start: 2025-07-14

## 2025-07-16 ENCOUNTER — OFFICE VISIT (OUTPATIENT)
Dept: FAMILY MEDICINE | Facility: CLINIC | Age: 40
End: 2025-07-16
Attending: INTERNAL MEDICINE
Payer: COMMERCIAL

## 2025-07-16 VITALS
HEIGHT: 64 IN | DIASTOLIC BLOOD PRESSURE: 88 MMHG | HEART RATE: 77 BPM | TEMPERATURE: 96.9 F | SYSTOLIC BLOOD PRESSURE: 132 MMHG | BODY MASS INDEX: 40.12 KG/M2 | RESPIRATION RATE: 16 BRPM | OXYGEN SATURATION: 96 % | WEIGHT: 235 LBS

## 2025-07-16 DIAGNOSIS — R00.0 TACHYCARDIA: ICD-10-CM

## 2025-07-16 DIAGNOSIS — E78.5 DYSLIPIDEMIA: ICD-10-CM

## 2025-07-16 DIAGNOSIS — E66.813 CLASS 3 OBESITY WITH ALVEOLAR HYPOVENTILATION, SERIOUS COMORBIDITY, AND BODY MASS INDEX (BMI) OF 40.0 TO 44.9 IN ADULT (H): ICD-10-CM

## 2025-07-16 DIAGNOSIS — I10 ESSENTIAL HYPERTENSION: ICD-10-CM

## 2025-07-16 DIAGNOSIS — L30.9 ECZEMA, UNSPECIFIED TYPE: ICD-10-CM

## 2025-07-16 DIAGNOSIS — Z00.00 ROUTINE GENERAL MEDICAL EXAMINATION AT A HEALTH CARE FACILITY: ICD-10-CM

## 2025-07-16 DIAGNOSIS — E66.2 CLASS 3 OBESITY WITH ALVEOLAR HYPOVENTILATION, SERIOUS COMORBIDITY, AND BODY MASS INDEX (BMI) OF 40.0 TO 44.9 IN ADULT (H): ICD-10-CM

## 2025-07-16 DIAGNOSIS — Z12.4 CERVICAL CANCER SCREENING: ICD-10-CM

## 2025-07-16 DIAGNOSIS — F90.2 ADHD (ATTENTION DEFICIT HYPERACTIVITY DISORDER), COMBINED TYPE: Primary | ICD-10-CM

## 2025-07-16 DIAGNOSIS — K50.80 CROHN'S DISEASE OF BOTH SMALL AND LARGE INTESTINE WITHOUT COMPLICATION (H): ICD-10-CM

## 2025-07-16 LAB
CHOLEST SERPL-MCNC: 165 MG/DL
FASTING STATUS PATIENT QL REPORTED: ABNORMAL
HDLC SERPL-MCNC: 45 MG/DL
LDLC SERPL CALC-MCNC: 93 MG/DL
NONHDLC SERPL-MCNC: 120 MG/DL
TRIGL SERPL-MCNC: 133 MG/DL

## 2025-07-16 PROCEDURE — 1126F AMNT PAIN NOTED NONE PRSNT: CPT | Performed by: INTERNAL MEDICINE

## 2025-07-16 PROCEDURE — 3079F DIAST BP 80-89 MM HG: CPT | Performed by: INTERNAL MEDICINE

## 2025-07-16 PROCEDURE — 3075F SYST BP GE 130 - 139MM HG: CPT | Performed by: INTERNAL MEDICINE

## 2025-07-16 PROCEDURE — 99214 OFFICE O/P EST MOD 30 MIN: CPT | Performed by: INTERNAL MEDICINE

## 2025-07-16 PROCEDURE — 80061 LIPID PANEL: CPT | Performed by: INTERNAL MEDICINE

## 2025-07-16 PROCEDURE — 36415 COLL VENOUS BLD VENIPUNCTURE: CPT | Performed by: INTERNAL MEDICINE

## 2025-07-16 RX ORDER — DEXTROAMPHETAMINE SACCHARATE, AMPHETAMINE ASPARTATE MONOHYDRATE, DEXTROAMPHETAMINE SULFATE AND AMPHETAMINE SULFATE 2.5; 2.5; 2.5; 2.5 MG/1; MG/1; MG/1; MG/1
10 CAPSULE, EXTENDED RELEASE ORAL DAILY
Qty: 30 CAPSULE | Refills: 0 | Status: SHIPPED | OUTPATIENT
Start: 2025-08-15 | End: 2025-09-14

## 2025-07-16 RX ORDER — CLOBETASOL PROPIONATE 0.5 MG/G
CREAM TOPICAL 2 TIMES DAILY
Qty: 60 G | Refills: 1 | Status: SHIPPED | OUTPATIENT
Start: 2025-07-16

## 2025-07-16 RX ORDER — DEXTROAMPHETAMINE SACCHARATE, AMPHETAMINE ASPARTATE MONOHYDRATE, DEXTROAMPHETAMINE SULFATE AND AMPHETAMINE SULFATE 2.5; 2.5; 2.5; 2.5 MG/1; MG/1; MG/1; MG/1
10 CAPSULE, EXTENDED RELEASE ORAL DAILY
Qty: 30 CAPSULE | Refills: 0 | Status: SHIPPED | OUTPATIENT
Start: 2025-09-14 | End: 2025-10-14

## 2025-07-16 RX ORDER — DEXTROAMPHETAMINE SACCHARATE, AMPHETAMINE ASPARTATE MONOHYDRATE, DEXTROAMPHETAMINE SULFATE AND AMPHETAMINE SULFATE 2.5; 2.5; 2.5; 2.5 MG/1; MG/1; MG/1; MG/1
10 CAPSULE, EXTENDED RELEASE ORAL DAILY
Qty: 30 CAPSULE | Refills: 0 | Status: SHIPPED | OUTPATIENT
Start: 2025-07-16 | End: 2025-08-15

## 2025-07-16 ASSESSMENT — PAIN SCALES - GENERAL: PAINLEVEL_OUTOF10: NO PAIN (0)

## 2025-07-16 NOTE — PATIENT INSTRUCTIONS
As discussed , will start off on Adderall low dose XR     Started on Wegovy , please confirm if no coverage of insurance and alternatives covered by them    Placed lab order for Lipid panel     Placed referral to dermatology as well as started on Clobetasol.     =================================

## 2025-07-16 NOTE — PROGRESS NOTES
Assessment and Plan  1. ADHD (attention deficit hyperactivity disorder), combined type (Primary)  Chronic problem, uncontrolled.  Patient has been diagnosed with ADD but tried and failed all the nonstimulant medications till date.  Patient opting to start off on stimulants but given the uncontrolled blood pressure and tachycardia they have been treating these as mentioned below before starting on Adderall.  -Discussed on various forms of Adderall and patient's current work style, shared decision to start off on low-dose extended release, AVS given with follow-up instructions as mentioned below.  Patient understands the plan  - amphetamine-dextroamphetamine (ADDERALL XR) 10 MG 24 hr capsule; Take 1 capsule (10 mg) by mouth daily.  Dispense: 30 capsule; Refill: 0  - amphetamine-dextroamphetamine (ADDERALL XR) 10 MG 24 hr capsule; Take 1 capsule (10 mg) by mouth daily.  Dispense: 30 capsule; Refill: 0  - amphetamine-dextroamphetamine (ADDERALL XR) 10 MG 24 hr capsule; Take 1 capsule (10 mg) by mouth daily.  Dispense: 30 capsule; Refill: 0    2. Class 3 obesity with alveolar hypoventilation, serious comorbidity, and body mass index (BMI) of 40.0 to 44.9 in adult (H)  Chronic problem, uncontrolled with weight gain happening, and patient having exertional dyspnea/SOB on and off due to overweight and exertion.    - Discussed on weight loss options which patient opting for GLP-1 prescription.  Given the BMI of 40 and risk factors mentioned in the assessment plan today will consider Wegovy prescription.  Patient made aware of insurance noncoverage issues and she will inform me on covered alternatives by checking with her insurance if this does not go.  Patient understands the plan.  - semaglutide-weight management (WEGOVY) 0.25 MG/0.5ML pen; Inject 0.5 mLs (0.25 mg) subcutaneously once a week.  Dispense: 2 mL; Refill: 2    3. Crohn's disease of both small and large intestine without complication (H)  Chronic stable, continue  current Remicade infusions as well as sulfasalazine managed by gastroenterology.  Risk factors per above morbid obesity, will be treating it accordingly.    4. Dyslipidemia  Recent starting of simvastatin 10 mg nightly, will recheck and do further titration of the dosage if needed.  Patient understood with plan.  - Lipid panel reflex to direct LDL Fasting; Future  - Lipid panel reflex to direct LDL Fasting    5. Eczema, unspecified type  Ongoing problem, uncontrolled.  Given her ongoing skin lesions as per the physical exam today, patient has been waiting for dermatology, physical exam positive for eczematous thick blotches seen on the chin, trunk, upper extremity.  Will consider starting empiric clobetasol as well as placed referral to dermatology.  For further recommendations.  Patient understood the plan.  - Adult Dermatology  Referral; Future  - clobetasol propionate (TEMOVATE) 0.05 % external cream; Apply topically 2 times daily. For 2 weeks  Dispense: 60 g; Refill: 1    6. Routine general medical examination at a health care facility  - PRIMARY CARE FOLLOW-UP SCHEDULING    7. Cervical cancer screening  Patient not opting on the Pap smear offered to her in the office today, she will let me know when she decides.  All risks understood.    8. Essential hypertension  9. Tachycardia  Chronic problems, improving.  Patient has been started on lisinopril and propranolol in the last visit as mentioned in the HPI below, currently blood pressure in heart rate remaining well-controlled for which shared decision of starting of Adderall as mentioned above which we have been awaiting for her to get this well-controlled before treating her ADD as she tried and failed nonstimulant medications for ADD.      The longitudinal plan of care for the diagnosis(es)/condition(s) as documented were addressed during this visit. Due to the added complexity in care, I will continue to support Marylou in the subsequent management and  with ongoing continuity of care.      Please note that this note consists of symbols derived from keyboarding, dictation and/or voice recognition software. As a result, there may be errors in the script that have gone undetected. Please consider this when interpreting information found in this chart.    Patient Instructions   As discussed , will start off on Adderall low dose XR     Started on Wegovy , please confirm if no coverage of insurance and alternatives covered by them    Placed lab order for Lipid panel     Placed referral to dermatology as well as started on Clobetasol.     =================================      Return in about 3 months (around 10/16/2025), or if symptoms worsen or fail to improve, for ADD, E-Visit.    Soila Dial MD  [facility] SUNITA Hickman is a 40 year old, presenting for the following health issues:  Recheck Medication        7/16/2025    10:32 AM   Additional Questions   Roomed by Rene     History of Present Illness       Hyperlipidemia:  She presents for follow up of hyperlipidemia.   She is taking medication to lower cholesterol. She is not having myalgia or other side effects to statin medications.    Hypertension: She presents for follow up of hypertension.  She does not check blood pressure  regularly outside of the clinic. Outpatient blood pressures have not been over 140/90. She follows a low salt diet.     Reason for visit:  Follow up visit    She eats 2-3 servings of fruits and vegetables daily.She consumes 1 sweetened beverage(s) daily.She exercises with enough effort to increase her heart rate 10 to 19 minutes per day.  She exercises with enough effort to increase her heart rate 3 or less days per week.   She is taking medications regularly.          BP Readings from Last 2 Encounters:   07/16/25 132/88   06/12/25 138/88     Medication Followup of all  Taking Medication as prescribed: yes  Side Effects:  None  Medication  "Helping Symptoms:  yes      Review of Systems  Constitutional, HEENT, cardiovascular, pulmonary, GI, , musculoskeletal, neuro, skin, endocrine and psych systems are negative, except as otherwise noted.      Objective    /88   Pulse 77   Temp 96.9  F (36.1  C) (Temporal)   Resp 16   Ht 1.626 m (5' 4\")   Wt 106.6 kg (235 lb)   LMP 07/02/2025 (Approximate)   SpO2 96%   BMI 40.34 kg/m    Body mass index is 40.34 kg/m .  Physical Exam   GENERAL: alert and no distress  NECK: no adenopathy, no asymmetry, masses, or scars  RESP: lungs clear to auscultation - no rales, rhonchi or wheezes  CV: regular rate and rhythm, normal S1 S2, no S3 or S4, no murmur, click or rub, no peripheral edema  ABDOMEN: soft, nontender, no hepatosplenomegaly, no masses and bowel sounds normal  MS: no gross musculoskeletal defects noted, no edema  SKIN : POSITIVE for eczematous thick blotches seen on the chin, trunk, upper extremity.      Signed Electronically by: Soila Dial MD    "

## 2025-07-17 ENCOUNTER — PATIENT OUTREACH (OUTPATIENT)
Dept: CARE COORDINATION | Facility: CLINIC | Age: 40
End: 2025-07-17
Payer: COMMERCIAL

## 2025-07-21 ENCOUNTER — PATIENT OUTREACH (OUTPATIENT)
Dept: CARE COORDINATION | Facility: CLINIC | Age: 40
End: 2025-07-21
Payer: COMMERCIAL

## 2025-07-21 ENCOUNTER — TELEPHONE (OUTPATIENT)
Dept: FAMILY MEDICINE | Facility: CLINIC | Age: 40
End: 2025-07-21
Payer: COMMERCIAL

## 2025-07-21 NOTE — TELEPHONE ENCOUNTER
Prior Authorization Retail Medication Request    Medication/Dose: semaglutide-weight management (WEGOVY) 0.25 MG/0.5ML pen  Diagnosis and ICD code (if different than what is on RX):    New/renewal/insurance change PA/secondary ins. PA:  Previously Tried and Failed:    Rationale:      Insurance   Primary:   Insurance ID:  [id number]    Secondary (if applicable):  Insurance ID:      Pharmacy Information (if different than what is on RX)  Name:    Phone:  [phone]  Fax:    Clinic Information  Preferred routing pool for dept communication:

## 2025-07-24 NOTE — TELEPHONE ENCOUNTER
Central Prior Authorization Team  Phone: [phone]    PA Initiation    Medication: WEGOVY 0.25 MG/0.5ML SC SOAJ  Insurance Company: [employer] - Phone [phone] Fax [phone]  Pharmacy Filling the Rx: [id number] - [place], [place] - [address] [facility] [address]  Filling Pharmacy Phone: [phone]  Filling Pharmacy Fax: [phone]  Start Date: 7/24/2025

## 2025-07-28 NOTE — TELEPHONE ENCOUNTER
PRIOR AUTHORIZATION DENIED    Medication: WEGOVY 0.25 MG/0.5ML SC SOAJ  Insurance Company: [employer] - Phone [phone] Fax [phone]  Denial Date: 7/25/2025  Denial Reason(s): Weight-loss meds are not covered by insurance. PLAN EXCLUSION     Appeal Information: N/A - PLAN EXCLUSION medications are not applicable for appeal options  Patient Notified: NO

## 2025-07-30 NOTE — TELEPHONE ENCOUNTER
Please let patient know that her insurance is not able to cover her Wegovy which we already discussed at her office visit .     Request to check with her insurance plan on covered medications and update us so I can prescribe accordingly or check with her pharmacy on cost issues.      Thank you  Soila Dial MD on 7/29/2025

## 2025-07-31 ENCOUNTER — MYC MEDICAL ADVICE (OUTPATIENT)
Dept: FAMILY MEDICINE | Facility: CLINIC | Age: 40
End: 2025-07-31
Payer: COMMERCIAL

## 2025-08-18 ENCOUNTER — MYC MEDICAL ADVICE (OUTPATIENT)
Dept: FAMILY MEDICINE | Facility: CLINIC | Age: 40
End: 2025-08-18
Payer: COMMERCIAL

## 2025-08-18 DIAGNOSIS — F90.2 ADHD (ATTENTION DEFICIT HYPERACTIVITY DISORDER), COMBINED TYPE: Primary | ICD-10-CM

## 2025-08-20 RX ORDER — DEXTROAMPHETAMINE SACCHARATE, AMPHETAMINE ASPARTATE MONOHYDRATE, DEXTROAMPHETAMINE SULFATE AND AMPHETAMINE SULFATE 3.75; 3.75; 3.75; 3.75 MG/1; MG/1; MG/1; MG/1
15 CAPSULE, EXTENDED RELEASE ORAL DAILY
Qty: 14 CAPSULE | Refills: 0 | Status: SHIPPED | OUTPATIENT
Start: 2025-08-20

## 2025-08-21 ENCOUNTER — TRANSFERRED RECORDS (OUTPATIENT)
Dept: ADMINISTRATIVE | Facility: CLINIC | Age: 40
End: 2025-08-21
Payer: COMMERCIAL

## (undated) DEVICE — LINEN GOWN X4 5410

## (undated) DEVICE — SU VICRYL 2-0 CT-1 27" UND J259H

## (undated) DEVICE — SU ETHILON 3-0 PS-1 18" 1663H

## (undated) DEVICE — SOL NACL 0.9% IRRIG 1000ML BOTTLE 2F7124

## (undated) DEVICE — GLOVE PROTEXIS BLUE W/NEU-THERA 8.0  2D73EB80

## (undated) DEVICE — DRAPE CONVERTORS U-DRAPE 60X72" 8476

## (undated) DEVICE — SUCTION MANIFOLD NEPTUNE 2 SYS 1 PORT 702-025-000

## (undated) DEVICE — SU SILK 2-0 SH CR 5X18" C0125

## (undated) DEVICE — CLIP HORIZON MED BLUE 002200

## (undated) DEVICE — GLOVE BIOGEL PI MICRO INDICATOR UNDERGLOVE SZ 8.0 48980

## (undated) DEVICE — CLIP HORIZON SM YELLOW 001200

## (undated) DEVICE — ESU GROUND PAD UNIVERSAL W/O CORD

## (undated) DEVICE — DECANTER TRANSFER DEVICE 2008S

## (undated) DEVICE — SUCTION TIP YANKAUER W/O VENT K86

## (undated) DEVICE — GLOVE BIOGEL PI ULTRATOUCH G SZ 7.5 42175

## (undated) DEVICE — LINEN ORTHO PACK 5446

## (undated) DEVICE — DRSG AQUACEL AG HYDROFIBER  3.5X10" 422605

## (undated) DEVICE — PACK UNIVERSAL SPLIT 29131

## (undated) DEVICE — SU SILK 2-0 PS 18" 1588H

## (undated) DEVICE — SU PDS II 3-0 PS-2 18" Z497G

## (undated) DEVICE — DRSG TELFA 3X8" 1238

## (undated) DEVICE — GLOVE PROTEXIS POWDER FREE SMT 7.0  2D72PT70X

## (undated) DEVICE — SYR 10ML FINGER CONTROL W/O NDL 309695

## (undated) DEVICE — BNDG ELASTIC 6"X5YDS STERILE 6611-6S

## (undated) DEVICE — SOL WATER IRRIG 1000ML BOTTLE 2F7114

## (undated) DEVICE — GLOVE PROTEXIS W/NEU-THERA 7.5  2D73TE75

## (undated) DEVICE — DRAPE U-DRAPE 1015NSD NON-STERILE

## (undated) DEVICE — SU VICRYL 0 CT-1 27" J340H

## (undated) DEVICE — LINEN TOWEL PACK X5 5464

## (undated) DEVICE — DRAPE MAYO STAND 23X54 8337

## (undated) DEVICE — BLADE CLIPPER SGL USE 9680

## (undated) DEVICE — DRAPE STOCKINETTE IMPERVIOUS 12" 1587

## (undated) DEVICE — GLOVE PROTEXIS BLUE W/NEU-THERA 7.5  2D73EB75

## (undated) DEVICE — DRSG AQUACEL AG 3.5X6.0" HYDROFIBER 412010

## (undated) DEVICE — ESU GROUND PAD ADULT W/CORD E7507

## (undated) DEVICE — PREP CHLORAPREP 26ML TINTED HI-LITE ORANGE 930815

## (undated) DEVICE — STRAP KNEE/BODY 31143004

## (undated) DEVICE — SU VICRYL 0 CT-1 36" J346H

## (undated) DEVICE — SU PDS II 3-0 PS-1 18" Z683G

## (undated) DEVICE — DRSG AQUACEL AG 3.5X9.75" HYDROFIBER 412011

## (undated) DEVICE — PACK TOTAL HIP W/U DRAPE RIVERSIDE LATEX FREE

## (undated) DEVICE — SU SILK 2-0 TIE 12X30" A305H

## (undated) DEVICE — SPECIMEN CONTAINER 5OZ STERILE 2600SA

## (undated) DEVICE — SUCTION MANIFOLD NEPTUNE 2 SYS 4 PORT 0702-020-000

## (undated) DEVICE — ESU LIGASURE IMPACT OPEN SEALER/DVDR CVD LG JAW LF4418

## (undated) DEVICE — CLIP HORIZON LG ORANGE 004200

## (undated) DEVICE — SU MONOCRYL 4-0 PS-2 18" UND Y496G

## (undated) DEVICE — NEEDLE HYPO MONOJECT 22GA 1.15IN BLUE 8881250206

## (undated) DEVICE — CONTAINER SPECIMEN 4OZ STERILE 17099

## (undated) RX ORDER — DEXAMETHASONE SODIUM PHOSPHATE 4 MG/ML
INJECTION, SOLUTION INTRA-ARTICULAR; INTRALESIONAL; INTRAMUSCULAR; INTRAVENOUS; SOFT TISSUE
Status: DISPENSED
Start: 2024-05-13

## (undated) RX ORDER — LIDOCAINE HYDROCHLORIDE 20 MG/ML
INJECTION, SOLUTION EPIDURAL; INFILTRATION; INTRACAUDAL; PERINEURAL
Status: DISPENSED
Start: 2018-07-26

## (undated) RX ORDER — PROPOFOL 10 MG/ML
INJECTION, EMULSION INTRAVENOUS
Status: DISPENSED
Start: 2024-05-13

## (undated) RX ORDER — PROPOFOL 10 MG/ML
INJECTION, EMULSION INTRAVENOUS
Status: DISPENSED
Start: 2018-07-26

## (undated) RX ORDER — PROPOFOL 10 MG/ML
INJECTION, EMULSION INTRAVENOUS
Status: DISPENSED
Start: 2018-05-31

## (undated) RX ORDER — KETOROLAC TROMETHAMINE 30 MG/ML
INJECTION, SOLUTION INTRAMUSCULAR; INTRAVENOUS
Status: DISPENSED
Start: 2018-05-31

## (undated) RX ORDER — ACETAMINOPHEN 325 MG/1
TABLET ORAL
Status: DISPENSED
Start: 2024-05-13

## (undated) RX ORDER — HYDROMORPHONE HYDROCHLORIDE 1 MG/ML
INJECTION, SOLUTION INTRAMUSCULAR; INTRAVENOUS; SUBCUTANEOUS
Status: DISPENSED
Start: 2024-05-13

## (undated) RX ORDER — ONDANSETRON 2 MG/ML
INJECTION INTRAMUSCULAR; INTRAVENOUS
Status: DISPENSED
Start: 2018-05-31

## (undated) RX ORDER — SCOLOPAMINE TRANSDERMAL SYSTEM 1 MG/1
PATCH, EXTENDED RELEASE TRANSDERMAL
Status: DISPENSED
Start: 2024-05-13

## (undated) RX ORDER — FENTANYL CITRATE-0.9 % NACL/PF 10 MCG/ML
PLASTIC BAG, INJECTION (ML) INTRAVENOUS
Status: DISPENSED
Start: 2024-05-13

## (undated) RX ORDER — FENTANYL CITRATE 50 UG/ML
INJECTION, SOLUTION INTRAMUSCULAR; INTRAVENOUS
Status: DISPENSED
Start: 2024-05-13

## (undated) RX ORDER — LIDOCAINE HYDROCHLORIDE 10 MG/ML
INJECTION, SOLUTION EPIDURAL; INFILTRATION; INTRACAUDAL; PERINEURAL
Status: DISPENSED
Start: 2018-05-22

## (undated) RX ORDER — LIDOCAINE HYDROCHLORIDE 20 MG/ML
INJECTION, SOLUTION EPIDURAL; INFILTRATION; INTRACAUDAL; PERINEURAL
Status: DISPENSED
Start: 2018-05-31

## (undated) RX ORDER — GABAPENTIN 300 MG/1
CAPSULE ORAL
Status: DISPENSED
Start: 2024-05-13

## (undated) RX ORDER — ACETAMINOPHEN 325 MG/1
TABLET ORAL
Status: DISPENSED
Start: 2018-05-31

## (undated) RX ORDER — CEFAZOLIN SODIUM/WATER 2 G/20 ML
SYRINGE (ML) INTRAVENOUS
Status: DISPENSED
Start: 2024-05-13

## (undated) RX ORDER — KETOROLAC TROMETHAMINE 30 MG/ML
INJECTION, SOLUTION INTRAMUSCULAR; INTRAVENOUS
Status: DISPENSED
Start: 2018-07-26

## (undated) RX ORDER — GLYCOPYRROLATE 0.2 MG/ML
INJECTION INTRAMUSCULAR; INTRAVENOUS
Status: DISPENSED
Start: 2018-07-26

## (undated) RX ORDER — GABAPENTIN 300 MG/1
CAPSULE ORAL
Status: DISPENSED
Start: 2018-07-26

## (undated) RX ORDER — DEXAMETHASONE SODIUM PHOSPHATE 4 MG/ML
INJECTION, SOLUTION INTRA-ARTICULAR; INTRALESIONAL; INTRAMUSCULAR; INTRAVENOUS; SOFT TISSUE
Status: DISPENSED
Start: 2018-07-26

## (undated) RX ORDER — ONDANSETRON 2 MG/ML
INJECTION INTRAMUSCULAR; INTRAVENOUS
Status: DISPENSED
Start: 2018-07-26

## (undated) RX ORDER — GLYCOPYRROLATE 0.2 MG/ML
INJECTION INTRAMUSCULAR; INTRAVENOUS
Status: DISPENSED
Start: 2018-05-31

## (undated) RX ORDER — ACETAMINOPHEN 325 MG/1
TABLET ORAL
Status: DISPENSED
Start: 2018-07-26

## (undated) RX ORDER — GABAPENTIN 300 MG/1
CAPSULE ORAL
Status: DISPENSED
Start: 2018-05-31

## (undated) RX ORDER — FENTANYL CITRATE 50 UG/ML
INJECTION, SOLUTION INTRAMUSCULAR; INTRAVENOUS
Status: DISPENSED
Start: 2018-05-31

## (undated) RX ORDER — OXYCODONE HYDROCHLORIDE 5 MG/1
TABLET ORAL
Status: DISPENSED
Start: 2018-07-26

## (undated) RX ORDER — OXYCODONE HYDROCHLORIDE 5 MG/1
TABLET ORAL
Status: DISPENSED
Start: 2018-05-31

## (undated) RX ORDER — FENTANYL CITRATE 50 UG/ML
INJECTION, SOLUTION INTRAMUSCULAR; INTRAVENOUS
Status: DISPENSED
Start: 2018-07-26

## (undated) RX ORDER — OXYCODONE HYDROCHLORIDE 5 MG/1
TABLET ORAL
Status: DISPENSED
Start: 2024-05-13

## (undated) RX ORDER — HYDROMORPHONE HYDROCHLORIDE 1 MG/ML
INJECTION, SOLUTION INTRAMUSCULAR; INTRAVENOUS; SUBCUTANEOUS
Status: DISPENSED
Start: 2018-07-26

## (undated) RX ORDER — DEXAMETHASONE SODIUM PHOSPHATE 4 MG/ML
INJECTION, SOLUTION INTRA-ARTICULAR; INTRALESIONAL; INTRAMUSCULAR; INTRAVENOUS; SOFT TISSUE
Status: DISPENSED
Start: 2018-05-31

## (undated) RX ORDER — EPINEPHRINE 1 MG/ML
INJECTION, SOLUTION, CONCENTRATE INTRAVENOUS
Status: DISPENSED
Start: 2018-05-31

## (undated) RX ORDER — BUPIVACAINE HYDROCHLORIDE 2.5 MG/ML
INJECTION, SOLUTION EPIDURAL; INFILTRATION; INTRACAUDAL
Status: DISPENSED
Start: 2018-07-26

## (undated) RX ORDER — ONDANSETRON 2 MG/ML
INJECTION INTRAMUSCULAR; INTRAVENOUS
Status: DISPENSED
Start: 2024-05-13

## (undated) RX ORDER — BUPIVACAINE HYDROCHLORIDE 2.5 MG/ML
INJECTION, SOLUTION EPIDURAL; INFILTRATION; INTRACAUDAL
Status: DISPENSED
Start: 2018-05-31

## (undated) RX ORDER — EPINEPHRINE 1 MG/ML
INJECTION, SOLUTION, CONCENTRATE INTRAVENOUS
Status: DISPENSED
Start: 2018-07-26